# Patient Record
Sex: FEMALE | Race: WHITE | Employment: UNEMPLOYED | RURAL
[De-identification: names, ages, dates, MRNs, and addresses within clinical notes are randomized per-mention and may not be internally consistent; named-entity substitution may affect disease eponyms.]

---

## 2017-09-15 ENCOUNTER — OFFICE VISIT (OUTPATIENT)
Dept: INTERNAL MEDICINE CLINIC | Age: 52
End: 2017-09-15

## 2017-09-15 VITALS
HEART RATE: 93 BPM | HEIGHT: 66 IN | OXYGEN SATURATION: 97 % | WEIGHT: 160 LBS | SYSTOLIC BLOOD PRESSURE: 130 MMHG | TEMPERATURE: 99.4 F | RESPIRATION RATE: 16 BRPM | BODY MASS INDEX: 25.71 KG/M2 | DIASTOLIC BLOOD PRESSURE: 87 MMHG

## 2017-09-15 DIAGNOSIS — E78.2 MIXED HYPERLIPIDEMIA: ICD-10-CM

## 2017-09-15 DIAGNOSIS — E11.9 DIABETES MELLITUS TYPE 2, DIET-CONTROLLED (HCC): ICD-10-CM

## 2017-09-15 DIAGNOSIS — F41.9 ANXIETY: ICD-10-CM

## 2017-09-15 DIAGNOSIS — Z72.0 TOBACCO ABUSE: ICD-10-CM

## 2017-09-15 DIAGNOSIS — J44.9 CHRONIC OBSTRUCTIVE PULMONARY DISEASE, UNSPECIFIED COPD TYPE (HCC): Primary | ICD-10-CM

## 2017-09-15 LAB — HBA1C MFR BLD HPLC: 7.8 %

## 2017-09-15 RX ORDER — ALBUTEROL SULFATE 90 UG/1
1 AEROSOL, METERED RESPIRATORY (INHALATION)
Qty: 1 INHALER | Refills: 5 | Status: SHIPPED | OUTPATIENT
Start: 2017-09-15 | End: 2019-07-01 | Stop reason: SDUPTHER

## 2017-09-15 RX ORDER — AZITHROMYCIN 250 MG/1
250 TABLET, FILM COATED ORAL SEE ADMIN INSTRUCTIONS
Qty: 6 TAB | Refills: 0 | Status: SHIPPED | OUTPATIENT
Start: 2017-09-15 | End: 2017-10-09 | Stop reason: ALTCHOICE

## 2017-09-15 RX ORDER — BUPROPION HYDROCHLORIDE 100 MG/1
100 TABLET ORAL 3 TIMES DAILY
Qty: 90 TAB | Refills: 1 | Status: SHIPPED | OUTPATIENT
Start: 2017-09-15 | End: 2018-03-14 | Stop reason: ALTCHOICE

## 2017-09-15 RX ORDER — METHADONE HYDROCHLORIDE 10 MG/1
40 TABLET ORAL DAILY
COMMUNITY
End: 2019-04-22 | Stop reason: ALTCHOICE

## 2017-09-15 NOTE — PROGRESS NOTES
Chief Complaint   Patient presents with    Wheezing     prod cough yellow/green sputum, SOB, екатерина, wheezing, nose running, stuffy and headaches     I have reviewed the patient's medical history in detail and updated the computerized patient record. Health Maintenance reviewed. 1. Have you been to the ER, urgent care clinic since your last visit? Hospitalized since your last visit?no    2. Have you seen or consulted any other health care providers outside of the [address] [place] [address] since your last visit? Include any pap smears or colon screening.  No    Encouraged pt to discuss pt's wishes with spouse/partner/family and bring them in the next appt to follow thru with the Advanced Directive

## 2017-09-15 NOTE — MR AVS SNAPSHOT
Visit Information Date & Time Provider Department Dept. Phone Encounter #  
 9/15/2017  2:30 PM Afsaneh Taylor MD [facility] [phone] [id number] Follow-up Instructions Return in about 4 weeks (around 10/13/2017) for routine follow up. Upcoming Health Maintenance Date Due Hepatitis C Screening 1965 MICROALBUMIN Q1 3/4/1975 EYE EXAM RETINAL OR DILATED Q1 3/4/1975 Pneumococcal 19-64 Medium Risk (1 of 1 - PPSV23) 3/4/1984 PAP AKA CERVICAL CYTOLOGY 3/4/1986 FOBT Q 1 YEAR AGE 50-75 3/4/2015 BREAST CANCER SCRN MAMMOGRAM 5/6/2016 HEMOGLOBIN A1C Q6M 1/28/2017 LIPID PANEL Q1 7/28/2017 INFLUENZA AGE 9 TO ADULT 8/1/2017 FOOT EXAM Q1 9/15/2018 DTaP/Tdap/Td series (2 - Td) 7/28/2026 Allergies as of 9/15/2017  Review Complete On: 9/15/2017 By: Afsaneh Taylor MD  
  
 Severity Noted Reaction Type Reactions Codeine  06/25/2015    Hives Current Immunizations  Reviewed on 8/30/2014 Name Date Influenza Vaccine Gerline Yairten) 11/4/2014 Influenza Vaccine PF 2/24/2014 Influenza Vaccine Split 11/5/2012 Tdap 7/28/2016 Not reviewed this visit You Were Diagnosed With   
  
 Codes Comments Chronic obstructive pulmonary disease, unspecified COPD type ([facility]    -  Primary ICD-10-CM: J44.9 ICD-9-CM: 441 Diabetes mellitus type 2, diet-controlled ([facility]     ICD-10-CM: E11.9 ICD-9-CM: 250.00 Tobacco abuse     ICD-10-CM: Z72.0 ICD-9-CM: 305.1 Mixed hyperlipidemia     ICD-10-CM: E78.2 ICD-9-CM: 272.2 Anxiety     ICD-10-CM: F41.9 ICD-9-CM: 300.00 Vitals BP Pulse Temp Resp Height(growth percentile) Weight(growth percentile) (!) 159/95 (BP 1 Location: Left arm, BP Patient Position: Sitting) (!) 108 99.4 °F (37.4 °C) (Oral) 16 5' 6\" (1.676 m) 160 lb (72.6 kg) LMP SpO2 BMI OB Status Smoking Status 06/03/2014 97% 25.82 kg/m2 Hysterectomy Current Every Day Smoker Vitals History BMI and BSA Data Body Mass Index Body Surface Area  
 25.82 kg/m 2 1.84 m 2 Preferred Pharmacy Pharmacy Name Phone [facility] [address], [place] [phone] Your Updated Medication List  
  
   
This list is accurate as of: 9/15/17  3:36 PM.  Always use your most recent med list.  
  
  
  
  
 * albuterol 2.5 mg /3 mL (0.083 %) nebulizer solution Commonly known as:  PROVENTIL VENTOLIN  
3 mL by Nebulization route every four (4) hours as needed for Wheezing. * albuterol 90 mcg/actuation inhaler Commonly known as:  PROVENTIL HFA, VENTOLIN HFA, PROAIR HFA Take 1 Puff by inhalation every four (4) hours as needed for Wheezing. azithromycin 250 mg tablet Commonly known as:  Jax Candis Take 1 Tab by mouth See Admin Instructions. Take two tablets today then one tablet daily for next 4 days  
  
 beclomethasone 80 mcg/actuation Aero Commonly known as:  QVAR Take 1 Puff by inhalation two (2) times a day. buPROPion 100 mg tablet Commonly known as:  [facility] Take 1 Tab by mouth three (3) times daily. Start 1 tablet daily, usually in the evening, every few days increase as tolerated, quit smoking. cyclobenzaprine 10 mg tablet Commonly known as:  FLEXERIL Take 1 Tab by mouth three (3) times daily as needed for Muscle Spasm(s) (or headache). FLUoxetine 20 mg/5 mL (4 mg/mL) solution Commonly known as:  PROzac Take 10 mL by mouth two (2) times a day.  
  
 gabapentin 400 mg capsule Commonly known as:  NEURONTIN Take 1 Cap by mouth three (3) times daily. Indications: NEUROPATHIC PAIN  
  
 meloxicam 7.5 mg tablet Commonly known as:  MOBIC Take 1 Tab by mouth daily. Indications: OSTEOARTHRITIS  
  
 methadone 10 mg tablet Commonly known as:  DOLOPHINE Take 40 mg by mouth daily. traZODone 100 mg tablet Commonly known as:  Peyton  Take 1 Tab by mouth nightly. * Notice: This list has 2 medication(s) that are the same as other medications prescribed for you. Read the directions carefully, and ask your doctor or other care provider to review them with you. Prescriptions Printed Refills  
 beclomethasone (QVAR) 80 mcg/actuation aero 5 Sig: Take 1 Puff by inhalation two (2) times a day. Class: Print Route: Inhalation  
 albuterol (PROVENTIL HFA, VENTOLIN HFA, PROAIR HFA) 90 mcg/actuation inhaler 5 Sig: Take 1 Puff by inhalation every four (4) hours as needed for Wheezing. Class: Print Route: Inhalation  
 azithromycin (ZITHROMAX) 250 mg tablet 0 Sig: Take 1 Tab by mouth See Admin Instructions. Take two tablets today then one tablet daily for next 4 days Class: Print Route: Oral  
 buPROPion (WELLBUTRIN) 100 mg tablet 1 Sig: Take 1 Tab by mouth three (3) times daily. Start 1 tablet daily, usually in the evening, every few days increase as tolerated, quit smoking. Class: Print Route: Oral  
  
We Performed the Following AMB POC HEMOGLOBIN A1C [85750 CPT(R)] AMB SUPPLY ORDER [[id number] Custom] Comments: Anxiety tobacco abuse, cogntive behavioral therapy Follow-up Instructions Return in about 4 weeks (around 10/13/2017) for routine follow up. Patient Instructions Deciding About Using Medicines To Quit Smoking What are the medicines you can use? Your doctor may prescribe varenicline (Chantix) or bupropion (Zyban). These medicines can help you cope with cravings for tobacco. They are pills that don't contain nicotine. You also can use nicotine replacement products. These do contain nicotine. There are many types. · Gum and lozenges slowly release nicotine into your mouth. · Patches stick to your skin. They slowly release nicotine into your bloodstream. 
· An inhaler has a connelly that contains nicotine. You breathe in a puff of nicotine vapor through your mouth and throat. · Nasal spray releases a mist that contains nicotine. What are key points about this decision? · Using medicines can double your chances of quitting smoking. They can ease cravings and withdrawal symptoms. · Getting counseling along with using medicine can raise your chances of quitting even more. · If you smoke fewer than 5 cigarettes a day, you may not need medicines to help you quit smoking. · These medicines have less nicotine than cigarettes. And by itself, nicotine is not nearly as harmful as smoking. The tars, carbon monoxide, and other toxic chemicals in tobacco cause the harmful effects. · The side effects of nicotine replacement products depend on the type of product. For example, a patch can make your skin red and itchy. Medicines in pill form can make you sick to your stomach. They can also cause dry mouth and trouble sleeping. For most people, the side effects are not bad enough to make them stop using the products. · FDA warning. The U.S. Food and Drug Administration (FDA) warns that people who are taking bupropion or varenicline and who have any serious or unusual changes in mood or behavior or who feel like hurting themselves or someone else should stop taking the medicine and call a doctor right away. If you already have a mood or behavior problem, be sure to tell your doctor before you decide to use these medicines. Why might you choose to use medicines to quit smoking? · You have tried on your own to stop smoking, but you were not able to stop. · You smoke more than 5 cigarettes a day. · You want to increase your chances of quitting smoking. · You want to reduce your cravings and withdrawal symptoms. · You feel the benefits of medicine outweigh the side effects. Why might you choose not to use medicine? · You want to try quitting on your own by stopping all at once (\"cold turkey\"). · You want to cut back slowly on the number of cigarettes you smoke. · You smoke fewer than 5 cigarettes a day. · You do not like using medicine. · You feel the side effects of medicines outweigh the benefits. · You are worried about the cost of medicines. Your decision Thinking about the facts and your feelings can help you make a decision that is right for you. Be sure you understand the benefits and risks of your options, and think about what else you need to do before you make the decision. Where can you learn more? Go to http://sea-clark.info/. Enter V481 in the search box to learn more about \"Deciding About Using Medicines To Quit Smoking. \" Current as of: March 20, 2017 Content Version: 11.3 © 4935-5074 [employer]. Care instructions adapted under license by [employer] (which disclaims liability or warranty for this information). If you have questions about a medical condition or this instruction, always ask your healthcare professional. [address] any warranty or liability for your use of this information. Introducing [facility] & HEALTH SERVICES! Radha Negro introduces [employer] patient portal. Now you can access parts of your medical record, email your doctor's office, and request medication refills online. 1. In your internet browser, go to [email or web address] 2. Click on the First Time User? Click Here link in the Sign In box. You will see the New Member Sign Up page. 3. Enter your [employer] Access Code exactly as it appears below. You will not need to use this code after youve completed the sign-up process. If you do not sign up before the expiration date, you must request a new code. · [employer] Access Code: [email or web address] Expires: 12/14/2017  2:32 PM 
 
4. Enter the last four digits of your Social Security Number (xxxx) and Date of Birth (mm/dd/yyyy) as indicated and click Submit. You will be taken to the next sign-up page. 5. Create a [employer] ID. This will be your [employer] login ID and cannot be changed, so think of one that is secure and easy to remember. 6. Create a [employer] password. You can change your password at any time. 7. Enter your Password Reset Question and Answer. This can be used at a later time if you forget your password. 8. Enter your e-mail address. You will receive e-mail notification when new information is available in [address] 9. Click Sign Up. You can now view and download portions of your medical record. 10. Click the Download Summary menu link to download a portable copy of your medical information. If you have questions, please visit the Frequently Asked Questions section of the [employer] website. Remember, [employer] is NOT to be used for urgent needs. For medical emergencies, dial 911. Now available from your iPhone and Android! Please provide this summary of care documentation to your next provider. Your primary care clinician is listed as [address] If you have any questions after today's visit, please call [phone].
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
## 2017-09-15 NOTE — PATIENT INSTRUCTIONS
Deciding About Using Medicines To Quit Smoking  What are the medicines you can use? Your doctor may prescribe varenicline (Chantix) or bupropion (Zyban). These medicines can help you cope with cravings for tobacco. They are pills that don't contain nicotine. You also can use nicotine replacement products. These do contain nicotine. There are many types. · Gum and lozenges slowly release nicotine into your mouth. · Patches stick to your skin. They slowly release nicotine into your bloodstream.  · An inhaler has a connelly that contains nicotine. You breathe in a puff of nicotine vapor through your mouth and throat. · Nasal spray releases a mist that contains nicotine. What are key points about this decision? · Using medicines can double your chances of quitting smoking. They can ease cravings and withdrawal symptoms. · Getting counseling along with using medicine can raise your chances of quitting even more. · If you smoke fewer than 5 cigarettes a day, you may not need medicines to help you quit smoking. · These medicines have less nicotine than cigarettes. And by itself, nicotine is not nearly as harmful as smoking. The tars, carbon monoxide, and other toxic chemicals in tobacco cause the harmful effects. · The side effects of nicotine replacement products depend on the type of product. For example, a patch can make your skin red and itchy. Medicines in pill form can make you sick to your stomach. They can also cause dry mouth and trouble sleeping. For most people, the side effects are not bad enough to make them stop using the products. · FDA warning. The U.S. Food and Drug Administration (FDA) warns that people who are taking bupropion or varenicline and who have any serious or unusual changes in mood or behavior or who feel like hurting themselves or someone else should stop taking the medicine and call a doctor right away.  If you already have a mood or behavior problem, be sure to tell your doctor before you decide to use these medicines. Why might you choose to use medicines to quit smoking? · You have tried on your own to stop smoking, but you were not able to stop. · You smoke more than 5 cigarettes a day. · You want to increase your chances of quitting smoking. · You want to reduce your cravings and withdrawal symptoms. · You feel the benefits of medicine outweigh the side effects. Why might you choose not to use medicine? · You want to try quitting on your own by stopping all at once (\"cold turkey\"). · You want to cut back slowly on the number of cigarettes you smoke. · You smoke fewer than 5 cigarettes a day. · You do not like using medicine. · You feel the side effects of medicines outweigh the benefits. · You are worried about the cost of medicines. Your decision  Thinking about the facts and your feelings can help you make a decision that is right for you. Be sure you understand the benefits and risks of your options, and think about what else you need to do before you make the decision. Where can you learn more? Go to http://sea-clark.info/. Enter X069 in the search box to learn more about \"Deciding About Using Medicines To Quit Smoking. \"  Current as of: March 20, 2017  Content Version: 11.3  © 0579-6100 [employer]. Care instructions adapted under license by [employer] (which disclaims liability or warranty for this information). If you have questions about a medical condition or this instruction, always ask your healthcare professional. [place] [address] any warranty or liability for your use of this information.

## 2017-10-09 ENCOUNTER — TELEPHONE (OUTPATIENT)
Dept: INTERNAL MEDICINE CLINIC | Age: 52
End: 2017-10-09

## 2017-10-09 ENCOUNTER — OFFICE VISIT (OUTPATIENT)
Dept: INTERNAL MEDICINE CLINIC | Age: 52
End: 2017-10-09

## 2017-10-09 VITALS
SYSTOLIC BLOOD PRESSURE: 151 MMHG | RESPIRATION RATE: 20 BRPM | WEIGHT: 166 LBS | DIASTOLIC BLOOD PRESSURE: 92 MMHG | TEMPERATURE: 98.4 F | HEART RATE: 105 BPM | OXYGEN SATURATION: 98 % | HEIGHT: 66 IN | BODY MASS INDEX: 26.68 KG/M2

## 2017-10-09 DIAGNOSIS — G89.29 CHRONIC RIGHT-SIDED LOW BACK PAIN WITH SCIATICA, SCIATICA LATERALITY UNSPECIFIED: Primary | ICD-10-CM

## 2017-10-09 DIAGNOSIS — M17.12 ARTHRITIS OF KNEE, LEFT: ICD-10-CM

## 2017-10-09 DIAGNOSIS — M54.40 CHRONIC RIGHT-SIDED LOW BACK PAIN WITH SCIATICA, SCIATICA LATERALITY UNSPECIFIED: Primary | ICD-10-CM

## 2017-10-09 DIAGNOSIS — F11.93 OPIOID WITHDRAWAL (HCC): ICD-10-CM

## 2017-10-09 RX ORDER — CLONIDINE HYDROCHLORIDE 0.1 MG/1
0.1 TABLET ORAL 2 TIMES DAILY
Qty: 60 TAB | Refills: 0 | Status: SHIPPED | OUTPATIENT
Start: 2017-10-09 | End: 2018-03-14 | Stop reason: ALTCHOICE

## 2017-10-09 RX ORDER — HYDROCODONE BITARTRATE AND ACETAMINOPHEN 5; 325 MG/1; MG/1
1 TABLET ORAL
Qty: 28 TAB | Refills: 0 | Status: SHIPPED | OUTPATIENT
Start: 2017-10-09 | End: 2018-03-14 | Stop reason: ALTCHOICE
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
## 2017-10-09 NOTE — MR AVS SNAPSHOT
Visit Information Date & Time Provider Department Dept. Phone Encounter #  
 10/9/2017  1:15 PM Za Rankin MD [facility] [phone] Follow-up Instructions Return in about 6 weeks (around 11/20/2017) for routine follow up. Upcoming Health Maintenance Date Due Hepatitis C Screening 1965 MICROALBUMIN Q1 3/4/1975 EYE EXAM RETINAL OR DILATED Q1 3/4/1975 Pneumococcal 19-64 Medium Risk (1 of 1 - PPSV23) 3/4/1984 PAP AKA CERVICAL CYTOLOGY 3/4/1986 FOBT Q 1 YEAR AGE 50-75 3/4/2015 BREAST CANCER SCRN MAMMOGRAM 5/6/2016 LIPID PANEL Q1 7/28/2017 INFLUENZA AGE 9 TO ADULT 8/1/2017 HEMOGLOBIN A1C Q6M 3/15/2018 FOOT EXAM Q1 9/15/2018 DTaP/Tdap/Td series (2 - Td) 7/28/2026 Allergies as of 10/9/2017  Review Complete On: 10/9/2017 By: Za Rankin MD  
  
 Severity Noted Reaction Type Reactions Codeine  06/25/2015    Hives Current Immunizations  Reviewed on 8/30/2014 Name Date Influenza Vaccine Helen Aida) 11/4/2014 Influenza Vaccine PF 2/24/2014 Influenza Vaccine Split 11/5/2012 Tdap 7/28/2016 Not reviewed this visit You Were Diagnosed With   
  
 Codes Comments Chronic right-sided low back pain with sciatica, sciatica laterality unspecified    -  Primary ICD-10-CM: M54.40, G89.29 ICD-9-CM: 724.2, 724.3, 338.29 Arthritis of knee, left     ICD-10-CM: M17.12 
ICD-9-CM: 716.96 [facility]     ICD-10-CM: F11.23 
ICD-9-CM: 292.0, 304.00 Vitals BP Pulse Temp Resp Height(growth percentile) Weight(growth percentile) (!) 151/92 (BP 1 Location: Right arm, BP Patient Position: At rest) (!) 105 98.4 °F (36.9 °C) (Oral) 20 5' 6\" (1.676 m) 166 lb (75.3 kg) LMP SpO2 BMI OB Status Smoking Status 06/03/2014 98% 26.79 kg/m2 Hysterectomy Current Every Day Smoker BMI and BSA Data Body Mass Index Body Surface Area  
 26.79 kg/m 2 1.87 m 2 Preferred Pharmacy Pharmacy Name Phone [facility] [address], [address] [phone] Your Updated Medication List  
  
   
This list is accurate as of: 10/9/17  1:45 PM.  Always use your most recent med list.  
  
  
  
  
 * albuterol 2.5 mg /3 mL (0.083 %) nebulizer solution Commonly known as:  PROVENTIL VENTOLIN  
3 mL by Nebulization route every four (4) hours as needed for Wheezing. * albuterol 90 mcg/actuation inhaler Commonly known as:  PROVENTIL HFA, VENTOLIN HFA, PROAIR HFA Take 1 Puff by inhalation every four (4) hours as needed for Wheezing. beclomethasone 80 mcg/actuation [employer] Commonly known as:  QVAR Take 1 Puff by inhalation two (2) times a day. buPROPion 100 mg tablet Commonly known as:  [facility] Take 1 Tab by mouth three (3) times daily. Start 1 tablet daily, usually in the evening, every few days increase as tolerated, quit smoking. cloNIDine HCl 0.1 mg tablet Commonly known as:  CATAPRES Take 1 Tab by mouth two (2) times a day. cyclobenzaprine 10 mg tablet Commonly known as:  FLEXERIL Take 1 Tab by mouth three (3) times daily as needed for Muscle Spasm(s) (or headache). FLUoxetine 20 mg/5 mL (4 mg/mL) solution Commonly known as:  PROzac Take 10 mL by mouth two (2) times a day.  
  
 gabapentin 400 mg capsule Commonly known as:  NEURONTIN Take 1 Cap by mouth three (3) times daily. Indications: NEUROPATHIC PAIN  
  
 HYDROcodone-acetaminophen 5-325 mg per tablet Commonly known as:  Lisa Mura Take 1 Tab by mouth every twelve (12) hours as needed for Pain. Max Daily Amount: 2 Tabs. meloxicam 7.5 mg tablet Commonly known as:  MOBIC Take 1 Tab by mouth daily. Indications: OSTEOARTHRITIS  
  
 methadone 10 mg tablet Commonly known as:  DOLOPHINE Take 40 mg by mouth daily. Indications: Rx stolen 10/6/2017  
  
 traZODone 100 mg tablet Commonly known as:  Berniece Staggers Take 1 Tab by mouth nightly. * Notice: This list has 2 medication(s) that are the same as other medications prescribed for you. Read the directions carefully, and ask your doctor or other care provider to review them with you. Prescriptions Printed Refills HYDROcodone-acetaminophen (NORCO) 5-325 mg per tablet 0 Sig: Take 1 Tab by mouth every twelve (12) hours as needed for Pain. Max Daily Amount: 2 Tabs. Class: Print Route: Oral  
  
Prescriptions Sent to Pharmacy Refills  
 cloNIDine HCl (CATAPRES) 0.1 mg tablet 0 Sig: Take 1 Tab by mouth two (2) times a day. Class: Normal  
 Pharmacy: [address] [facility], [address] [facility] #: [phone] Route: Oral  
  
Follow-up Instructions Return in about 6 weeks (around 11/20/2017) for routine follow up. Introducing [facility] SERVICES! Emil Barrett introduces [employer] View App patient portal. Now you can access parts of your medical record, email your doctor's office, and request medication refills online. 1. In your internet browser, go to [email or web address] 2. Click on the First Time User? Click Here link in the Sign In box. You will see the New Member Sign Up page. 3. Enter your Virtual View App Access Code exactly as it appears below. You will not need to use this code after youve completed the sign-up process. If you do not sign up before the expiration date, you must request a new code. · Virtual View App Access Code: [email or web address] Expires: 12/14/2017  2:32 PM 
 
4. Enter the last four digits of your Social Security Number (xxxx) and Date of Birth (mm/dd/yyyy) as indicated and click Submit. You will be taken to the next sign-up page. 5. Create a [employer] ID. This will be your Virtual View App login ID and cannot be changed, so think of one that is secure and easy to remember. 6. Create a [employer] View App password. You can change your password at any time. 7. Enter your Password Reset Question and Answer. This can be used at a later time if you forget your password. 8. Enter your e-mail address. You will receive e-mail notification when new information is available in [address] 9. Click Sign Up. You can now view and download portions of your medical record. 10. Click the Download Summary menu link to download a portable copy of your medical information. If you have questions, please visit the Frequently Asked Questions section of the [employer] website. Remember, [employer] is NOT to be used for urgent needs. For medical emergencies, dial 911. Now available from your iPhone and Android! Please provide this summary of care documentation to your next provider. Your primary care clinician is listed as Zoya Jacobsen. If you have any questions after today's visit, please call [phone].

## 2018-01-15 ENCOUNTER — CLINICAL SUPPORT (OUTPATIENT)
Dept: INTERNAL MEDICINE CLINIC | Age: 53
End: 2018-01-15

## 2018-01-15 VITALS — TEMPERATURE: 98.6 F

## 2018-01-15 DIAGNOSIS — Z23 ENCOUNTER FOR IMMUNIZATION: Primary | ICD-10-CM

## 2018-01-15 NOTE — PROGRESS NOTES
Flu shot given  Roxana Canas LPN  4/69/5192  9:00 PM    Ciro Jeffrey is a 46 y.o. female who presents for routine immunizations - She denies any symptoms , reactions or allergies that would exclude them from being immunized today - Risks and adverse reactions were discussed and the VIS was given to them. All questions were addressed - 0.5 ml Influenza Vaccine given IM to left deltoid per verbal order of Ab Akbar MD  -She was observed for 10 min post injection.  There were no reactions observed - Roxana Canas LPN

## 2018-03-14 ENCOUNTER — OFFICE VISIT (OUTPATIENT)
Dept: INTERNAL MEDICINE CLINIC | Age: 53
End: 2018-03-14

## 2018-03-14 VITALS
TEMPERATURE: 99.3 F | HEART RATE: 97 BPM | DIASTOLIC BLOOD PRESSURE: 72 MMHG | BODY MASS INDEX: 27 KG/M2 | OXYGEN SATURATION: 100 % | WEIGHT: 168 LBS | HEIGHT: 66 IN | RESPIRATION RATE: 24 BRPM | SYSTOLIC BLOOD PRESSURE: 142 MMHG

## 2018-03-14 DIAGNOSIS — Z23 ENCOUNTER FOR IMMUNIZATION: ICD-10-CM

## 2018-03-14 DIAGNOSIS — Z12.31 SCREENING MAMMOGRAM, ENCOUNTER FOR: ICD-10-CM

## 2018-03-14 DIAGNOSIS — N95.9 MENOPAUSAL AND PERIMENOPAUSAL DISORDER: ICD-10-CM

## 2018-03-14 DIAGNOSIS — M54.41 CHRONIC RIGHT-SIDED LOW BACK PAIN WITH RIGHT-SIDED SCIATICA: ICD-10-CM

## 2018-03-14 DIAGNOSIS — Z13.220 SCREENING CHOLESTEROL LEVEL: ICD-10-CM

## 2018-03-14 DIAGNOSIS — G89.29 CHRONIC RIGHT-SIDED LOW BACK PAIN WITH RIGHT-SIDED SCIATICA: ICD-10-CM

## 2018-03-14 DIAGNOSIS — E11.40 TYPE 2 DIABETES MELLITUS WITH DIABETIC NEUROPATHY, WITHOUT LONG-TERM CURRENT USE OF INSULIN (HCC): Primary | ICD-10-CM

## 2018-03-14 RX ORDER — ESTRADIOL 0.5 MG/1
0.5 TABLET ORAL DAILY
Qty: 90 TAB | Refills: 1 | Status: SHIPPED | OUTPATIENT
Start: 2018-03-14 | End: 2019-01-09 | Stop reason: SDUPTHER

## 2018-03-14 RX ORDER — METFORMIN HYDROCHLORIDE 500 MG/1
500 TABLET ORAL
Qty: 90 TAB | Refills: 1 | Status: SHIPPED | OUTPATIENT
Start: 2018-03-14 | End: 2018-03-14 | Stop reason: SDUPTHER

## 2018-03-14 RX ORDER — METFORMIN HYDROCHLORIDE 500 MG/1
500 TABLET ORAL
Qty: 90 TAB | Refills: 1 | Status: SHIPPED | OUTPATIENT
Start: 2018-03-14 | End: 2018-08-24 | Stop reason: SDUPTHER

## 2018-03-14 RX ORDER — ESTRADIOL 0.5 MG/1
0.5 TABLET ORAL DAILY
Qty: 90 TAB | Refills: 1 | Status: SHIPPED | OUTPATIENT
Start: 2018-03-14 | End: 2018-03-14 | Stop reason: SDUPTHER

## 2018-03-14 NOTE — PATIENT INSTRUCTIONS
Hormone Therapy (HT): Care Instructions  Your Care Instructions    Hormone therapy (HT) is medicine to treat symptoms of menopause, such as hot flashes, vaginal dryness, and sleep problems. It replaces the hormones that drop at menopause. Most women get relief from these symptoms within weeks of starting HT. HT contains two female hormones, estrogen and progestin. HT may come in the form of a pill, patch, gel, spray, or vaginal ring. A vaginal cream or a vaginal ring that has a much lower dose of estrogen may be used to relieve vaginal dryness only. HT has some risks. Most doctors recommend that women only take HT for as short a time as possible. This is to reduce the chances of heart disease, breast cancer, blood clots, and stroke that may be connected to HT. Be sure to have regular checkups with your doctor when taking HT. Talk with your doctor about whether HT is right for you. If you decide that the benefits of HT outweigh the risks, ask your doctor to prescribe the lowest effective dose for as short a time as possible. Follow-up care is a key part of your treatment and safety. Be sure to make and go to all appointments, and call your doctor if you are having problems. It's also a good idea to know your test results and keep a list of the medicines you take. Why might you take HT?  · HT reduces symptoms of menopause. These include hot flashes, mood swings, and sleep problems. · The estrogen in HT helps to prevent thinning bones. And it may lower the chance of colon cancer. · HT helps keep the lining of the vagina moist and thick. This can reduce irritation. · HT helps protect against dental problems, such as tooth loss and gum disease. What are the risks of taking HT? · Some women who take HT may have vaginal bleeding, bloating, nausea, sore breasts, mood swings, and headaches. Talk to your doctor about changing the type of HT you take or lowering the dose.  This may help to end these side effects. · Taking HT may slightly increase your risk for heart disease, breast cancer, ovarian cancer, blood clots, and stroke. · You should not take HT if you:  ¨ Could be pregnant. ¨ Have a personal history of breast cancer, endometrial cancer, pulmonary embolism, deep vein thrombosis, heart attack, or stroke. ¨ Have vaginal bleeding from an unknown cause. ¨ Have active liver disease. What can you do to reduce the symptoms of menopause? · Eat healthy foods and get regular exercise. This also will help to maintain strong bones and a healthy heart. · Do not smoke. If you smoke, you can reduce hot flashes and long-term health risks by stopping. If you need help quitting, talk to your doctor about stop-smoking programs and medicines. These can increase your chances of quitting for good. · Practice daily breathing exercises (meditation) to reduce hot flashes and mood swings. · Limit the amount of alcohol you drink. This can reduce symptoms of menopause and long-term health risks. · Keep your home and office cool. · Use a vaginal lubricant, such as Astroglide, Wet Gel Lubricant, or K-Y Jelly. · Do pelvic floor (Kegel) exercises, which tighten and strengthen pelvic muscles. To do Kegel exercises:  ¨ Squeeze the same muscles you would use to stop your urine. Your belly and thighs should not move. ¨ Hold the squeeze for 3 seconds, then relax for 3 seconds. ¨ Start with 3 seconds. Then add 1 second each week until you are able to squeeze for 10 seconds. ¨ Repeat the exercise 10 to 15 times a session. Do three or more sessions a day. Where can you learn more? Go to http://sea-clark.info/. Enter [id number] in the search box to learn more about \"Hormone Therapy (HT): Care Instructions. \"  Current as of: October 13, 2016  Content Version: 11.4  © 9178-4653 [employer], [employer].  Care instructions adapted under license by [employer] (which disclaims liability or warranty for this information). If you have questions about a medical condition or this instruction, always ask your healthcare professional. [address] any warranty or liability for your use of this information. Stopping Smoking: Care Instructions  Your Care Instructions    Cigarette smokers crave the nicotine in cigarettes. Giving it up is much harder than simply changing a habit. Your body has to stop craving the nicotine. It is hard to quit, but you can do it. There are many tools that people use to quit smoking. You may find that combining tools works best for you. There are several steps to quitting. First you get ready to quit. Then you get support to help you. After that, you learn new skills and behaviors to become a nonsmoker. For many people, a necessary step is getting and using medicine. Your doctor will help you set up the plan that best meets your needs. You may want to attend a smoking cessation program to help you quit smoking. When you choose a program, look for one that has proven success. Ask your doctor for ideas. You will greatly increase your chances of success if you take medicine as well as get counseling or join a cessation program.  Some of the changes you feel when you first quit tobacco are uncomfortable. Your body will miss the nicotine at first, and you may feel short-tempered and grumpy. You may have trouble sleeping or concentrating. Medicine can help you deal with these symptoms. You may struggle with changing your smoking habits and rituals. The last step is the tricky one: Be prepared for the smoking urge to continue for a time. This is a lot to deal with, but keep at it. You will feel better. Follow-up care is a key part of your treatment and safety. Be sure to make and go to all appointments, and call your doctor if you are having problems. It's also a good idea to know your test results and keep a list of the medicines you take.   How can you care for yourself at home?  · Ask your family, friends, and coworkers for support. You have a better chance of quitting if you have help and support. · Join a support group, such as Nicotine Anonymous, for people who are trying to quit smoking. · Consider signing up for a smoking cessation program, such as the American Lung Association's Freedom from Smoking program.  · Set a quit date. Pick your date carefully so that it is not right in the middle of a big deadline or stressful time. Once you quit, do not even take a puff. Get rid of all ashtrays and lighters after your last cigarette. Clean your house and your clothes so that they do not smell of smoke. · Learn how to be a nonsmoker. Think about ways you can avoid those things that make you reach for a cigarette. ¨ Avoid situations that put you at greatest risk for smoking. For some people, it is hard to have a drink with friends without smoking. For others, they might skip a coffee break with coworkers who smoke. ¨ Change your daily routine. Take a different route to work or eat a meal in a different place. · Cut down on stress. Calm yourself or release tension by doing an activity you enjoy, such as reading a book, taking a hot bath, or gardening. · Talk to your doctor or pharmacist about nicotine replacement therapy, which replaces the nicotine in your body. You still get nicotine but you do not use tobacco. Nicotine replacement products help you slowly reduce the amount of nicotine you need. These products come in several forms, many of them available over-the-counter:  ¨ Nicotine patches  ¨ Nicotine gum and lozenges  ¨ Nicotine inhaler  · Ask your doctor about bupropion (Wellbutrin) or varenicline (Chantix), which are prescription medicines. They do not contain nicotine. They help you by reducing withdrawal symptoms, such as stress and anxiety. · Some people find hypnosis, acupuncture, and massage helpful for ending the smoking habit.   · Eat a healthy diet and get regular exercise. Having healthy habits will help your body move past its craving for nicotine. · Be prepared to keep trying. Most people are not successful the first few times they try to quit. Do not get mad at yourself if you smoke again. Make a list of things you learned and think about when you want to try again, such as next week, next month, or next year. Where can you learn more? Go to http://sea-clark.info/. Enter N434 in the search box to learn more about \"Stopping Smoking: Care Instructions. \"  Current as of: March 20, 2017  Content Version: 11.4  © 6308-3739 [employer]. Care instructions adapted under license by [employer] (which disclaims liability or warranty for this information). If you have questions about a medical condition or this instruction, always ask your healthcare professional. [place] [address] any warranty or liability for your use of this information. Pneumococcal Conjugate Vaccine (PCV13): What You Need to Know  Why get vaccinated? Vaccination can protect both children and adults from pneumococcal disease. Pneumococcal disease is caused by bacteria that can spread from person to person through close contact. It can cause ear infections, and it can also lead to more serious infections of the:  · Lungs (pneumonia). · Blood (bacteremia). · Covering of the brain and spinal cord (meningitis). Pneumococcal pneumonia is most common among adults. Pneumococcal meningitis can cause deafness and brain damage, and it kills about 1 child in 10 who get it. Anyone can get pneumococcal disease, but children under 3years of age and adults 72 years and older, people with certain medical conditions, and cigarette smokers are at the highest risk.   Before there was a vaccine, the [facility] saw the following in children under 5 each year from pneumococcal disease:  · More than 700 cases of meningitis  · About 13,000 blood infections  · About 5 million ear infections  · About 200 deaths  Since the vaccine became available, severe pneumococcal disease in these children has fallen by 88%. About 18,000 older adults die of pneumococcal disease each year in the [place]. Treatment of pneumococcal infections with penicillin and other drugs is not as effective as it used to be, because some strains of the disease have become resistant to these drugs. This makes prevention of the disease through vaccination even more important. PCV13 vaccine  Pneumococcal conjugate vaccine (called PCV13) protects against 13 types of pneumococcal bacteria. PCV13 is routinely given to children at 2, 4, 6, and 1515 months of age. It is also recommended for children and adults 3to 59years of age with certain health conditions, and for all adults 72years of age and older. Your doctor can give you details. Some people should not get this vaccine  Anyone who has ever had a life-threatening allergic reaction to a dose of this vaccine, to an earlier pneumococcal vaccine called PCV7, or to any vaccine containing diphtheria toxoid (for example, DTaP), should not get PCV13. Anyone with a severe allergy to any component of PCV13 should not get the vaccine. Tell your doctor if the person being vaccinated has any severe allergies. If the person scheduled for vaccination is not feeling well, your healthcare provider might decide to reschedule the shot on another day. Risks of a vaccine reaction  With any medicine, including vaccines, there is a chance of reactions. These are usually mild and go away on their own, but serious reactions are also possible. Problems reported following PCV13 varied by age and dose in the series. The most common problems reported among children were:  · About half became drowsy after the shot, had a temporary loss of appetite, or had redness or tenderness where the shot was given.   · About 1 out of 3 had swelling where the shot was given. · About 1 out of 3 had a mild fever, and about 1 in 20 had a fever over 102.2°F.  · Up to about 8 out of 10 became fussy or irritable. Adults have reported pain, redness, and swelling where the shot was given; also mild fever, fatigue, headache, chills, or muscle pain. Brett Fetch children who get PCV13 along with inactivated flu vaccine at the same time may be at increased risk for seizures caused by fever. Ask your doctor for more information. Problems that could happen after any vaccine:  · People sometimes faint after a medical procedure, including vaccination. Sitting or lying down for about 15 minutes can help prevent fainting and the injuries caused by a fall. Tell your doctor if you feel dizzy or have vision changes or ringing in the ears. · Some older children and adults get severe pain in the shoulder and have difficulty moving the arm where a shot was given. This happens very rarely. · Any medication can cause a severe allergic reaction. Such reactions from a vaccine are very rare, estimated at about 1 in a million doses, and would happen within a few minutes to a few hours after the vaccination. As with any medicine, there is a very small chance of a vaccine causing a serious injury or death. The safety of vaccines is always being monitored. For more information, visit: www.cdc.gov/vaccinesafety. What if there is a serious reaction? What should I look for? · Look for anything that concerns you, such as signs of a severe allergic reaction, very high fever, or unusual behavior. Signs of a severe allergic reaction can include hives, swelling of the face and throat, difficulty breathing, a fast heartbeat, dizziness, and weakness, usually within a few minutes to a few hours after the vaccination. What should I do? · If you think it is a severe allergic reaction or other emergency that can't wait, call 911 or get the person to the nearest hospital. Otherwise, call your doctor.   · Reactions should be reported to the Vaccine Adverse Event Reporting System (VAERS). Your doctor should file this report, or you can do it yourself through the VAERS website at www.vaers. hhs.gov, or by calling 0-927.809.1255. VAERS does not give medical advice. The National Vaccine Injury Compensation Program  The National Vaccine Injury Compensation Program (VICP) is a federal program that was created to compensate people who may have been injured by certain vaccines. Persons who believe they may have been injured by a vaccine can learn about the program and about filing a claim by calling 4-988.166.9626 or visiting the [employer] website at www.[facility].gov/vaccinecompensation. There is a time limit to file a claim for compensation. How can I learn more? · Ask your healthcare provider. He or she can give you the vaccine package insert or suggest other sources of information. · Call your local or state health department. · Contact the Centers for Disease Control and Prevention (CDC):  ¨ Call 4-900.913.6641 (1-800-CDC-INFO) or  ¨ Visit CDC's website at www.cdc.gov/vaccines  Vaccine Information Statement  PCV13 Vaccine  11/5/2015  [address] 502OW-00  Department of Health and Human Services  Centers for Disease Control and Prevention  Many Vaccine Information Statements are available in French and other languages. See www.immunize.org/vis. Muchas hojas de información sobre vacunas están disponibles en español y en otros idiomas. Visite www.immunize.org/vis. Care instructions adapted under license by [employer] (which disclaims liability or warranty for this information). If you have questions about a medical condition or this instruction, always ask your healthcare professional. [place] [address] any warranty or liability for your use of this information. Pneumococcal Conjugate Vaccine (PCV13): What You Need to Know  Why get vaccinated?   Vaccination can protect both children and adults from pneumococcal disease. Pneumococcal disease is caused by bacteria that can spread from person to person through close contact. It can cause ear infections, and it can also lead to more serious infections of the:  · Lungs (pneumonia). · Blood (bacteremia). · Covering of the brain and spinal cord (meningitis). Pneumococcal pneumonia is most common among adults. Pneumococcal meningitis can cause deafness and brain damage, and it kills about 1 child in 10 who get it. Anyone can get pneumococcal disease, but children under 3years of age and adults 72 years and older, people with certain medical conditions, and cigarette smokers are at the highest risk. Before there was a vaccine, the [facility] saw the following in children under 5 each year from pneumococcal disease:  · More than 700 cases of meningitis  · About 13,000 blood infections  · About 5 million ear infections  · About 200 deaths  Since the vaccine became available, severe pneumococcal disease in these children has fallen by 88%. About 18,000 older adults die of pneumococcal disease each year in the [place]. Treatment of pneumococcal infections with penicillin and other drugs is not as effective as it used to be, because some strains of the disease have become resistant to these drugs. This makes prevention of the disease through vaccination even more important. PCV13 vaccine  Pneumococcal conjugate vaccine (called PCV13) protects against 13 types of pneumococcal bacteria. PCV13 is routinely given to children at 2, 4, 6, and 1515 months of age. It is also recommended for children and adults 3to 59years of age with certain health conditions, and for all adults 72years of age and older. Your doctor can give you details.   Some people should not get this vaccine  Anyone who has ever had a life-threatening allergic reaction to a dose of this vaccine, to an earlier pneumococcal vaccine called PCV7, or to any vaccine containing diphtheria toxoid (for example, DTaP), should not get PCV13. Anyone with a severe allergy to any component of PCV13 should not get the vaccine. Tell your doctor if the person being vaccinated has any severe allergies. If the person scheduled for vaccination is not feeling well, your healthcare provider might decide to reschedule the shot on another day. Risks of a vaccine reaction  With any medicine, including vaccines, there is a chance of reactions. These are usually mild and go away on their own, but serious reactions are also possible. Problems reported following PCV13 varied by age and dose in the series. The most common problems reported among children were:  · About half became drowsy after the shot, had a temporary loss of appetite, or had redness or tenderness where the shot was given. · About 1 out of 3 had swelling where the shot was given. · About 1 out of 3 had a mild fever, and about 1 in 20 had a fever over 102.2°F.  · Up to about 8 out of 10 became fussy or irritable. Adults have reported pain, redness, and swelling where the shot was given; also mild fever, fatigue, headache, chills, or muscle pain. Lissa Marus children who get PCV13 along with inactivated flu vaccine at the same time may be at increased risk for seizures caused by fever. Ask your doctor for more information. Problems that could happen after any vaccine:  · People sometimes faint after a medical procedure, including vaccination. Sitting or lying down for about 15 minutes can help prevent fainting and the injuries caused by a fall. Tell your doctor if you feel dizzy or have vision changes or ringing in the ears. · Some older children and adults get severe pain in the shoulder and have difficulty moving the arm where a shot was given. This happens very rarely. · Any medication can cause a severe allergic reaction.  Such reactions from a vaccine are very rare, estimated at about 1 in a million doses, and would happen within a few minutes to a few hours after the vaccination. As with any medicine, there is a very small chance of a vaccine causing a serious injury or death. The safety of vaccines is always being monitored. For more information, visit: www.cdc.gov/vaccinesafety. What if there is a serious reaction? What should I look for? · Look for anything that concerns you, such as signs of a severe allergic reaction, very high fever, or unusual behavior. Signs of a severe allergic reaction can include hives, swelling of the face and throat, difficulty breathing, a fast heartbeat, dizziness, and weakness, usually within a few minutes to a few hours after the vaccination. What should I do? · If you think it is a severe allergic reaction or other emergency that can't wait, call 911 or get the person to the nearest hospital. Otherwise, call your doctor. · Reactions should be reported to the Vaccine Adverse Event Reporting System (VAERS). Your doctor should file this report, or you can do it yourself through the VAERS website at www.vaers. [facility].gov, or by calling 1-575.883.2806. VAERS does not give medical advice. The National Vaccine Injury Compensation Program  The National Vaccine Injury Compensation Program (VICP) is a federal program that was created to compensate people who may have been injured by certain vaccines. Persons who believe they may have been injured by a vaccine can learn about the program and about filing a claim by calling 4-130.119.1733 or visiting the [employer] website at www.[facility].gov/vaccinecompensation. There is a time limit to file a claim for compensation. How can I learn more? · Ask your healthcare provider. He or she can give you the vaccine package insert or suggest other sources of information. · Call your local or state health department.   · Contact the Centers for Disease Control and Prevention (CDC):  ¨ Call 0-333.258.3369 (1-800-CDC-INFO) or  ¨ Visit CDC's website at www.cdc.gov/vaccines  Vaccine Information Statement  PCV13 Vaccine  11/5/2015  [address] 518SS-[id number]  Department of Health and Human Services  Centers for Disease Control and Prevention  Many Vaccine Information Statements are available in [place] citizen and other languages. See www.immunize.org/vis. Muchas hojas de información sobre vacunas están disponibles en español y en otros idiomas. Visite www.immunize.org/vis. Care instructions adapted under license by [employer] (which disclaims liability or warranty for this information). If you have questions about a medical condition or this instruction, always ask your healthcare professional. [place] [address] any warranty or liability for your use of this information.

## 2018-03-14 NOTE — PROGRESS NOTES
Elevated blood sugar readings - levels in the 200s - anxiety  Amrita Aguirre LPN  1/53/5775  3:81 PM
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
## 2018-03-15 ENCOUNTER — CLINICAL SUPPORT (OUTPATIENT)
Dept: INTERNAL MEDICINE CLINIC | Age: 53
End: 2018-03-15

## 2018-03-15 ENCOUNTER — TELEPHONE (OUTPATIENT)
Dept: INTERNAL MEDICINE CLINIC | Age: 53
End: 2018-03-15

## 2018-03-15 VITALS
HEART RATE: 76 BPM | SYSTOLIC BLOOD PRESSURE: 135 MMHG | DIASTOLIC BLOOD PRESSURE: 84 MMHG | RESPIRATION RATE: 20 BRPM | OXYGEN SATURATION: 100 %

## 2018-03-15 DIAGNOSIS — R74.8 ELEVATED LIVER ENZYMES: Primary | ICD-10-CM

## 2018-03-15 DIAGNOSIS — E78.2 MIXED HYPERLIPIDEMIA: ICD-10-CM

## 2018-03-15 DIAGNOSIS — E11.40 TYPE 2 DIABETES MELLITUS WITH DIABETIC NEUROPATHY, WITHOUT LONG-TERM CURRENT USE OF INSULIN (HCC): Primary | ICD-10-CM

## 2018-03-15 LAB
ALBUMIN SERPL-MCNC: 4.5 G/DL (ref 3.5–5.5)
ALBUMIN/GLOB SERPL: 1.5 {RATIO} (ref 1.2–2.2)
ALP SERPL-CCNC: 100 IU/L (ref 39–117)
ALT SERPL-CCNC: 71 IU/L (ref 0–32)
AST SERPL-CCNC: 54 IU/L (ref 0–40)
BILIRUB SERPL-MCNC: 0.2 MG/DL (ref 0–1.2)
BUN SERPL-MCNC: 14 MG/DL (ref 6–24)
BUN/CREAT SERPL: 19 (ref 9–23)
CALCIUM SERPL-MCNC: 9.3 MG/DL (ref 8.7–10.2)
CHLORIDE SERPL-SCNC: 98 MMOL/L (ref 96–106)
CHOLEST SERPL-MCNC: 222 MG/DL (ref 100–199)
CO2 SERPL-SCNC: 25 MMOL/L (ref 18–29)
CREAT SERPL-MCNC: 0.73 MG/DL (ref 0.57–1)
ERYTHROCYTE [DISTWIDTH] IN BLOOD BY AUTOMATED COUNT: 14.4 % (ref 12.3–15.4)
EST. AVERAGE GLUCOSE BLD GHB EST-MCNC: 174 MG/DL
GFR SERPLBLD CREATININE-BSD FMLA CKD-EPI: 109 ML/MIN/1.73
GFR SERPLBLD CREATININE-BSD FMLA CKD-EPI: 94 ML/MIN/1.73
GLOBULIN SER CALC-MCNC: 3.1 G/DL (ref 1.5–4.5)
GLUCOSE SERPL-MCNC: 177 MG/DL (ref 65–99)
HBA1C MFR BLD: 7.7 % (ref 4.8–5.6)
HCT VFR BLD AUTO: 42.4 % (ref 34–46.6)
HDLC SERPL-MCNC: 40 MG/DL
HGB BLD-MCNC: 14.6 G/DL (ref 11.1–15.9)
INTERPRETATION, 910389: NORMAL
LDLC SERPL CALC-MCNC: 110 MG/DL (ref 0–99)
Lab: NORMAL
MCH RBC QN AUTO: 31.8 PG (ref 26.6–33)
MCHC RBC AUTO-ENTMCNC: 34.4 G/DL (ref 31.5–35.7)
MCV RBC AUTO: 92 FL (ref 79–97)
PLATELET # BLD AUTO: 100 X10E3/UL (ref 150–379)
POTASSIUM SERPL-SCNC: 4.4 MMOL/L (ref 3.5–5.2)
PROT SERPL-MCNC: 7.6 G/DL (ref 6–8.5)
RBC # BLD AUTO: 4.59 X10E6/UL (ref 3.77–5.28)
SODIUM SERPL-SCNC: 142 MMOL/L (ref 134–144)
TRIGL SERPL-MCNC: 361 MG/DL (ref 0–149)
VLDLC SERPL CALC-MCNC: 72 MG/DL (ref 5–40)
WBC # BLD AUTO: 8.1 X10E3/UL (ref 3.4–10.8)

## 2018-03-15 NOTE — PROGRESS NOTES
Patient in for labs only - repeat labs per Dr Gary Gonzalez - labs drawn and sent out to [address] [place] [address]  2/32/4812  0:31 PM

## 2018-03-15 NOTE — PROGRESS NOTES
Subjective:     Bobby Javed is a 48 y.o. female seen for follow-up of diabetes. She has had hypoglycemic attacks. .no  Blood sugar control has been running high  She has diabetes. Bobyb Javed has the additional concern of lots of anxiety, would like to get back on anxiety medications although she is on methadone which she takes for back pain. States that she is not addicted, takes the methadone because it helps her pain. Blames it for increased weight. Increased weight in turn makes her back a little worse and makes her diabetes worse. Because of her sugars being elevated she restarted leftover metformin. When she lost weight her diabetes got better and she got off it. Wants to restart. Lately she has been having a lot of hot flashes. These in her anxiety make her miserable. Her mood has been very labile. Has a lot of stressors at home. Not suicidal.      Diabetic Review of Systems: no polyuria or polydipsia, no chest pain, dyspnea or TIA's, no numbness, tingling or pain in extremities. Allergies   Allergen Reactions    Codeine Hives       Diet and Lifestyle: does not rigorously follow a diabetic diet, smoker Half a pack a day. Has Wellbutrin but wants to wait until she is ready to quit to use it.     Patient Active Problem List    Diagnosis Date Noted    Type 2 diabetes mellitus with diabetic neuropathy ([address] 03/14/2018    S/P laparoscopic hysterectomy 06/24/2014    Tobacco use 05/06/2013    Diabetes mellitus type 2, diet-controlled ([address] 02/04/2013    Hyperlipidemia 02/04/2013    Anxiety 02/04/2013     Allergies   Allergen Reactions    Codeine Hives     Past Surgical History:   Procedure Laterality Date    HX APPENDECTOMY      HX CHOLECYSTECTOMY      12/2017    HX GYN      1990s: laparoscopy: dysplasia, 1997: ectopic pregnancy    HX GYN  2015    hysterectomy     HX ORTHOPAEDIC      Left Knee 2007 and removed hardware 2013    HX OTHER SURGICAL      2012: coloonscopy, polyp removal    HX OTHER SURGICAL      endoscopy     Social History   Substance Use Topics    Smoking status: Current Every Day Smoker     Packs/day: 1.00     Years: 30.00    Smokeless tobacco: Current User    Alcohol use No        Lab Results  Component Value Date/Time   WBC 8.1 03/14/2018 04:22 PM   HGB 14.6 03/14/2018 04:22 PM   HCT 42.4 03/14/2018 04:22 PM   PLATELET 820 (LL) 48/20/6192 04:22 PM   MCV 92 03/14/2018 04:22 PM     Lab Results  Component Value Date/Time   Cholesterol, total 222 (H) 03/14/2018 04:22 PM   HDL Cholesterol 40 03/14/2018 04:22 PM   LDL, calculated 110 (H) 03/14/2018 04:22 PM   Triglyceride 361 (H) 03/14/2018 04:22 PM     Lab Results  Component Value Date/Time   ALT (SGPT) 71 (H) 03/14/2018 04:22 PM   AST (SGOT) 54 (H) 03/14/2018 04:22 PM   Alk. phosphatase 100 03/14/2018 04:22 PM   Bilirubin, total 0.2 03/14/2018 04:22 PM   Albumin 4.5 03/14/2018 04:22 PM   Protein, total 7.6 03/14/2018 04:22 PM   PLATELET 562 (LL) 23/00/3934 04:22 PM       Lab Results  Component Value Date/Time   GFR est non-AA 94 03/14/2018 04:22 PM   GFR est  03/14/2018 04:22 PM   Creatinine 0.73 03/14/2018 04:22 PM   BUN 14 03/14/2018 04:22 PM   Sodium 142 03/14/2018 04:22 PM   Potassium 4.4 03/14/2018 04:22 PM   Chloride 98 03/14/2018 04:22 PM   CO2 25 03/14/2018 04:22 PM     Lab Results   Component Value Date/Time    Glucose 177 (H) 03/14/2018 04:22 PM    Glucose (POC) 133 (H) 09/24/2014 04:43 PM         Review of Systems  Pertinent items are noted in HPI. Objective:     Significant for the following:     Visit Vitals    /72 (BP 1 Location: Left arm, BP Patient Position: At rest)    Pulse 97    Temp 99.3 °F (37.4 °C) (Oral)    Resp 24    Ht 5' 6\" (1.676 m)    Wt 168 lb (76.2 kg)    LMP 06/03/2014    SpO2 100%    BMI 27.12 kg/m2     Appearance: alert, well appearing, and in no distress and anxious.   Exam: heart sounds normal rate, regular rhythm, normal S1, S2, no murmurs, rubs, clicks or gallops, chest clear  Foot exam: Deferred    Lab review: orders written for new lab studies as appropriate; see orders. Assessment/Plan:     Follow-up diabetes stable, needs to follow diet more regularly. Diabetic issues reviewed with her: all medications, side effects and compliance discussed carefully, glycohemoglobin and other lab monitoring discussed, long term diabetic complications discussed and patient urged in the strongest terms to quit smoking. Chronic Conditions Addressed Today     1.  Type 2 diabetes mellitus with diabetic neuropathy (HCC) - Primary     Relevant Medications     metFORMIN (GLUCOPHAGE) 500 mg tablet     Other Relevant Orders     CBC W/O DIFF (Completed)     METABOLIC PANEL, COMPREHENSIVE (Completed)     HEMOGLOBIN A1C WITH EAG (Completed)     [place] HANDLG&/OR CONVEY OF SPEC FOR TR OFFICE TO LAB     COLLECTION VENOUS BLOOD,VENIPUNCTURE      Acute Diagnoses Addressed Today     Encounter for immunization            Relevant Orders        PNEUMOCOCCAL CONJ VACCINE 13 VALENT IM (Completed)        [place] HANDLG&/OR CONVEY OF SPEC FOR TR OFFICE TO LAB        COLLECTION VENOUS BLOOD,VENIPUNCTURE    Chronic right-sided low back pain with right-sided sciatica            Relevant Medications        estradiol (ESTRACE) 0.5 mg tablet        Other Relevant Orders        [place] HANDLG&/OR CONVEY OF SPEC FOR TR OFFICE TO LAB        COLLECTION VENOUS BLOOD,VENIPUNCTURE    Screening mammogram, encounter for            Relevant Orders        [facility] MAMMO BI SCREENING INCL CAD        [place] HANDLG&/OR CONVEY OF SPEC FOR TR OFFICE TO LAB        COLLECTION VENOUS BLOOD,VENIPUNCTURE    Screening cholesterol level            Relevant Orders        LIPID PANEL (Completed)        [place] HANDLG&/OR CONVEY OF SPEC FOR TR OFFICE TO LAB        COLLECTION VENOUS BLOOD,VENIPUNCTURE    Menopausal and perimenopausal disorder            Relevant Medications        metFORMIN (GLUCOPHAGE) 500 mg tablet        estradiol (ESTRACE) 0.5 mg tablet        Orders Placed This Encounter    [facility] MAMMO BI SCREENING INCL CAD     Standing Status:   Future     Standing Expiration Date:   4/14/2019     Scheduling Instructions:      [facility]     Order Specific Question:   Reason for Exam     Answer:   screening    PNEUMOCOCCAL CONJ VACCINE 13 VALENT IM    CBC W/O DIFF    METABOLIC PANEL, COMPREHENSIVE    LIPID PANEL    HEMOGLOBIN A1C WITH EAG    CVD REPORT    DIABETES PATIENT EDUCATION    [place] HANDLG&/OR CONVEY OF SPEC FOR TR OFFICE TO LAB    COLLECTION VENOUS BLOOD,VENIPUNCTURE    DISCONTD: metFORMIN (GLUCOPHAGE) 500 mg tablet     Sig: Take 1 Tab by mouth daily (with breakfast). Dispense:  90 Tab     Refill:  1    DISCONTD: estradiol (ESTRACE) 0.5 mg tablet     Sig: Take 1 Tab by mouth daily. Dispense:  90 Tab     Refill:  1    metFORMIN (GLUCOPHAGE) 500 mg tablet     Sig: Take 1 Tab by mouth daily (with breakfast). Dispense:  90 Tab     Refill:  1    estradiol (ESTRACE) 0.5 mg tablet     Sig: Take 1 Tab by mouth daily. Dispense:  90 Tab     Refill:  1     Anxiety and mood lability, especially with her methadone not a candidate for a benzodiazepine. Offered her hormone therapy which has been known to help the situation and reduce her hot flashes. Discussed possible side affects, precautions, and drug interactions and possible benefits of the medication(s). Especially with her smoking. She wants to go ahead and try it. Suggested she discuss with her pain manager how to wean off methadone, may begin on some other type of pain medication. Diabetes agree that she probably should go back on her Metformin. Follow-up Disposition:  Return in about 6 weeks (around 4/25/2018) for routine follow up.

## 2018-03-16 LAB
ALBUMIN SERPL-MCNC: 4.5 G/DL (ref 3.5–5.5)
ALP SERPL-CCNC: 95 IU/L (ref 39–117)
ALT SERPL-CCNC: 68 IU/L (ref 0–32)
AST SERPL-CCNC: 47 IU/L (ref 0–40)
BILIRUB DIRECT SERPL-MCNC: 0.06 MG/DL (ref 0–0.4)
BILIRUB SERPL-MCNC: 0.2 MG/DL (ref 0–1.2)
ERYTHROCYTE [DISTWIDTH] IN BLOOD BY AUTOMATED COUNT: 14.2 % (ref 12.3–15.4)
HBV SURFACE AG SERPL QL IA: NEGATIVE
HCT VFR BLD AUTO: 42.8 % (ref 34–46.6)
HCV AB S/CO SERPL IA: <0.1 S/CO RATIO (ref 0–0.9)
HGB BLD-MCNC: 14.6 G/DL (ref 11.1–15.9)
MCH RBC QN AUTO: 30.8 PG (ref 26.6–33)
MCHC RBC AUTO-ENTMCNC: 34.1 G/DL (ref 31.5–35.7)
MCV RBC AUTO: 90 FL (ref 79–97)
PLATELET # BLD AUTO: 123 X10E3/UL (ref 150–379)
PROT SERPL-MCNC: 7.4 G/DL (ref 6–8.5)
RBC # BLD AUTO: 4.74 X10E6/UL (ref 3.77–5.28)
WBC # BLD AUTO: 8.7 X10E3/UL (ref 3.4–10.8)

## 2018-03-16 NOTE — TELEPHONE ENCOUNTER
Send normal/stable results letter. Your results are normal/stable. If not signed up, consider getting my chart to get your results on-line. We can help you to sign up. MIldly elevated liver enzymes, weight loss frequently helps this. We discussed stratagies to reduce weight. Specifically discussed the Intemmitant Fasting diet, the [address]. Discussed weight loss programs as well as exercise, although emphasized caloric restriction. Consider keeping a food diary and seeing what you eat in a day and looking for where you can cut calories. Try taking a picture of everything you eat for a day. Phone apps can help witrh weight loss. Consider 'Lose It'  You must reduce liquid calories like soda and juices. Weight Watchers or other weight loss programs can be very helpful. There is no \"magic pill', but there some newly FDA approved medications for obesity.

## 2018-04-05 ENCOUNTER — TELEPHONE (OUTPATIENT)
Dept: INTERNAL MEDICINE CLINIC | Age: 53
End: 2018-04-05

## 2018-05-08 ENCOUNTER — OFFICE VISIT (OUTPATIENT)
Dept: INTERNAL MEDICINE CLINIC | Age: 53
End: 2018-05-08

## 2018-05-08 VITALS
BODY MASS INDEX: 27 KG/M2 | HEIGHT: 66 IN | RESPIRATION RATE: 18 BRPM | WEIGHT: 168 LBS | DIASTOLIC BLOOD PRESSURE: 75 MMHG | SYSTOLIC BLOOD PRESSURE: 165 MMHG | HEART RATE: 71 BPM | OXYGEN SATURATION: 98 % | TEMPERATURE: 98.5 F

## 2018-05-08 DIAGNOSIS — R74.8 ELEVATED LIVER ENZYMES: ICD-10-CM

## 2018-05-08 DIAGNOSIS — G89.29 CHRONIC MIDLINE LOW BACK PAIN WITH SCIATICA, SCIATICA LATERALITY UNSPECIFIED: Primary | ICD-10-CM

## 2018-05-08 DIAGNOSIS — E11.65 TYPE 2 DIABETES MELLITUS WITH HYPERGLYCEMIA, WITHOUT LONG-TERM CURRENT USE OF INSULIN (HCC): ICD-10-CM

## 2018-05-08 DIAGNOSIS — F41.9 ANXIETY: ICD-10-CM

## 2018-05-08 DIAGNOSIS — M54.40 CHRONIC MIDLINE LOW BACK PAIN WITH SCIATICA, SCIATICA LATERALITY UNSPECIFIED: Primary | ICD-10-CM

## 2018-05-08 LAB
BILIRUB UR QL STRIP: NORMAL
GLUCOSE UR-MCNC: NEGATIVE MG/DL
KETONES P FAST UR STRIP-MCNC: NORMAL MG/DL
PH UR STRIP: 5 [PH] (ref 4.6–8)
PROT UR QL STRIP: NORMAL
SP GR UR STRIP: 1.03 (ref 1–1.03)
UA UROBILINOGEN AMB POC: NORMAL (ref 0.2–1)
URINALYSIS CLARITY POC: CLEAR
URINALYSIS COLOR POC: NORMAL
URINE BLOOD POC: NORMAL
URINE LEUKOCYTES POC: NEGATIVE
URINE NITRITES POC: NEGATIVE

## 2018-05-08 RX ORDER — DULOXETIN HYDROCHLORIDE 20 MG/1
20 CAPSULE, DELAYED RELEASE ORAL DAILY
Qty: 30 CAP | Refills: 5 | Status: SHIPPED | OUTPATIENT
Start: 2018-05-08 | End: 2019-04-22

## 2018-05-08 RX ORDER — FUROSEMIDE 20 MG/1
TABLET ORAL
Qty: 30 TAB | Refills: 1 | Status: SHIPPED | OUTPATIENT
Start: 2018-05-08 | End: 2019-04-22 | Stop reason: SDUPTHER

## 2018-05-08 NOTE — PROGRESS NOTES
Subjective:     Katarina Lagunas is a 48 y.o. female seen for follow-up of diabetes. She has had hypoglycemic attacks. .no  Blood sugar control has been ok  She has diabetes and hyperlipidemia. Katarina Lagunas has the additional concern of agitated today from her stomach bothering her. Back is also greatly bothering her. Very worried about her kidneys. Not really having urine complaints. Her creatinine levels when I checked have been within normal limits. No specific injury. Has had long-standing back pain since her current pain is different, higher than before and more severe. Lots of anxiety complaints. Feels somewhat swollen and feels would benefit from a water pill. Abdominal pain for several weeks as well. Somewhat upper bilateral pain. Takes her methadone for her pain manager feels it makes her swell she is going to discuss it with him next week. Wants to get off. Doesn't help her back. Past Surgical History:   Procedure Laterality Date    HX APPENDECTOMY      HX CHOLECYSTECTOMY      12/2017    HX GYN      1990s: laparoscopy: dysplasia, 1997: ectopic pregnancy    HX GYN  2015    hysterectomy     HX HYSTERECTOMY      HX ORTHOPAEDIC      Left Knee 2007 and removed hardware 2013    HX OTHER SURGICAL      2012: coloonscopy, polyp removal    HX OTHER SURGICAL      endoscopy         Diabetic Review of Systems: no polyuria or polydipsia, no chest pain, dyspnea or TIA's, has dysesthesias in the feet. Allergies   Allergen Reactions    Codeine Hives       Diet and Lifestyle: smoker 1 pack per day.     Patient Active Problem List    Diagnosis Date Noted    Type 2 diabetes mellitus with hyperglycemia, without long-term current use of insulin ([address] 05/08/2018    Type 2 diabetes mellitus with diabetic neuropathy ([address] 03/14/2018    S/P laparoscopic hysterectomy 06/24/2014    Tobacco use 05/06/2013    Hyperlipidemia 02/04/2013    Anxiety 02/04/2013        Lab Results  Component Value Date/Time   WBC 8.7 03/15/2018 12:00 AM   HGB 14.6 03/15/2018 12:00 AM   HCT 42.8 03/15/2018 12:00 AM   PLATELET 481 (L) 87/86/8831 12:00 AM   MCV 90 03/15/2018 12:00 AM     Lab Results  Component Value Date/Time   Hemoglobin A1c 7.7 (H) 03/14/2018 04:22 PM   Hemoglobin A1c 7.0 (H) 07/28/2016 12:10 PM   Hemoglobin A1c 5.7 (H) 03/16/2015 12:13 PM   Glucose 177 (H) 03/14/2018 04:22 PM   Glucose (POC) 133 (H) 09/24/2014 04:43 PM   Microalb/Creat ratio (ug/mg creat.) 11.7 05/08/2018 03:46 PM   LDL, calculated 110 (H) 03/14/2018 04:22 PM   Creatinine 0.73 03/14/2018 04:22 PM      Lab Results  Component Value Date/Time   Cholesterol, total 222 (H) 03/14/2018 04:22 PM   HDL Cholesterol 40 03/14/2018 04:22 PM   LDL, calculated 110 (H) 03/14/2018 04:22 PM   Triglyceride 361 (H) 03/14/2018 04:22 PM     Lab Results  Component Value Date/Time   ALT (SGPT) 68 (H) 03/15/2018 12:00 AM   AST (SGOT) 47 (H) 03/15/2018 12:00 AM   Alk. phosphatase 95 03/15/2018 12:00 AM   Bilirubin, direct 0.06 03/15/2018 12:00 AM   Bilirubin, total 0.2 03/15/2018 12:00 AM   Albumin 4.5 03/15/2018 12:00 AM   Protein, total 7.4 03/15/2018 12:00 AM   PLATELET 642 (L) 99/44/2537 12:00 AM       Lab Results  Component Value Date/Time   GFR est non-AA 94 03/14/2018 04:22 PM   GFR est  03/14/2018 04:22 PM   Creatinine 0.73 03/14/2018 04:22 PM   BUN 14 03/14/2018 04:22 PM   Sodium 142 03/14/2018 04:22 PM   Potassium 4.4 03/14/2018 04:22 PM   Chloride 98 03/14/2018 04:22 PM   CO2 25 03/14/2018 04:22 PM     Lab Results  Component Value Date/Time   TSH 1.490 03/28/2014 09:37 AM      Lab Results   Component Value Date/Time    Glucose 177 (H) 03/14/2018 04:22 PM    Glucose (POC) 133 (H) 09/24/2014 04:43 PM         Review of Systems  Pertinent items are noted in HPI.     Objective:     Significant for the following:     Visit Vitals    /75 (BP 1 Location: Left arm, BP Patient Position: Sitting)    Pulse 71    Temp 98.5 °F (36.9 °C) (Oral)    Resp 18    Ht 5' 5.5\" (1.664 m)    Wt 168 lb (76.2 kg)    LMP 06/03/2014    SpO2 98%    BMI 27.53 kg/m2     Appearance: anxious. Exam: heart sounds normal rate, regular rhythm, normal S1, S2, no murmurs, rubs, clicks or gallops, chest clear, Abdo soft no guarding or rebound, + BS  Foot exam: def    Lab review: orders written for new lab studies as appropriate; see orders. Assessment/Plan:     Follow-up diabetes stable. Diabetic issues reviewed with her: patient urged in the strongest terms to quit smoking. Orders Placed This Encounter    XR SPINE LUMB 2 OR 3 V     Standing Status:   Future     Standing Expiration Date:   6/7/2019     Scheduling Instructions:      [facility]     Order Specific Question:   Reason for Exam     Answer:   back pain x 1 year     Order Specific Question:   Is Patient Allergic to Contrast Dye? Answer:   No    US ABD LTD     Standing Status:   Future     Standing Expiration Date:   6/8/2019     Scheduling Instructions:      [facility]     Order Specific Question:   Specific Body Part     Answer:   liver gall bladder also please assess kidneys Fh of kidney problems    MICROALBUMIN, UR, RAND W/ MICROALB/CREAT RATIO    AMB POC URINALYSIS DIP STICK AUTO W/O MICRO    DULoxetine (CYMBALTA) 20 mg capsule     Sig: Take 1 Cap by mouth daily. Dispense:  30 Cap     Refill:  5    furosemide (LASIX) 20 mg tablet     Sig: Daily as needed for swelling     Dispense:  30 Tab     Refill:  1     Chronic Conditions Addressed Today     1. Anxiety     Relevant Medications     DULoxetine (CYMBALTA) 20 mg capsule    2.  Type 2 diabetes mellitus with hyperglycemia, without long-term current use of insulin (HCC)     Relevant Orders     MICROALBUMIN, UR, RAND W/ MICROALB/CREAT RATIO (Completed)      Acute Diagnoses Addressed Today     Chronic midline low back pain with sciatica, sciatica laterality unspecified    -  Primary        Relevant Medications        DULoxetine (CYMBALTA) 20 mg capsule Other Relevant Orders        XR SPINE LUMB 2 OR 3 V        AMB POC URINALYSIS DIP STICK AUTO W/O MICRO (Completed)    Elevated liver enzymes            Relevant Orders        US ABD LTD        Discussed possible side affects, precautions, and drug interactions and possible benefits of the medication(s). Abdo precautions given   Follow-up Disposition:  Return in about 6 weeks (around 6/19/2018) for routine follow up.

## 2018-05-08 NOTE — PROGRESS NOTES
Chief Complaint   Patient presents with    Abdominal Pain     diarrhea last three days     I have reviewed the patient's medical history in detail and updated the computerized patient record. Health Maintenance reviewed. 1. Have you been to the ER, urgent care clinic since your last visit? Hospitalized since your last visit?no    2. Have you seen or consulted any other health care providers outside of the [address] [place] [address] since your last visit? Include any pap smears or colon screening. No    Encouraged pt to discuss pt's wishes with spouse/partner/family and bring them in the next appt to follow thru with the Advanced Directive    Fall Risk Assessment, last 12 mths 3/3/2016   Able to walk? Yes   Fall in past 12 months? No       PHQ over the last two weeks 5/8/2018   Little interest or pleasure in doing things Several days   Feeling down, depressed or hopeless Several days   Total Score PHQ 2 2       Abuse Screening Questionnaire 3/3/2016   Do you ever feel afraid of your partner? N   Are you in a relationship with someone who physically or mentally threatens you? N   Is it safe for you to go home?  Y       ADL Assessment 3/3/2016   Feeding yourself No Help Needed   Getting from bed to chair No Help Needed   Getting dressed No Help Needed   Bathing or showering No Help Needed   Walk across the room (includes cane/walker) No Help Needed   Using the telphone No Help Needed   Taking your medications No Help Needed   Preparing meals No Help Needed   Managing money (expenses/bills) No Help Needed   Moderately strenuous housework (laundry) No Help Needed   Shopping for personal items (toiletries/medicines) No Help Needed   Shopping for groceries No Help Needed   Driving No Help Needed   Climbing a flight of stairs No Help Needed   Getting to places beyond walking distances No Help Needed
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
## 2018-05-09 LAB
ALBUMIN/CREAT UR: 11.7 MG/G CREAT (ref 0–30)
CREAT UR-MCNC: 350.8 MG/DL
MICROALBUMIN UR-MCNC: 41 UG/ML

## 2018-06-25 ENCOUNTER — TELEPHONE (OUTPATIENT)
Dept: INTERNAL MEDICINE CLINIC | Age: 53
End: 2018-06-25

## 2018-06-25 NOTE — TELEPHONE ENCOUNTER
----- Message from Lalo Shepherd sent at 6/25/2018 10:41 AM EDT -----  Regarding: /Telephone  Pt is requesting a call back in reference to her lab results from previous visit.  P [phone]
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
## 2018-07-23 RX ORDER — BUTALBITAL, ACETAMINOPHEN AND CAFFEINE 300; 40; 50 MG/1; MG/1; MG/1
1 CAPSULE ORAL
Qty: 14 CAP | Refills: 0 | Status: SHIPPED | OUTPATIENT
Start: 2018-07-23 | End: 2018-12-12 | Stop reason: SDUPTHER

## 2018-07-23 NOTE — TELEPHONE ENCOUNTER
Requested Prescriptions     Pending Prescriptions Disp Refills    butalbital-acetaminophen-caff (FIORICET) -40 mg per capsule 14 Cap 0     Sig: Take 1 Cap by mouth every six (6) hours as needed for Pain.

## 2018-07-23 NOTE — TELEPHONE ENCOUNTER
Last office visit:  5/8/18  Last filled:  Rober George (not by Dr Homa Curry or Zach np) 4/30/18 #14 X 1 refill  No changes  No follow up scheduled

## 2018-07-23 NOTE — TELEPHONE ENCOUNTER
Pt would like a nurse in reference to her fiorecet. She would like to know if it will be filled. If not she is going to ER because her head is really hurting. I informed pt that the message has been and we are waiting for response. Please call her at [phone].

## 2018-08-01 ENCOUNTER — DOCUMENTATION ONLY (OUTPATIENT)
Dept: INTERNAL MEDICINE CLINIC | Age: 53
End: 2018-08-01

## 2018-08-24 RX ORDER — METFORMIN HYDROCHLORIDE 500 MG/1
TABLET ORAL
Qty: 90 TAB | Refills: 1 | Status: SHIPPED | OUTPATIENT
Start: 2018-08-24 | End: 2019-04-22 | Stop reason: SDDI

## 2018-08-24 RX ORDER — METFORMIN HYDROCHLORIDE 500 MG/1
TABLET ORAL
Qty: 90 TAB | Refills: 0 | Status: SHIPPED | OUTPATIENT
Start: 2018-08-24 | End: 2019-01-23 | Stop reason: ALTCHOICE

## 2018-12-12 RX ORDER — BUTALBITAL, ACETAMINOPHEN AND CAFFEINE 300; 40; 50 MG/1; MG/1; MG/1
1 CAPSULE ORAL
Qty: 14 CAP | Refills: 0 | Status: CANCELLED | OUTPATIENT
Start: 2018-12-12

## 2018-12-12 RX ORDER — BUTALBITAL, ACETAMINOPHEN AND CAFFEINE 300; 40; 50 MG/1; MG/1; MG/1
CAPSULE ORAL
Qty: 14 CAP | Refills: 0 | Status: SHIPPED | OUTPATIENT
Start: 2018-12-12 | End: 2019-04-22 | Stop reason: ALTCHOICE

## 2019-01-09 ENCOUNTER — CLINICAL SUPPORT (OUTPATIENT)
Dept: INTERNAL MEDICINE CLINIC | Age: 54
End: 2019-01-09

## 2019-01-09 DIAGNOSIS — Z23 ENCOUNTER FOR IMMUNIZATION: Primary | ICD-10-CM

## 2019-01-09 RX ORDER — ESTRADIOL 0.5 MG/1
0.5 TABLET ORAL DAILY
Qty: 90 TAB | Refills: 1 | Status: SHIPPED | OUTPATIENT
Start: 2019-01-09 | End: 2019-04-22 | Stop reason: SDUPTHER

## 2019-01-09 NOTE — TELEPHONE ENCOUNTER
Requested Prescriptions     Pending Prescriptions Disp Refills    estradiol (ESTRACE) 0.5 mg tablet 90 Tab 1     Sig: Take 1 Tab by mouth daily.      Future Appointments:  No future appointments.    Last Appointment With Me:  05.08.2018   Last Filled:  03.14.2018  Changes Made to Medication on Last Visit:  None

## 2019-01-09 NOTE — PATIENT INSTRUCTIONS
Vaccine Information Statement    Influenza (Flu) Vaccine (Inactivated or Recombinant): What you need to know    Many Vaccine Information Statements are available in Pashto and other languages. See www.immunize.org/vis  Hojas de Información Sobre Vacunas están disponibles en Español y en muchos otros idiomas. Visite www.immunize.org/vis    1. Why get vaccinated? Influenza (flu) is a contagious disease that spreads around the [place] every year, usually between October and May. Flu is caused by influenza viruses, and is spread mainly by coughing, sneezing, and close contact. Anyone can get flu. Flu strikes suddenly and can last several days. Symptoms vary by age, but can include:   fever/chills   sore throat   muscle aches   fatigue   cough   headache    runny or stuffy nose    Flu can also lead to pneumonia and blood infections, and cause diarrhea and seizures in children. If you have a medical condition, such as heart or lung disease, flu can make it worse. Flu is more dangerous for some people. Infants and young children, people 72years of age and older, pregnant women, and people with certain health conditions or a weakened immune system are at greatest risk. Each year thousands of people in the [facility] die from flu, and many more are hospitalized. Flu vaccine can:   keep you from getting flu,   make flu less severe if you do get it, and   keep you from spreading flu to your family and other people. 2. Inactivated and recombinant flu vaccines    A dose of flu vaccine is recommended every flu season. Children 6 months through 6years of age may need two doses during the same flu season. Everyone else needs only one dose each flu season.        Some inactivated flu vaccines contain a very small amount of a mercury-based preservative called thimerosal. Studies have not shown thimerosal in vaccines to be harmful, but flu vaccines that do not contain thimerosal are available. There is no live flu virus in flu shots. They cannot cause the flu. There are many flu viruses, and they are always changing. Each year a new flu vaccine is made to protect against three or four viruses that are likely to cause disease in the upcoming flu season. But even when the vaccine doesnt exactly match these viruses, it may still provide some protection    Flu vaccine cannot prevent:   flu that is caused by a virus not covered by the vaccine, or   illnesses that look like flu but are not. It takes about 2 weeks for protection to develop after vaccination, and protection lasts through the flu season. 3. Some people should not get this vaccine    Tell the person who is giving you the vaccine:     If you have any severe, life-threatening allergies. If you ever had a life-threatening allergic reaction after a dose of flu vaccine, or have a severe allergy to any part of this vaccine, you may be advised not to get vaccinated. Most, but not all, types of flu vaccine contain a small amount of egg protein.  If you ever had Guillain-Barré Syndrome (also called GBS). Some people with a history of GBS should not get this vaccine. This should be discussed with your doctor.  If you are not feeling well. It is usually okay to get flu vaccine when you have a mild illness, but you might be asked to come back when you feel better. 4. Risks of a vaccine reaction    With any medicine, including vaccines, there is a chance of reactions. These are usually mild and go away on their own, but serious reactions are also possible. Most people who get a flu shot do not have any problems with it.      Minor problems following a flu shot include:    soreness, redness, or swelling where the shot was given     hoarseness   sore, red or itchy eyes   cough   fever   aches   headache   itching   fatigue  If these problems occur, they usually begin soon after the shot and last 1 or 2 days. More serious problems following a flu shot can include the following:     There may be a small increased risk of Guillain-Barré Syndrome (GBS) after inactivated flu vaccine. This risk has been estimated at 1 or 2 additional cases per million people vaccinated. This is much lower than the risk of severe complications from flu, which can be prevented by flu vaccine.  Young children who get the flu shot along with pneumococcal vaccine (PCV13) and/or DTaP vaccine at the same time might be slightly more likely to have a seizure caused by fever. Ask your doctor for more information. Tell your doctor if a child who is getting flu vaccine has ever had a seizure. Problems that could happen after any injected vaccine:      People sometimes faint after a medical procedure, including vaccination. Sitting or lying down for about 15 minutes can help prevent fainting, and injuries caused by a fall. Tell your doctor if you feel dizzy, or have vision changes or ringing in the ears.  Some people get severe pain in the shoulder and have difficulty moving the arm where a shot was given. This happens very rarely.  Any medication can cause a severe allergic reaction. Such reactions from a vaccine are very rare, estimated at about 1 in a million doses, and would happen within a few minutes to a few hours after the vaccination. As with any medicine, there is a very remote chance of a vaccine causing a serious injury or death. The safety of vaccines is always being monitored. For more information, visit: www.cdc.gov/vaccinesafety/    5. What if there is a serious reaction? What should I look for?  Look for anything that concerns you, such as signs of a severe allergic reaction, very high fever, or unusual behavior.     Signs of a severe allergic reaction can include hives, swelling of the face and throat, difficulty breathing, a fast heartbeat, dizziness, and weakness - usually within a few minutes to a few hours after the vaccination. What should I do?  If you think it is a severe allergic reaction or other emergency that cant wait, call 9-1-1 and get the person to the nearest hospital. Otherwise, call your doctor.  Reactions should be reported to the Vaccine Adverse Event Reporting System (VAERS). Your doctor should file this report, or you can do it yourself through  the VAERS web site at www.vaers. [facility].gov, or by calling 4-516.132.5784. VAERS does not give medical advice. 6. The National Vaccine Injury Compensation Program    The [facility] Vaccine Injury Compensation Program (VICP) is a federal program that was created to compensate people who may have been injured by certain vaccines. Persons who believe they may have been injured by a vaccine can learn about the program and about filing a claim by calling 3-615.621.4427 or visiting the [employer] website at www.[facility].gov/vaccinecompensation. There is a time limit to file a claim for compensation. 7. How can I learn more?  Ask your healthcare provider. He or she can give you the vaccine package insert or suggest other sources of information.  Call your local or state health department.  Contact the Centers for Disease Control and Prevention (CDC):  - Call 8-563.381.1433 (1-800-CDC-INFO) or  - Visit CDCs website at www.cdc.gov/flu    Vaccine Information Statement   Inactivated Influenza Vaccine   8/7/2015  [address] [id number]    Department of Health and Human Services  Centers for Disease Control and Prevention    Office Use Only

## 2019-01-09 NOTE — PROGRESS NOTES
Chief Complaint   Patient presents with    Immunization/Injection     FLULAVAL     Immunization/s administered 1/9/2019 by David Castorena LPN. Patient tolerated procedure well. Immunization given in left deltoid. No reactions noted.

## 2019-01-09 NOTE — TELEPHONE ENCOUNTER
Requested Prescriptions     Pending Prescriptions Disp Refills    estradiol (ESTRACE) 0.5 mg tablet 90 Tab 1     Sig: Take 1 Tab by mouth daily.

## 2019-01-23 ENCOUNTER — OFFICE VISIT (OUTPATIENT)
Dept: INTERNAL MEDICINE CLINIC | Age: 54
End: 2019-01-23

## 2019-01-23 VITALS
SYSTOLIC BLOOD PRESSURE: 136 MMHG | BODY MASS INDEX: 26.52 KG/M2 | OXYGEN SATURATION: 92 % | RESPIRATION RATE: 16 BRPM | HEIGHT: 66 IN | WEIGHT: 165 LBS | DIASTOLIC BLOOD PRESSURE: 85 MMHG | TEMPERATURE: 98.3 F | HEART RATE: 105 BPM

## 2019-01-23 DIAGNOSIS — G89.4 CHRONIC PAIN SYNDROME: ICD-10-CM

## 2019-01-23 DIAGNOSIS — E78.2 MIXED HYPERLIPIDEMIA: ICD-10-CM

## 2019-01-23 DIAGNOSIS — Z23 ENCOUNTER FOR IMMUNIZATION: Primary | ICD-10-CM

## 2019-01-23 DIAGNOSIS — M54.41 LUMBAGO WITH SCIATICA, RIGHT SIDE: ICD-10-CM

## 2019-01-23 DIAGNOSIS — E11.40 TYPE 2 DIABETES MELLITUS WITH DIABETIC NEUROPATHY, WITHOUT LONG-TERM CURRENT USE OF INSULIN (HCC): ICD-10-CM

## 2019-01-23 RX ORDER — GABAPENTIN 600 MG/1
600 TABLET ORAL 3 TIMES DAILY
Qty: 90 TAB | Refills: 5 | Status: SHIPPED | OUTPATIENT
Start: 2019-01-23 | End: 2019-01-23 | Stop reason: SDUPTHER

## 2019-01-23 RX ORDER — FLUOXETINE 20 MG/1
20 TABLET ORAL DAILY
Qty: 30 TAB | Refills: 5 | Status: SHIPPED | OUTPATIENT
Start: 2019-01-23 | End: 2019-04-22 | Stop reason: SDDI

## 2019-01-23 RX ORDER — GABAPENTIN 600 MG/1
600 TABLET ORAL 3 TIMES DAILY
Qty: 90 TAB | Refills: 5 | Status: SHIPPED | OUTPATIENT
Start: 2019-01-23 | End: 2019-04-22 | Stop reason: SDUPTHER

## 2019-01-23 NOTE — PROGRESS NOTES
Chief Complaint Patient presents with  Diabetes  
  follow up I have reviewed the patient's medical history in detail and updated the computerized patient record. Health Maintenance reviewed. , 
 
1. Have you been to the ER, urgent care clinic since your last visit? Hospitalized since your last visit?no 2. Have you seen or consulted any other health care providers outside of the [address] [place] [address] since your last visit? Include any pap smears or colon screening. No 
 
 
Encouraged pt to discuss pt's wishes with spouse/partner/family and bring them in the next appt to follow thru with the Advanced Directive Fall Risk Assessment, last 12 mths 1/23/2019 Able to walk? Yes Fall in past 12 months? No  
 
 
PHQ over the last two weeks 1/23/2019 Little interest or pleasure in doing things Several days Feeling down, depressed, irritable, or hopeless Several days Total Score PHQ 2 2 Abuse Screening Questionnaire 1/23/2019 Do you ever feel afraid of your partner? Lorie Cesar Are you in a relationship with someone who physically or mentally threatens you? Lorie Guerrero Is it safe for you to go home? Y  
 
 

## 2019-01-23 NOTE — PATIENT INSTRUCTIONS
Learning About Diabetes Food Guidelines Your Care Instructions Meal planning is important to manage diabetes. It helps keep your blood sugar at a target level (which you set with your doctor). You don't have to eat special foods. You can eat what your family eats, including sweets once in a while. But you do have to pay attention to how often you eat and how much you eat of certain foods. You may want to work with a dietitian or a certified diabetes educator (CDE) to help you plan meals and snacks. A dietitian or CDE can also help you lose weight if that is one of your goals. What should you know about eating carbs? Managing the amount of carbohydrate (carbs) you eat is an important part of healthy meals when you have diabetes. Carbohydrate is found in many foods. · Learn which foods have carbs. And learn the amounts of carbs in different foods. ? Bread, cereal, pasta, and rice have about 15 grams of carbs in a serving. A serving is 1 slice of bread (1 ounce), ½ cup of cooked cereal, or 1/3 cup of cooked pasta or rice. ? Fruits have 15 grams of carbs in a serving. A serving is 1 small fresh fruit, such as an apple or orange; ½ of a banana; ½ cup of cooked or canned fruit; ½ cup of fruit juice; 1 cup of melon or raspberries; or 2 tablespoons of dried fruit. ? Milk and no-sugar-added yogurt have 15 grams of carbs in a serving. A serving is 1 cup of milk or 2/3 cup of no-sugar-added yogurt. ? Starchy vegetables have 15 grams of carbs in a serving. A serving is ½ cup of mashed potatoes or sweet potato; 1 cup winter squash; ½ of a small baked potato; ½ cup of cooked beans; or ½ cup cooked corn or green peas. · Learn how much carbs to eat each day and at each meal. A dietitian or CDE can teach you how to keep track of the amount of carbs you eat. This is called carbohydrate counting.  
· If you are not sure how to count carbohydrate grams, use the Plate Method to plan meals. It is a good, quick way to make sure that you have a balanced meal. It also helps you spread carbs throughout the day. ? Divide your plate by types of foods. Put non-starchy vegetables on half the plate, meat or other protein food on one-quarter of the plate, and a grain or starchy vegetable in the final quarter of the plate. To this you can add a small piece of fruit and 1 cup of milk or yogurt, depending on how many carbs you are supposed to eat at a meal. 
· Try to eat about the same amount of carbs at each meal. Do not \"save up\" your daily allowance of carbs to eat at one meal. 
· Proteins have very little or no carbs per serving. Examples of proteins are beef, chicken, turkey, fish, eggs, tofu, cheese, cottage cheese, and peanut butter. A serving size of meat is 3 ounces, which is about the size of a deck of cards. Examples of meat substitute serving sizes (equal to 1 ounce of meat) are 1/4 cup of cottage cheese, 1 egg, 1 tablespoon of peanut butter, and ½ cup of tofu. How can you eat out and still eat healthy? · Learn to estimate the serving sizes of foods that have carbohydrate. If you measure food at home, it will be easier to estimate the amount in a serving of restaurant food. · If the meal you order has too much carbohydrate (such as potatoes, corn, or baked beans), ask to have a low-carbohydrate food instead. Ask for a salad or green vegetables. · If you use insulin, check your blood sugar before and after eating out to help you plan how much to eat in the future. · If you eat more carbohydrate at a meal than you had planned, take a walk or do other exercise. This will help lower your blood sugar. What else should you know? · Limit saturated fat, such as the fat from meat and dairy products. This is a healthy choice because people who have diabetes are at higher risk of heart disease.  So choose lean cuts of meat and nonfat or low-fat dairy products. Use olive or canola oil instead of butter or shortening when cooking. · Don't skip meals. Your blood sugar may drop too low if you skip meals and take insulin or certain medicines for diabetes. · Check with your doctor before you drink alcohol. Alcohol can cause your blood sugar to drop too low. Alcohol can also cause a bad reaction if you take certain diabetes medicines. Follow-up care is a key part of your treatment and safety. Be sure to make and go to all appointments, and call your doctor if you are having problems. It's also a good idea to know your test results and keep a list of the medicines you take. Where can you learn more? Go to http://sea-clark.info/. Enter L396 in the search box to learn more about \"Learning About Diabetes Food Guidelines. \" Current as of: July 25, 2018 Content Version: 11.9 © 2612-0167 [employer]. Care instructions adapted under license by [employer] (which disclaims liability or warranty for this information). If you have questions about a medical condition or this instruction, always ask your healthcare professional. [address] any warranty or liability for your use of this information. Learning About Meal Planning for Diabetes Why plan your meals? Meal planning can be a key part of managing diabetes. Planning meals and snacks with the right balance of carbohydrate, protein, and fat can help you keep your blood sugar at the target level you set with your doctor. You don't have to eat special foods. You can eat what your family eats, including sweets once in a while. But you do have to pay attention to how often you eat and how much you eat of certain foods. You may want to work with a dietitian or a certified diabetes educator. He or she can give you tips and meal ideas and can answer your questions about meal planning.  This health professional can also help you reach a healthy weight if that is one of your goals. What plan is right for you? Your dietitian or diabetes educator may suggest that you start with the plate format or carbohydrate counting. The plate format The plate format is a simple way to help you manage how you eat. You plan meals by learning how much space each food should take on a plate. Using the plate format helps you spread carbohydrate throughout the day. It can make it easier to keep your blood sugar level within your target range. It also helps you see if you're eating healthy portion sizes. To use the plate format, you put non-starchy vegetables on half your plate. Add meat or meat substitutes on one-quarter of the plate. Put a grain or starchy vegetable (such as brown rice or a potato) on the final quarter of the plate. You can add a small piece of fruit and some low-fat or fat-free milk or yogurt, depending on your carbohydrate goal for each meal. 
Here are some tips for using the plate format: · Make sure that you are not using an oversized plate. A 9-inch plate is best. Many restaurants use larger plates. · Get used to using the plate format at home. Then you can use it when you eat out. · Write down your questions about using the plate format. Talk to your doctor, a dietitian, or a diabetes educator about your concerns. Carbohydrate counting With carbohydrate counting, you plan meals based on the amount of carbohydrate in each food. Carbohydrate raises blood sugar higher and more quickly than any other nutrient. It is found in desserts, breads and cereals, and fruit. It's also found in starchy vegetables such as potatoes and corn, grains such as rice and pasta, and milk and yogurt. Spreading carbohydrate throughout the day helps keep your blood sugar levels within your target range.  
Your daily amount depends on several things, including your weight, how active you are, which diabetes medicines you take, and what your goals are for your blood sugar levels. A registered dietitian or diabetes educator can help you plan how much carbohydrate to include in each meal and snack. A guideline for your daily amount of carbohydrate is: · 45 to 60 grams at each meal. That's about the same as 3 to 4 carbohydrate servings. · 15 to 20 grams at each snack. That's about the same as 1 carbohydrate serving. The Nutrition Facts label on packaged foods tells you how much carbohydrate is in a serving of the food. First, look at the serving size on the food label. Is that the amount you eat in a serving? All of the nutrition information on a food label is based on that serving size. So if you eat more or less than that, you'll need to adjust the other numbers. Total carbohydrate is the next thing you need to look for on the label. If you count carbohydrate servings, one serving of carbohydrate is 15 grams. For foods that don't come with labels, such as fresh fruits and vegetables, you'll need a guide that lists carbohydrate in these foods. Ask your doctor, dietitian, or diabetes educator about books or other nutrition guides you can use. If you take insulin, you need to know how many grams of carbohydrate are in a meal. This lets you know how much rapid-acting insulin to take before you eat. If you use an insulin pump, you get a constant rate of insulin during the day. So the pump must be programmed at meals to give you extra insulin to cover the rise in blood sugar after meals. When you know how much carbohydrate you will eat, you can take the right amount of insulin. Or, if you always use the same amount of insulin, you need to make sure that you eat the same amount of carbohydrate at meals. If you need more help to understand carbohydrate counting and food labels, ask your doctor, dietitian, or diabetes educator. How do you get started with meal planning? Here are some tips to get started: · Plan your meals a week at a time. Don't forget to include snacks too. · Use cookbooks or online recipes to plan several main meals. Plan some quick meals for busy nights. You also can double some recipes that freeze well. Then you can save half for other busy nights when you don't have time to cook. · Make sure you have the ingredients you need for your recipes. If you're running low on basic items, put these items on your shopping list too. · List foods that you use to make breakfasts, lunches, and snacks. List plenty of fruits and vegetables. · Post this list on the refrigerator. Add to it as you think of more things you need. · Take the list to the store to do your weekly shopping. Follow-up care is a key part of your treatment and safety. Be sure to make and go to all appointments, and call your doctor if you are having problems. It's also a good idea to know your test results and keep a list of the medicines you take. Where can you learn more? Go to http://sea-clark.info/. Martin Henao in the search box to learn more about \"Learning About Meal Planning for Diabetes. \" Current as of: July 25, 2018 Content Version: 11.9 © 0319-3958 [employer]. Care instructions adapted under license by [employer] (which disclaims liability or warranty for this information). If you have questions about a medical condition or this instruction, always ask your healthcare professional. [place] [address] any warranty or liability for your use of this information.

## 2019-01-23 NOTE — PROGRESS NOTES
Subjective:  
 
Jet Wade is a 48 y.o. female seen for follow-up of diabetes. She has had hypoglycemic attacks. .no Blood sugar control has been ok She has diabetes and hyperlipidemia. Requesting pneumonia vaccine, her friend has a respiratory illness. Jet Wade has the additional concern of relates that her anxiety is pretty bad and she wants to get back on a anxiety medicine like Xanax however she is still on methadone. Going to try and get off that and onto Suboxone. Asks me about that. Takes the narcotic agents for her pain. Gabapentin helps some but the effects wear off earlier than she likes. Diabetic Review of Systems: no polyuria or polydipsia, no chest pain, dyspnea or TIA's, no hypoglycemia, has dysesthesias in the feet. Allergies Allergen Reactions  Codeine Hives Diet and Lifestyle: follows a diabetic diet regularly, smoker A pack a day. Patient Active Problem List  
 Diagnosis Date Noted  Type 2 diabetes mellitus with hyperglycemia, without long-term current use of insulin ([facility] 05/08/2018  Type 2 diabetes mellitus with diabetic neuropathy ([facility] 03/14/2018  S/P laparoscopic hysterectomy 06/24/2014  Tobacco use 05/06/2013  Hyperlipidemia 02/04/2013  Anxiety 02/04/2013 Allergies Allergen Reactions  Codeine Hives Social History Tobacco Use  Smoking status: Current Every Day Smoker Packs/day: 1.00 Years: 30.00 Pack years: 30.00  Smokeless tobacco: Current User Substance Use Topics  Alcohol use: No  
  
 
Lab Results Component Value Date/Time WBC 8.7 03/15/2018 12:00 AM  
 HGB 14.6 03/15/2018 12:00 AM  
 HCT 42.8 03/15/2018 12:00 AM  
 PLATELET 695 (L) 03/36/6055 12:00 AM  
 MCV 90 03/15/2018 12:00 AM  
 
Lab Results Component Value Date/Time  Hemoglobin A1c 7.8 (H) 01/23/2019 03:39 PM  
 Hemoglobin A1c 7.7 (H) 03/14/2018 04:22 PM  
 Hemoglobin A1c 7.0 (H) 07/28/2016 12:10 PM  
 Glucose 133 (H) 01/23/2019 03:39 PM  
 Glucose (POC) 133 (H) 09/24/2014 04:43 PM  
 Microalb/Creat ratio (ug/mg creat.) 11.7 05/08/2018 03:46 PM  
 LDL, calculated 107 (H) 01/23/2019 03:39 PM  
 Creatinine 0.81 01/23/2019 03:39 PM  
  
Lab Results Component Value Date/Time Cholesterol, total 197 01/23/2019 03:39 PM  
 HDL Cholesterol 42 01/23/2019 03:39 PM  
 LDL, calculated 107 (H) 01/23/2019 03:39 PM  
 Triglyceride 238 (H) 01/23/2019 03:39 PM  
 
Lab Results Component Value Date/Time ALT (SGPT) 68 (H) 03/15/2018 12:00 AM  
 AST (SGOT) 47 (H) 03/15/2018 12:00 AM  
 Alk. phosphatase 95 03/15/2018 12:00 AM  
 Bilirubin, direct 0.06 03/15/2018 12:00 AM  
 Bilirubin, total 0.2 03/15/2018 12:00 AM  
 Albumin 4.5 03/15/2018 12:00 AM  
 Protein, total 7.4 03/15/2018 12:00 AM  
 PLATELET 903 (L) 08/45/4540 12:00 AM  
 
Lab Results Component Value Date/Time GFR est non-AA 83 01/23/2019 03:39 PM  
 GFR est AA 96 01/23/2019 03:39 PM  
 Creatinine 0.81 01/23/2019 03:39 PM  
 BUN 13 01/23/2019 03:39 PM  
 Sodium 141 01/23/2019 03:39 PM  
 Potassium 4.0 01/23/2019 03:39 PM  
 Chloride 101 01/23/2019 03:39 PM  
 CO2 24 01/23/2019 03:39 PM  
  
 
Review of Systems Pertinent items are noted in HPI. Objective:  
 
Significant for the following:  
 
Visit Vitals /85 (BP 1 Location: Left arm, BP Patient Position: Sitting) Pulse (!) 105 Temp 98.3 °F (36.8 °C) (Oral) Resp 16 Ht 5' 5.5\" (1.664 m) Wt 165 lb (74.8 kg) LMP 06/03/2014 SpO2 92% BMI 27.04 kg/m² Appearance: alert, well appearing, and in no distress. Exam: heart sounds normal rate, regular rhythm, normal S1, S2, no murmurs, rubs, clicks or gallops, chest clear, feet: warm, good capillary refill Foot exam: Diabetic foot exam was performed. No obvious sores or red lesions. Sensation checked by monofilament exam which was normal.  Dorsalis pedis pulse was present. Lab review: orders written for new lab studies as appropriate; see orders. Assessment/Plan:  
 
Follow-up diabetes stable. Diabetic issues reviewed with her: diabetic diet discussed in detail, written exchange diet given and patient urged in the strongest terms to quit smoking. Chronic Conditions Addressed Today 1. Hyperlipidemia Relevant Orders LIPID PANEL (Completed) IN [facility]&/OR CONVEY OF SPEC FOR TR OFFICE TO LAB  
  COLLECTION VENOUS BLOOD,VENIPUNCTURE  
 2. Type 2 diabetes mellitus with diabetic neuropathy (HCC) Relevant Medications  
  gabapentin (NEURONTIN) 600 mg tablet FLUoxetine (PROZAC) 20 mg tablet Other Relevant Orders METABOLIC PANEL, BASIC (Completed) HEMOGLOBIN A1C WITH EAG (Completed) IN [facility]&/OR CONVEY OF SPEC FOR TR OFFICE TO LAB  
  COLLECTION VENOUS BLOOD,VENIPUNCTURE  
  REFERRAL TO OPTOMETRY Acute Diagnoses Addressed Today Encounter for immunization    -  Primary Relevant Orders PNEUMOCOCCAL POLYSACCHARIDE VACCINE, 23-VALENT, ADULT OR IMMUNOSUPPRESSED PT DOSE, (Completed) IN HAND[facility]&/OR CONVEY OF SPEC FOR TR OFFICE TO LAB  
   
 COLLECTION VENOUS BLOOD,VENIPUNCTURE Chronic pain syndrome Relevant Orders IN [facility]&/OR CONVEY OF SPEC FOR TR OFFICE TO LAB  
   
 COLLECTION VENOUS BLOOD,VENIPUNCTURE Lumbago with sciatica, right side Relevant Medications  
   
 gabapentin (NEURONTIN) 600 mg tablet FLUoxetine (PROZAC) 20 mg tablet Other Relevant Orders IN HAND[facility]&/OR CONVEY OF SPEC FOR TR OFFICE TO LAB  
   
 COLLECTION VENOUS BLOOD,VENIPUNCTURE Benzodiazepine contraindicated with her narcotic agent. Restart Prozac. Back pain okay increase in her gabapentin. Pneumonia vaccine. Diabetes, elevated cholesterol stable, labs to evaluate. Discussed possible side affects, precautions, and drug interactions and possible benefits of the medication(s). Follow-up Disposition: 
Return in about 3 months (around 4/23/2019) for routine follow up.

## 2019-01-24 LAB
BUN SERPL-MCNC: 13 MG/DL (ref 6–24)
BUN/CREAT SERPL: 16 (ref 9–23)
CALCIUM SERPL-MCNC: 9.6 MG/DL (ref 8.7–10.2)
CHLORIDE SERPL-SCNC: 101 MMOL/L (ref 96–106)
CHOLEST SERPL-MCNC: 197 MG/DL (ref 100–199)
CO2 SERPL-SCNC: 24 MMOL/L (ref 20–29)
CREAT SERPL-MCNC: 0.81 MG/DL (ref 0.57–1)
EST. AVERAGE GLUCOSE BLD GHB EST-MCNC: 177 MG/DL
GLUCOSE SERPL-MCNC: 133 MG/DL (ref 65–99)
HBA1C MFR BLD: 7.8 % (ref 4.8–5.6)
HDLC SERPL-MCNC: 42 MG/DL
INTERPRETATION, 910389: NORMAL
LDLC SERPL CALC-MCNC: 107 MG/DL (ref 0–99)
Lab: NORMAL
POTASSIUM SERPL-SCNC: 4 MMOL/L (ref 3.5–5.2)
SODIUM SERPL-SCNC: 141 MMOL/L (ref 134–144)
TRIGL SERPL-MCNC: 238 MG/DL (ref 0–149)
VLDLC SERPL CALC-MCNC: 48 MG/DL (ref 5–40)

## 2019-02-08 ENCOUNTER — DOCUMENTATION ONLY (OUTPATIENT)
Dept: INTERNAL MEDICINE | Age: 54
End: 2019-02-08

## 2019-02-18 ENCOUNTER — DOCUMENTATION ONLY (OUTPATIENT)
Dept: INTERNAL MEDICINE CLINIC | Age: 54
End: 2019-02-18

## 2019-04-22 ENCOUNTER — OFFICE VISIT (OUTPATIENT)
Dept: INTERNAL MEDICINE CLINIC | Age: 54
End: 2019-04-22

## 2019-04-22 VITALS
OXYGEN SATURATION: 98 % | TEMPERATURE: 98.1 F | WEIGHT: 153.4 LBS | HEIGHT: 66 IN | BODY MASS INDEX: 24.65 KG/M2 | DIASTOLIC BLOOD PRESSURE: 84 MMHG | RESPIRATION RATE: 12 BRPM | SYSTOLIC BLOOD PRESSURE: 126 MMHG | HEART RATE: 80 BPM

## 2019-04-22 DIAGNOSIS — G47.00 INSOMNIA, UNSPECIFIED TYPE: ICD-10-CM

## 2019-04-22 DIAGNOSIS — J44.9 CHRONIC OBSTRUCTIVE PULMONARY DISEASE, UNSPECIFIED COPD TYPE (HCC): ICD-10-CM

## 2019-04-22 DIAGNOSIS — F32.A ANXIETY AND DEPRESSION: ICD-10-CM

## 2019-04-22 DIAGNOSIS — M54.41 CHRONIC RIGHT-SIDED LOW BACK PAIN WITH RIGHT-SIDED SCIATICA: ICD-10-CM

## 2019-04-22 DIAGNOSIS — G89.29 CHRONIC RIGHT-SIDED LOW BACK PAIN WITH RIGHT-SIDED SCIATICA: ICD-10-CM

## 2019-04-22 DIAGNOSIS — F41.9 ANXIETY: ICD-10-CM

## 2019-04-22 DIAGNOSIS — E11.40 TYPE 2 DIABETES MELLITUS WITH DIABETIC NEUROPATHY, WITHOUT LONG-TERM CURRENT USE OF INSULIN (HCC): Primary | ICD-10-CM

## 2019-04-22 DIAGNOSIS — G43.901 MIGRAINE WITH STATUS MIGRAINOSUS, NOT INTRACTABLE, UNSPECIFIED MIGRAINE TYPE: ICD-10-CM

## 2019-04-22 DIAGNOSIS — F41.9 ANXIETY AND DEPRESSION: ICD-10-CM

## 2019-04-22 DIAGNOSIS — E11.65 TYPE 2 DIABETES MELLITUS WITH HYPERGLYCEMIA, WITHOUT LONG-TERM CURRENT USE OF INSULIN (HCC): ICD-10-CM

## 2019-04-22 RX ORDER — BUTALBITAL, ACETAMINOPHEN AND CAFFEINE 50; 325; 40 MG/1; MG/1; MG/1
1 TABLET ORAL
Qty: 12 TAB | Refills: 1 | Status: SHIPPED | OUTPATIENT
Start: 2019-04-22 | End: 2019-07-29 | Stop reason: SDUPTHER

## 2019-04-22 RX ORDER — GABAPENTIN 600 MG/1
600 TABLET ORAL 3 TIMES DAILY
Qty: 270 TAB | Refills: 1 | Status: SHIPPED | OUTPATIENT
Start: 2019-04-22 | End: 2019-07-01

## 2019-04-22 RX ORDER — ESTRADIOL 0.5 MG/1
0.5 TABLET ORAL DAILY
Qty: 90 TAB | Refills: 1 | Status: SHIPPED | OUTPATIENT
Start: 2019-04-22 | End: 2019-06-26 | Stop reason: SDUPTHER

## 2019-04-22 RX ORDER — BUPRENORPHINE AND NALOXONE 8; 2 MG/1; MG/1
FILM, SOLUBLE BUCCAL; SUBLINGUAL
COMMUNITY

## 2019-04-22 RX ORDER — TRAZODONE HYDROCHLORIDE 100 MG/1
100 TABLET ORAL
Qty: 90 TAB | Refills: 1 | Status: SHIPPED | OUTPATIENT
Start: 2019-04-22 | End: 2020-05-15 | Stop reason: SDUPTHER

## 2019-04-22 RX ORDER — CYCLOBENZAPRINE HCL 10 MG
10 TABLET ORAL
Qty: 60 TAB | Refills: 2 | Status: SHIPPED | OUTPATIENT
Start: 2019-04-22 | End: 2020-10-01 | Stop reason: SDUPTHER

## 2019-04-22 RX ORDER — METFORMIN HYDROCHLORIDE 500 MG/1
500 TABLET ORAL 2 TIMES DAILY WITH MEALS
Qty: 180 TAB | Refills: 1 | Status: SHIPPED | OUTPATIENT
Start: 2019-04-22 | End: 2020-05-15 | Stop reason: SDUPTHER

## 2019-04-22 RX ORDER — ONDANSETRON 4 MG/1
4 TABLET, ORALLY DISINTEGRATING ORAL
COMMUNITY
Start: 2019-04-15 | End: 2019-04-22 | Stop reason: ALTCHOICE

## 2019-04-22 RX ORDER — CYCLOBENZAPRINE HCL 10 MG
TABLET ORAL
Refills: 0 | COMMUNITY
Start: 2019-03-31 | End: 2019-04-22 | Stop reason: SDUPTHER

## 2019-04-22 RX ORDER — AZITHROMYCIN 500 MG/1
TABLET, FILM COATED ORAL
Refills: 0 | COMMUNITY
Start: 2019-04-16 | End: 2019-04-22 | Stop reason: ALTCHOICE

## 2019-04-22 RX ORDER — FUROSEMIDE 20 MG/1
TABLET ORAL
Qty: 90 TAB | Refills: 1 | Status: SHIPPED | OUTPATIENT
Start: 2019-04-22 | End: 2020-10-05 | Stop reason: SDUPTHER

## 2019-04-22 NOTE — PROGRESS NOTES
Chief Complaint Patient presents with  Diabetes MEDICATION REFILL  Abdominal Pain CASE OF FOOD POISONING 1. Have you been to the ER, urgent care clinic since your last visit? Hospitalized since your last visit? Yes When: 04.15.2019 Where: [facility] ER Reason for visit: Abdominal Pain 2. Have you seen or consulted any other health care providers outside of the [address] [place] [address] since your last visit? Include any pap smears or colon screening. No 
 
Health Maintenance Due Topic Date Due  
 EYE EXAM RETINAL OR DILATED  03/04/1975  COLONOSCOPY  03/04/1983  Shingrix Vaccine Age 50> (1 of 2) 03/04/2015  MICROALBUMIN Q1  05/08/2019

## 2019-04-22 NOTE — PROGRESS NOTES
Subjective:  
 
Winter Howard is a 47 y.o. female seen for follow-up of diabetes. Her blood sugars are up she increased her metformin. She has had hypoglycemic attacks. .no Blood sugar control has been run out of metformin when inc to bid, out  X few weeks states she called us to get refills but never got through. Her last A1c was not too bad at 7.8 tries to follow diet is best that she could. Has managed to quit smoking just vapes now. Her breathing is better. She has diabetes and hypertension. Winter Howard has the additional concern of refills of medicines including her migraine. Headaches times many years, has been on trazodone takes only intermittently. Sees pain manager, Dr. Grey Toro changed her from methadone to Suboxone for her chronic pain, it helps. Diabetic Review of Systems: no hypoglycemia, has noted excessive thirstiness and frequent urination, has dysesthesias in the feet, breathing better, the usual CP comes and goes years. Allergies Allergen Reactions  Codeine Hives Diet and Lifestyle: does not rigorously follow a diabetic diet, nonsmoker, vapes. Patient Active Problem List  
 Diagnosis Date Noted  Pain, chronic 04/22/2019  Type 2 diabetes mellitus with hyperglycemia, without long-term current use of insulin ([facility] 05/08/2018  Type 2 diabetes mellitus with diabetic neuropathy ([facility] 03/14/2018  S/P laparoscopic hysterectomy 06/24/2014  Hyperlipidemia 02/04/2013  Anxiety 02/04/2013 Allergies Allergen Reactions  Codeine Hives Social History Tobacco Use  Smoking status: Former Smoker Packs/day: 1.00 Years: 30.00 Pack years: 30.00  Smokeless tobacco: Current User  Tobacco comment: Vape User Substance Use Topics  Alcohol use: No  
  
 
Lab Results Component Value Date/Time  Hemoglobin A1c 7.8 (H) 01/23/2019 03:39 PM  
 Hemoglobin A1c 7.7 (H) 03/14/2018 04:22 PM  
 Hemoglobin A1c 7.0 (H) 07/28/2016 12:10 PM  
 Glucose 133 (H) 01/23/2019 03:39 PM  
 Glucose (POC) 133 (H) 09/24/2014 04:43 PM  
 Microalb/Creat ratio (ug/mg creat.) 11.7 05/08/2018 03:46 PM  
 LDL, calculated 107 (H) 01/23/2019 03:39 PM  
 Creatinine 0.81 01/23/2019 03:39 PM  
  
Lab Results Component Value Date/Time ALT (SGPT) 68 (H) 03/15/2018 12:00 AM  
 AST (SGOT) 47 (H) 03/15/2018 12:00 AM  
 Alk. phosphatase 95 03/15/2018 12:00 AM  
 Bilirubin, direct 0.06 03/15/2018 12:00 AM  
 Bilirubin, total 0.2 03/15/2018 12:00 AM  
 Albumin 4.5 03/15/2018 12:00 AM  
 Protein, total 7.4 03/15/2018 12:00 AM  
 PLATELET 618 (L) 82/38/9690 12:00 AM  
 
Lab Results Component Value Date/Time GFR est non-AA 83 01/23/2019 03:39 PM  
 GFR est AA 96 01/23/2019 03:39 PM  
 Creatinine 0.81 01/23/2019 03:39 PM  
 BUN 13 01/23/2019 03:39 PM  
 Sodium 141 01/23/2019 03:39 PM  
 Potassium 4.0 01/23/2019 03:39 PM  
 Chloride 101 01/23/2019 03:39 PM  
 CO2 24 01/23/2019 03:39 PM  
 
Lab Results Component Value Date/Time Glucose 133 (H) 01/23/2019 03:39 PM  
 Glucose (POC) 133 (H) 09/24/2014 04:43 PM  
   
 
Review of Systems Pertinent items are noted in HPI. Objective:  
 
Significant for the following: Weight down 12 pounds from January Visit Vitals /84 (BP 1 Location: Left arm, BP Patient Position: Sitting) Pulse 80 Temp 98.1 °F (36.7 °C) (Oral) Resp 12 Ht 5' 5.5\" (1.664 m) Wt 153 lb 6.4 oz (69.6 kg) LMP 06/03/2014 SpO2 98% BMI 25.14 kg/m² Appearance: alert, well appearing, and in no distress. Exam: heart sounds normal rate, regular rhythm, normal S1, S2, no murmurs, rubs, clicks or gallops, chest clear Foot exam: Deferred Lab review: labs reviewed, I note that glycosylated hemoglobin mildly abnormal but acceptable. Assessment/Plan:  
 
Follow-up diabetes borderline controlled.  
Diabetic issues reviewed with her: all medications, side effects and compliance discussed carefully, annual eye examinations at Ophthalmology discussed and glycohemoglobin and other lab monitoring discussed. Chronic Conditions Addressed Today 1. Type 2 diabetes mellitus with hyperglycemia, without long-term current use of insulin ([facility]) Relevant Medications  
  metFORMIN (GLUCOPHAGE) 500 mg tablet  
  gabapentin (NEURONTIN) 600 mg tablet 2. Type 2 diabetes mellitus with diabetic neuropathy (HCC) - Primary Relevant Medications  
  metFORMIN (GLUCOPHAGE) 500 mg tablet  
  butalbital-acetaminophen-caffeine (FIORICET, ESGIC) -40 mg per tablet  
  traZODone (DESYREL) 100 mg tablet  
  cyclobenzaprine (FLEXERIL) 10 mg tablet  
  gabapentin (NEURONTIN) 600 mg tablet Other Relevant Orders REFERRAL TO OPTOMETRY 3. Anxiety Relevant Medications  
  buprenorphine-naloxone (SUBOXONE) 8-2 mg film sublingaul film  
  butalbital-acetaminophen-caffeine (FIORICET, ESGIC) -40 mg per tablet  
  traZODone (DESYREL) 100 mg tablet  
  gabapentin (NEURONTIN) 600 mg tablet Acute Diagnoses Addressed Today Insomnia, unspecified type Relevant Medications  
   
 traZODone (DESYREL) 100 mg tablet Anxiety and depression Relevant Medications  
   
 buprenorphine-naloxone (SUBOXONE) 8-2 mg film sublingaul film  
   
 butalbital-acetaminophen-caffeine (FIORICET, ESGIC) -40 mg per tablet  
   
 traZODone (DESYREL) 100 mg tablet  
   
 gabapentin (NEURONTIN) 600 mg tablet Migraine with status migrainosus, not intractable, unspecified migraine type Relevant Medications  
   
 butalbital-acetaminophen-caffeine (FIORICET, ESGIC) -40 mg per tablet  
   
 traZODone (DESYREL) 100 mg tablet  
   
 cyclobenzaprine (FLEXERIL) 10 mg tablet  
   
 gabapentin (NEURONTIN) 600 mg tablet Chronic obstructive pulmonary disease, unspecified COPD type ([facility] Chronic right-sided low back pain with right-sided sciatica Relevant Medications  
   
 butalbital-acetaminophen-caffeine (FIORICET, ESGIC) -40 mg per tablet  
   
 traZODone (DESYREL) 100 mg tablet  
   
 cyclobenzaprine (FLEXERIL) 10 mg tablet  
   
 gabapentin (NEURONTIN) 600 mg tablet  
   
 estradiol (ESTRACE) 0.5 mg tablet Restart metformin as above Okay Fioricet, recommend take trazodone nightly for migraine prophylaxis. Depression anxiety, wants to stay off antidepressant, stable Congratulated her on quitting smoking and some weight loss. Current Outpatient Medications Medication Sig Dispense Refill  buprenorphine-naloxone (SUBOXONE) 8-2 mg film sublingaul film by SubLINGual route.  metFORMIN (GLUCOPHAGE) 500 mg tablet Take 1 Tab by mouth two (2) times daily (with meals). 180 Tab 1  
 butalbital-acetaminophen-caffeine (FIORICET, ESGIC) -40 mg per tablet Take 1 Tab by mouth every six (6) hours as needed for Pain. 12 Tab 1  
 traZODone (DESYREL) 100 mg tablet Take 1 Tab by mouth nightly. 90 Tab 1  cyclobenzaprine (FLEXERIL) 10 mg tablet Take 1 Tab by mouth three (3) times daily as needed for Muscle Spasm(s). 60 Tab 2  
 gabapentin (NEURONTIN) 600 mg tablet Take 1 Tab by mouth three (3) times daily. 270 Tab 1  
 furosemide (LASIX) 20 mg tablet Daily as needed for swelling 90 Tab 1  
 estradiol (ESTRACE) 0.5 mg tablet Take 1 Tab by mouth daily. 90 Tab 1  
 albuterol (PROVENTIL HFA, VENTOLIN HFA, PROAIR HFA) 90 mcg/actuation inhaler Take 1 Puff by inhalation every four (4) hours as needed for Wheezing. 1 Inhaler 5  
 meloxicam (MOBIC) 7.5 mg tablet Take 1 Tab by mouth daily. Indications: OSTEOARTHRITIS 90 Tab 0  
 albuterol (PROVENTIL VENTOLIN) 2.5 mg /3 mL (0.083 %) nebulizer solution 3 mL by Nebulization route every four (4) hours as needed for Wheezing. 24 Each 0 Follow-up and Dispositions · Return in about 6 weeks (around 6/3/2019) for routine follow up.

## 2019-04-22 NOTE — PATIENT INSTRUCTIONS
Learning About Meal Planning for Diabetes Why plan your meals? Meal planning can be a key part of managing diabetes. Planning meals and snacks with the right balance of carbohydrate, protein, and fat can help you keep your blood sugar at the target level you set with your doctor. You don't have to eat special foods. You can eat what your family eats, including sweets once in a while. But you do have to pay attention to how often you eat and how much you eat of certain foods. You may want to work with a dietitian or a certified diabetes educator. He or she can give you tips and meal ideas and can answer your questions about meal planning. This health professional can also help you reach a healthy weight if that is one of your goals. What plan is right for you? Your dietitian or diabetes educator may suggest that you start with the plate format or carbohydrate counting. The plate format The plate format is a simple way to help you manage how you eat. You plan meals by learning how much space each food should take on a plate. Using the plate format helps you spread carbohydrate throughout the day. It can make it easier to keep your blood sugar level within your target range. It also helps you see if you're eating healthy portion sizes. To use the plate format, you put non-starchy vegetables on half your plate. Add meat or meat substitutes on one-quarter of the plate. Put a grain or starchy vegetable (such as brown rice or a potato) on the final quarter of the plate. You can add a small piece of fruit and some low-fat or fat-free milk or yogurt, depending on your carbohydrate goal for each meal. 
Here are some tips for using the plate format: · Make sure that you are not using an oversized plate. A 9-inch plate is best. Many restaurants use larger plates. · Get used to using the plate format at home. Then you can use it when you eat out. · Write down your questions about using the plate format. Talk to your doctor, a dietitian, or a diabetes educator about your concerns. Carbohydrate counting With carbohydrate counting, you plan meals based on the amount of carbohydrate in each food. Carbohydrate raises blood sugar higher and more quickly than any other nutrient. It is found in desserts, breads and cereals, and fruit. It's also found in starchy vegetables such as potatoes and corn, grains such as rice and pasta, and milk and yogurt. Spreading carbohydrate throughout the day helps keep your blood sugar levels within your target range. Your daily amount depends on several things, including your weight, how active you are, which diabetes medicines you take, and what your goals are for your blood sugar levels. A registered dietitian or diabetes educator can help you plan how much carbohydrate to include in each meal and snack. A guideline for your daily amount of carbohydrate is: · 45 to 60 grams at each meal. That's about the same as 3 to 4 carbohydrate servings. · 15 to 20 grams at each snack. That's about the same as 1 carbohydrate serving. The Nutrition Facts label on packaged foods tells you how much carbohydrate is in a serving of the food. First, look at the serving size on the food label. Is that the amount you eat in a serving? All of the nutrition information on a food label is based on that serving size. So if you eat more or less than that, you'll need to adjust the other numbers. Total carbohydrate is the next thing you need to look for on the label. If you count carbohydrate servings, one serving of carbohydrate is 15 grams. For foods that don't come with labels, such as fresh fruits and vegetables, you'll need a guide that lists carbohydrate in these foods. Ask your doctor, dietitian, or diabetes educator about books or other nutrition guides you can use.  
If you take insulin, you need to know how many grams of carbohydrate are in a meal. This lets you know how much rapid-acting insulin to take before you eat. If you use an insulin pump, you get a constant rate of insulin during the day. So the pump must be programmed at meals to give you extra insulin to cover the rise in blood sugar after meals. When you know how much carbohydrate you will eat, you can take the right amount of insulin. Or, if you always use the same amount of insulin, you need to make sure that you eat the same amount of carbohydrate at meals. If you need more help to understand carbohydrate counting and food labels, ask your doctor, dietitian, or diabetes educator. How do you get started with meal planning? Here are some tips to get started: 
· Plan your meals a week at a time. Don't forget to include snacks too. · Use cookbooks or online recipes to plan several main meals. Plan some quick meals for busy nights. You also can double some recipes that freeze well. Then you can save half for other busy nights when you don't have time to cook. · Make sure you have the ingredients you need for your recipes. If you're running low on basic items, put these items on your shopping list too. · List foods that you use to make breakfasts, lunches, and snacks. List plenty of fruits and vegetables. · Post this list on the refrigerator. Add to it as you think of more things you need. · Take the list to the store to do your weekly shopping. Follow-up care is a key part of your treatment and safety. Be sure to make and go to all appointments, and call your doctor if you are having problems. It's also a good idea to know your test results and keep a list of the medicines you take. Where can you learn more? Go to http://sea-clark.info/. Jacquelyn Lynn in the search box to learn more about \"Learning About Meal Planning for Diabetes. \" Current as of: July 25, 2018 Content Version: 11.9 © 4392-5627 [employer].  Care instructions adapted under license by [facility] [address] (which disclaims liability or warranty for this information). If you have questions about a medical condition or this instruction, always ask your healthcare professional. [address] any warranty or liability for your use of this information. Learning About Diabetes Food Guidelines Your Care Instructions Meal planning is important to manage diabetes. It helps keep your blood sugar at a target level (which you set with your doctor). You don't have to eat special foods. You can eat what your family eats, including sweets once in a while. But you do have to pay attention to how often you eat and how much you eat of certain foods. You may want to work with a dietitian or a certified diabetes educator (CDE) to help you plan meals and snacks. A dietitian or CDE can also help you lose weight if that is one of your goals. What should you know about eating carbs? Managing the amount of carbohydrate (carbs) you eat is an important part of healthy meals when you have diabetes. Carbohydrate is found in many foods. · Learn which foods have carbs. And learn the amounts of carbs in different foods. ? Bread, cereal, pasta, and rice have about 15 grams of carbs in a serving. A serving is 1 slice of bread (1 ounce), ½ cup of cooked cereal, or 1/3 cup of cooked pasta or rice. ? Fruits have 15 grams of carbs in a serving. A serving is 1 small fresh fruit, such as an apple or orange; ½ of a banana; ½ cup of cooked or canned fruit; ½ cup of fruit juice; 1 cup of melon or raspberries; or 2 tablespoons of dried fruit. ? Milk and no-sugar-added yogurt have 15 grams of carbs in a serving. A serving is 1 cup of milk or 2/3 cup of no-sugar-added yogurt. ? Starchy vegetables have 15 grams of carbs in a serving.  A serving is ½ cup of mashed potatoes or sweet potato; 1 cup winter squash; ½ of a small baked potato; ½ cup of cooked beans; or ½ cup cooked corn or green peas. · Learn how much carbs to eat each day and at each meal. A dietitian or CDE can teach you how to keep track of the amount of carbs you eat. This is called carbohydrate counting. · If you are not sure how to count carbohydrate grams, use the Plate Method to plan meals. It is a good, quick way to make sure that you have a balanced meal. It also helps you spread carbs throughout the day. ? Divide your plate by types of foods. Put non-starchy vegetables on half the plate, meat or other protein food on one-quarter of the plate, and a grain or starchy vegetable in the final quarter of the plate. To this you can add a small piece of fruit and 1 cup of milk or yogurt, depending on how many carbs you are supposed to eat at a meal. 
· Try to eat about the same amount of carbs at each meal. Do not \"save up\" your daily allowance of carbs to eat at one meal. 
· Proteins have very little or no carbs per serving. Examples of proteins are beef, chicken, turkey, fish, eggs, tofu, cheese, cottage cheese, and peanut butter. A serving size of meat is 3 ounces, which is about the size of a deck of cards. Examples of meat substitute serving sizes (equal to 1 ounce of meat) are 1/4 cup of cottage cheese, 1 egg, 1 tablespoon of peanut butter, and ½ cup of tofu. How can you eat out and still eat healthy? · Learn to estimate the serving sizes of foods that have carbohydrate. If you measure food at home, it will be easier to estimate the amount in a serving of restaurant food. · If the meal you order has too much carbohydrate (such as potatoes, corn, or baked beans), ask to have a low-carbohydrate food instead. Ask for a salad or green vegetables. · If you use insulin, check your blood sugar before and after eating out to help you plan how much to eat in the future.  
· If you eat more carbohydrate at a meal than you had planned, take a walk or do other exercise. This will help lower your blood sugar. What else should you know? · Limit saturated fat, such as the fat from meat and dairy products. This is a healthy choice because people who have diabetes are at higher risk of heart disease. So choose lean cuts of meat and nonfat or low-fat dairy products. Use olive or canola oil instead of butter or shortening when cooking. · Don't skip meals. Your blood sugar may drop too low if you skip meals and take insulin or certain medicines for diabetes. · Check with your doctor before you drink alcohol. Alcohol can cause your blood sugar to drop too low. Alcohol can also cause a bad reaction if you take certain diabetes medicines. Follow-up care is a key part of your treatment and safety. Be sure to make and go to all appointments, and call your doctor if you are having problems. It's also a good idea to know your test results and keep a list of the medicines you take. Where can you learn more? Go to http://sea-clark.info/. Enter T339 in the search box to learn more about \"Learning About Diabetes Food Guidelines. \" Current as of: July 25, 2018 Content Version: 11.9 © 3741-1852 [employer]. Care instructions adapted under license by [employer] (which disclaims liability or warranty for this information). If you have questions about a medical condition or this instruction, always ask your healthcare professional. [address] any warranty or liability for your use of this information.

## 2019-06-28 NOTE — TELEPHONE ENCOUNTER
Requested Prescriptions     Pending Prescriptions Disp Refills    estradiol (ESTRACE) 0.5 mg tablet 90 Tab 1     Sig: Take 1 Tab by mouth daily.      Future Appointments:  7/1/2019  1:45 PM Ana Abernathy MD   Last Appointment With Me:  4/22/2019

## 2019-06-29 RX ORDER — ESTRADIOL 0.5 MG/1
0.5 TABLET ORAL DAILY
Qty: 90 TAB | Refills: 1 | Status: SHIPPED | OUTPATIENT
Start: 2019-06-29 | End: 2020-02-08

## 2019-07-01 ENCOUNTER — OFFICE VISIT (OUTPATIENT)
Dept: INTERNAL MEDICINE CLINIC | Age: 54
End: 2019-07-01

## 2019-07-01 VITALS
TEMPERATURE: 97.8 F | HEIGHT: 66 IN | BODY MASS INDEX: 23.46 KG/M2 | WEIGHT: 146 LBS | DIASTOLIC BLOOD PRESSURE: 95 MMHG | SYSTOLIC BLOOD PRESSURE: 166 MMHG | OXYGEN SATURATION: 95 % | HEART RATE: 95 BPM | RESPIRATION RATE: 16 BRPM

## 2019-07-01 DIAGNOSIS — E11.40 TYPE 2 DIABETES MELLITUS WITH DIABETIC NEUROPATHY, WITHOUT LONG-TERM CURRENT USE OF INSULIN (HCC): ICD-10-CM

## 2019-07-01 DIAGNOSIS — J18.9 COMMUNITY ACQUIRED PNEUMONIA OF RIGHT LOWER LOBE OF LUNG: ICD-10-CM

## 2019-07-01 DIAGNOSIS — E11.40 NEUROPATHY DUE TO TYPE 2 DIABETES MELLITUS (HCC): Primary | ICD-10-CM

## 2019-07-01 DIAGNOSIS — F41.0 PANIC DISORDER: ICD-10-CM

## 2019-07-01 RX ORDER — ALBUTEROL SULFATE 90 UG/1
1 AEROSOL, METERED RESPIRATORY (INHALATION)
Qty: 1 INHALER | Refills: 5 | Status: SHIPPED | OUTPATIENT
Start: 2019-07-01 | End: 2019-11-04 | Stop reason: SDUPTHER

## 2019-07-01 RX ORDER — GABAPENTIN 800 MG/1
800 TABLET ORAL 3 TIMES DAILY
Qty: 90 TAB | Refills: 5 | Status: SHIPPED | OUTPATIENT
Start: 2019-07-01 | End: 2020-01-02 | Stop reason: SDUPTHER

## 2019-07-01 RX ORDER — AZITHROMYCIN 250 MG/1
250 TABLET, FILM COATED ORAL SEE ADMIN INSTRUCTIONS
Qty: 6 TAB | Refills: 0 | Status: SHIPPED | OUTPATIENT
Start: 2019-07-01 | End: 2019-07-29 | Stop reason: ALTCHOICE

## 2019-07-01 RX ORDER — CITALOPRAM 10 MG/1
10 TABLET ORAL DAILY
Qty: 30 TAB | Refills: 2 | Status: SHIPPED | OUTPATIENT
Start: 2019-07-01 | End: 2019-07-29 | Stop reason: SINTOL

## 2019-07-01 NOTE — PROGRESS NOTES
Chief Complaint   Patient presents with    Cold Symptoms     prod cough thick green sputum, fever, chills, body aches     I have reviewed the patient's medical history in detail and updated the computerized patient record. Health Maintenance reviewed. 1. Have you been to the ER, urgent care clinic since your last visit? Hospitalized since your last visit?no    2. Have you seen or consulted any other health care providers outside of the [address] [place] [address] Matthew since your last visit? Include any pap smears or colon screening. No      Encouraged pt to discuss pt's wishes with spouse/partner/family and bring them in the next appt to follow thru with the Advanced Directive    Fall Risk Assessment, last 12 mths 1/23/2019   Able to walk? Yes   Fall in past 12 months? No       3 most recent PHQ Screens 7/1/2019   Little interest or pleasure in doing things Several days   Feeling down, depressed, irritable, or hopeless Several days   Total Score PHQ 2 2       Abuse Screening Questionnaire 1/23/2019   Do you ever feel afraid of your partner? N   Are you in a relationship with someone who physically or mentally threatens you? N   Is it safe for you to go home?  Y       ADL Assessment 1/23/2019   Feeding yourself No Help Needed   Getting from bed to chair No Help Needed   Getting dressed No Help Needed   Bathing or showering No Help Needed   Walk across the room (includes cane/walker) No Help Needed   Using the telphone No Help Needed   Taking your medications No Help Needed   Preparing meals No Help Needed   Managing money (expenses/bills) No Help Needed   Moderately strenuous housework (laundry) No Help Needed   Shopping for personal items (toiletries/medicines) No Help Needed   Shopping for groceries No Help Needed   Driving No Help Needed   Climbing a flight of stairs No Help Needed   Getting to places beyond walking distances No Help Needed

## 2019-07-01 NOTE — PROGRESS NOTES
Subjective:   Nahed Bettencourt is a 47 y.o. female who complains of sore throat, cough described as productive of creamy sputum, headache, bilateral sinus pain, chills, suspected fevers but not measured at home and wheezing for 7 days, gradually worsening since that time. She denies a history of rash on body, vomiting. Had bad PA the other day with CP and MI thoughts. Evaluation to date: none. Treatment to date: antibiotics -amox finished for tooth. Began taking 2 weeks ago. .  Patient does not (but vapes) smoke cigarettes. Relevant PMH: DM and COPD. No recnt F/u 4 that  Pain in legs and feet worse lately    Allergies   Allergen Reactions    Codeine Hives         Patient Active Problem List    Diagnosis Date Noted    Pain, chronic 04/22/2019    Type 2 diabetes mellitus with hyperglycemia, without long-term current use of insulin ([address] 05/08/2018    Type 2 diabetes mellitus with diabetic neuropathy ([facility] [address] 03/14/2018    S/P laparoscopic hysterectomy 06/24/2014    Hyperlipidemia 02/04/2013    Anxiety 02/04/2013     Allergies   Allergen Reactions    Codeine Hives     Past Medical History:   Diagnosis Date    Back pain     Degenerative disc disease     Diabetes ([address]     ; glucose runs 200-300 at home    GERD (gastroesophageal reflux disease)     none recently, takes nexium prn only    Hypercholesteremia     Hypertension     Menopause     Psychiatric disorder     Anxiety; did not take xanax this am     Social History     Tobacco Use    Smoking status: Current Every Day Smoker     Packs/day: 1.00     Years: 30.00     Pack years: 30.00    Smokeless tobacco: Current User    Tobacco comment: Vape User   Substance Use Topics    Alcohol use: No        Review of Systems  Pertinent items are noted in HPI.     Objective:     Visit Vitals  BP (!) 166/95   Pulse 95   Temp 97.8 °F (36.6 °C) (Oral)   Resp 16   Ht 5' 5.5\" (1.664 m)   Wt 146 lb (66.2 kg)   LMP 06/03/2014   SpO2 95%   BMI 23.93 kg/m² General:  alert, fatigued, cooperative, distracted, no distress   Eyes: negative   Ears: normal TM's and external ear canals AU   Sinuses: Normal paranasal sinuses without tenderness   Mouth:  Lips, mucosa, and tongue normal. Teeth and gums normal   Neck: supple, symmetrical, trachea midline and no adenopathy. Heart: S1 and S2 normal, no murmurs noted. Lungs: rhonchi R base   Abdomen: soft, non-tender. Bowel sounds normal. No masses,  no organomegaly      Assessment/Plan:   pneumonia  Antibiotics per orders. Encounter Diagnoses   Name Primary?  Neuropathy due to type 2 diabetes mellitus ([facility] [address] Yes    Type 2 diabetes mellitus with diabetic neuropathy, without long-term current use of insulin ([facility] [address]     Community acquired pneumonia of right lower lobe of lung ([facility] [address]     Panic disorder      Orders Placed This Encounter    gabapentin (NEURONTIN) 800 mg tablet    azithromycin (ZITHROMAX) 250 mg tablet    albuterol (PROVENTIL HFA, VENTOLIN HFA, PROAIR HFA) 90 mcg/actuation inhaler    citalopram (CELEXA) 10 mg tablet   . Discussed possible side affects, precautions, and drug interactions and possible benefits of the medication(s). pneumnia AB as above. Further w/u of DM when she f/u  [facility] gabapentin   Follow-up and Dispositions    · Return in about 2 weeks (around 7/15/2019) for routine follow up.

## 2019-07-08 ENCOUNTER — TELEPHONE (OUTPATIENT)
Dept: INTERNAL MEDICINE CLINIC | Age: 54
End: 2019-07-08

## 2019-07-08 NOTE — TELEPHONE ENCOUNTER
VORB for Gabapentin 800 mg per Dr. Bekah Pierce with [facility], pharmacist.  Understanding verbalized.  verified. Patient notified of the Rx phoned into the pharmacy per Dr. Bekah Pierce. Patient verbalized understanding.

## 2019-07-29 ENCOUNTER — OFFICE VISIT (OUTPATIENT)
Dept: INTERNAL MEDICINE CLINIC | Age: 54
End: 2019-07-29

## 2019-07-29 VITALS
HEART RATE: 89 BPM | WEIGHT: 146 LBS | SYSTOLIC BLOOD PRESSURE: 115 MMHG | BODY MASS INDEX: 23.46 KG/M2 | TEMPERATURE: 98.3 F | DIASTOLIC BLOOD PRESSURE: 71 MMHG | RESPIRATION RATE: 16 BRPM | HEIGHT: 66 IN | OXYGEN SATURATION: 97 %

## 2019-07-29 DIAGNOSIS — G89.21 CHRONIC PAIN DUE TO TRAUMA: ICD-10-CM

## 2019-07-29 DIAGNOSIS — G43.901 MIGRAINE WITH STATUS MIGRAINOSUS, NOT INTRACTABLE, UNSPECIFIED MIGRAINE TYPE: ICD-10-CM

## 2019-07-29 DIAGNOSIS — R13.19 ESOPHAGEAL DYSPHAGIA: Primary | ICD-10-CM

## 2019-07-29 DIAGNOSIS — K63.5 POLYP OF TRANSVERSE COLON, UNSPECIFIED TYPE: ICD-10-CM

## 2019-07-29 DIAGNOSIS — E11.65 TYPE 2 DIABETES MELLITUS WITH HYPERGLYCEMIA, WITHOUT LONG-TERM CURRENT USE OF INSULIN (HCC): ICD-10-CM

## 2019-07-29 DIAGNOSIS — E11.40 TYPE 2 DIABETES MELLITUS WITH DIABETIC NEUROPATHY, WITHOUT LONG-TERM CURRENT USE OF INSULIN (HCC): ICD-10-CM

## 2019-07-29 PROBLEM — K76.0 FATTY INFILTRATION OF LIVER: Status: ACTIVE | Noted: 2019-07-29

## 2019-07-29 LAB
BILIRUB UR QL STRIP: NORMAL
GLUCOSE UR-MCNC: NEGATIVE MG/DL
KETONES P FAST UR STRIP-MCNC: NEGATIVE MG/DL
PH UR STRIP: 5.5 [PH] (ref 4.6–8)
PROT UR QL STRIP: NEGATIVE
SP GR UR STRIP: 1.03 (ref 1–1.03)
UA UROBILINOGEN AMB POC: NORMAL (ref 0.2–1)
URINALYSIS CLARITY POC: NORMAL
URINALYSIS COLOR POC: NORMAL
URINE BLOOD POC: NEGATIVE
URINE LEUKOCYTES POC: NEGATIVE
URINE NITRITES POC: NEGATIVE

## 2019-07-29 RX ORDER — BUTALBITAL, ACETAMINOPHEN AND CAFFEINE 50; 325; 40 MG/1; MG/1; MG/1
1 TABLET ORAL
Qty: 12 TAB | Refills: 1 | Status: SHIPPED | OUTPATIENT
Start: 2019-07-29 | End: 2021-04-01 | Stop reason: SDUPTHER

## 2019-07-29 NOTE — PROGRESS NOTES
Chief Complaint   Patient presents with    Dysphagia     pt feels like there is a blockage in throat after eating, pain then goes away     I have reviewed the patient's medical history in detail and updated the computerized patient record. Health Maintenance reviewed. 1. Have you been to the ER, urgent care clinic since your last visit? Hospitalized since your last visit?no    2. Have you seen or consulted any other health care providers outside of the [address] [place] [address] [place] since your last visit? Include any pap smears or colon screening. No    Encouraged pt to discuss pt's wishes with spouse/partner/family and bring them in the next appt to follow thru with the Advanced Directive    Fall Risk Assessment, last 12 mths 1/23/2019   Able to walk? Yes   Fall in past 12 months? No       3 most recent PHQ Screens 7/29/2019   Little interest or pleasure in doing things Several days   Feeling down, depressed, irritable, or hopeless Several days   Total Score PHQ 2 2       Abuse Screening Questionnaire 1/23/2019   Do you ever feel afraid of your partner? N   Are you in a relationship with someone who physically or mentally threatens you? N   Is it safe for you to go home?  Y       ADL Assessment 1/23/2019   Feeding yourself No Help Needed   Getting from bed to chair No Help Needed   Getting dressed No Help Needed   Bathing or showering No Help Needed   Walk across the room (includes cane/walker) No Help Needed   Using the telphone No Help Needed   Taking your medications No Help Needed   Preparing meals No Help Needed   Managing money (expenses/bills) No Help Needed   Moderately strenuous housework (laundry) No Help Needed   Shopping for personal items (toiletries/medicines) No Help Needed   Shopping for groceries No Help Needed   Driving No Help Needed   Climbing a flight of stairs No Help Needed   Getting to places beyond walking distances No Help Needed

## 2019-07-29 NOTE — PATIENT INSTRUCTIONS
Counting Carbohydrates: Care Instructions  Your Care Instructions    You don't have to eat special foods when you have diabetes. You just have to be careful to eat healthy foods. Carbohydrates (carbs) raise blood sugar higher and quicker than any other nutrient. Carbs are found in desserts, breads and cereals, and fruit. They're also in starchy vegetables. These include potatoes, corn, and grains such as rice and pasta. Carbs are also in milk and yogurt. The more carbs you eat at one time, the higher your blood sugar will rise. Spreading carbs all through the day helps keep your blood sugar levels within your target range. Counting carbs is one of the best ways to keep your blood sugar under control. If you use insulin, counting carbs helps you match the right amount of insulin to the number of grams of carbs in a meal. Then you can change your diet and insulin dose as needed. Testing your blood sugar several times a day can help you learn how carbs affect your blood sugar. A registered dietitian or certified diabetes educator can help you plan meals and snacks. Follow-up care is a key part of your treatment and safety. Be sure to make and go to all appointments, and call your doctor if you are having problems. It's also a good idea to know your test results and keep a list of the medicines you take. How can you care for yourself at home? Know your daily amount of carbohydrates  Your daily amount depends on several things, such as your weight, how active you are, which diabetes medicines you take, and what your goals are for your blood sugar levels. A registered dietitian or certified diabetes educator can help you plan how many carbs to include in each meal and snack. For most adults, a guideline for the daily amount of carbs is:  · 45 to 60 grams at each meal. That's about the same as 3 to 4 carbohydrate servings. · 15 to 20 grams at each snack. That's about the same as 1 carbohydrate serving.   Count carbs  Counting carbs lets you know how much rapid-acting insulin to take before you eat. If you use an insulin pump, you get a constant rate of insulin during the day. So the pump must be programmed at meals. This gives you extra insulin to cover the rise in blood sugar after meals. If you take insulin:  · Learn your own insulin-to-carb ratio. You and your diabetes health professional will figure out the ratio. You can do this by testing your blood sugar after meals. For example, you may need a certain amount of insulin for every 15 grams of carbs. · Add up the carb grams in a meal. Then you can figure out how many units of insulin to take based on your insulin-to-carb ratio. · Exercise lowers blood sugar. You can use less insulin than you would if you were not doing exercise. Keep in mind that timing matters. If you exercise within 1 hour after a meal, your body may need less insulin for that meal than it would if you exercised 3 hours after the meal. Test your blood sugar to find out how exercise affects your need for insulin. If you do or don't take insulin:  · Look at labels on packaged foods. This can tell you how many carbs are in a serving. You can also use guides from the American Diabetes Association. · Be aware of portions, or serving sizes. If a package has two servings and you eat the whole package, you need to double the number of grams of carbohydrate listed for one serving. · Protein, fat, and fiber do not raise blood sugar as much as carbs do. If you eat a lot of these nutrients in a meal, your blood sugar will rise more slowly than it would otherwise. Eat from all food groups  · Eat at least three meals a day. · Plan meals to include food from all the food groups. The food groups include grains, fruits, dairy, proteins, and vegetables. · Talk to your dietitian or diabetes educator about ways to add limited amounts of sweets into your meal plan. · If you drink alcohol, talk to your doctor. It may not be recommended when you are taking certain diabetes medicines. Where can you learn more? Go to http://sea-clark.info/. Enter U726 in the search box to learn more about \"Counting Carbohydrates: Care Instructions. \"  Current as of: July 25, 2018  Content Version: 12.1  © 5807-3330 [employer]. Care instructions adapted under license by [employer] (which disclaims liability or warranty for this information). If you have questions about a medical condition or this instruction, always ask your healthcare professional. [address] any warranty or liability for your use of this information. Learning About Diabetes Food Guidelines  Your Care Instructions    Meal planning is important to manage diabetes. It helps keep your blood sugar at a target level (which you set with your doctor). You don't have to eat special foods. You can eat what your family eats, including sweets once in a while. But you do have to pay attention to how often you eat and how much you eat of certain foods. You may want to work with a dietitian or a certified diabetes educator (CDE) to help you plan meals and snacks. A dietitian or CDE can also help you lose weight if that is one of your goals. What should you know about eating carbs? Managing the amount of carbohydrate (carbs) you eat is an important part of healthy meals when you have diabetes. Carbohydrate is found in many foods. · Learn which foods have carbs. And learn the amounts of carbs in different foods. ? Bread, cereal, pasta, and rice have about 15 grams of carbs in a serving. A serving is 1 slice of bread (1 ounce), ½ cup of cooked cereal, or 1/3 cup of cooked pasta or rice. ? Fruits have 15 grams of carbs in a serving.  A serving is 1 small fresh fruit, such as an apple or orange; ½ of a banana; ½ cup of cooked or canned fruit; ½ cup of fruit juice; 1 cup of melon or raspberries; or 2 tablespoons of dried fruit. ? Milk and no-sugar-added yogurt have 15 grams of carbs in a serving. A serving is 1 cup of milk or 2/3 cup of no-sugar-added yogurt. ? Starchy vegetables have 15 grams of carbs in a serving. A serving is ½ cup of mashed potatoes or sweet potato; 1 cup winter squash; ½ of a small baked potato; ½ cup of cooked beans; or ½ cup cooked corn or green peas. · Learn how much carbs to eat each day and at each meal. A dietitian or CDE can teach you how to keep track of the amount of carbs you eat. This is called carbohydrate counting. · If you are not sure how to count carbohydrate grams, use the Plate Method to plan meals. It is a good, quick way to make sure that you have a balanced meal. It also helps you spread carbs throughout the day. ? Divide your plate by types of foods. Put non-starchy vegetables on half the plate, meat or other protein food on one-quarter of the plate, and a grain or starchy vegetable in the final quarter of the plate. To this you can add a small piece of fruit and 1 cup of milk or yogurt, depending on how many carbs you are supposed to eat at a meal.  · Try to eat about the same amount of carbs at each meal. Do not \"save up\" your daily allowance of carbs to eat at one meal.  · Proteins have very little or no carbs per serving. Examples of proteins are beef, chicken, turkey, fish, eggs, tofu, cheese, cottage cheese, and peanut butter. A serving size of meat is 3 ounces, which is about the size of a deck of cards. Examples of meat substitute serving sizes (equal to 1 ounce of meat) are 1/4 cup of cottage cheese, 1 egg, 1 tablespoon of peanut butter, and ½ cup of tofu. How can you eat out and still eat healthy? · Learn to estimate the serving sizes of foods that have carbohydrate. If you measure food at home, it will be easier to estimate the amount in a serving of restaurant food.   · If the meal you order has too much carbohydrate (such as potatoes, corn, or baked beans), ask to have a low-carbohydrate food instead. Ask for a salad or green vegetables. · If you use insulin, check your blood sugar before and after eating out to help you plan how much to eat in the future. · If you eat more carbohydrate at a meal than you had planned, take a walk or do other exercise. This will help lower your blood sugar. What else should you know? · Limit saturated fat, such as the fat from meat and dairy products. This is a healthy choice because people who have diabetes are at higher risk of heart disease. So choose lean cuts of meat and nonfat or low-fat dairy products. Use olive or canola oil instead of butter or shortening when cooking. · Don't skip meals. Your blood sugar may drop too low if you skip meals and take insulin or certain medicines for diabetes. · Check with your doctor before you drink alcohol. Alcohol can cause your blood sugar to drop too low. Alcohol can also cause a bad reaction if you take certain diabetes medicines. Follow-up care is a key part of your treatment and safety. Be sure to make and go to all appointments, and call your doctor if you are having problems. It's also a good idea to know your test results and keep a list of the medicines you take. Where can you learn more? Go to http://sea-clark.info/. Enter A935 in the search box to learn more about \"Learning About Diabetes Food Guidelines. \"  Current as of: July 25, 2018  Content Version: 12.1  © 3357-4941 [employer]. Care instructions adapted under license by [employer] (which disclaims liability or warranty for this information). If you have questions about a medical condition or this instruction, always ask your healthcare professional. [place] [address] any warranty or liability for your use of this information.

## 2019-07-29 NOTE — PROGRESS NOTES
Subjective:     Edis Walker is a 47 y.o. female seen for follow-up of diabetes. She has had hypoglycemic attacks. .not lately  Blood sugar control has been ok was 7.8  She has diabetes. Re rf of HA meds    Edis Walker has the additional concern of trouble swallowing for a week. , occa HB, food is blocked even liquids. Wt stable    Diabetic Review of Systems: no polyuria or polydipsia, no hypoglycemia, has dysesthesias in the feet, OCcca CP usu dyspnea, has improved finished z pack. Allergies   Allergen Reactions    Codeine Hives       Diet and Lifestyle: smoker vapes only. Patient Active Problem List    Diagnosis Date Noted    Fatty infiltration of liver 07/29/2019    Pain, chronic 04/22/2019    Type 2 diabetes mellitus with hyperglycemia, without long-term current [facility] 05/08/2018    Type 2 diabetes mellitus with diabetic neuropathy ([facility] 03/14/2018    S/P laparoscopic hysterectomy 06/24/2014    Hyperlipidemia 02/04/2013    Anxiety 02/04/2013     Current Outpatient Medications   Medication Sig Dispense Refill    butalbital-acetaminophen-caffeine (FIORICET, ESGIC) -40 mg per tablet Take 1 Tab by mouth every six (6) hours as needed for Pain. 12 Tab 1    gabapentin (NEURONTIN) 800 mg tablet Take 1 Tab by mouth three (3) times daily. Max Daily Amount: 2,400 mg. 90 Tab 5    albuterol (PROVENTIL HFA, VENTOLIN HFA, PROAIR HFA) 90 mcg/actuation inhaler Take 1 Puff by inhalation every four (4) hours as needed for Wheezing. 1 Inhaler 5    estradiol (ESTRACE) 0.5 mg tablet Take 1 Tab by mouth daily. 90 Tab 1    buprenorphine-naloxone (SUBOXONE) 8-2 mg film sublingaul film by SubLINGual route.  metFORMIN (GLUCOPHAGE) 500 mg tablet Take 1 Tab by mouth two (2) times daily (with meals). 180 Tab 1    traZODone (DESYREL) 100 mg tablet Take 1 Tab by mouth nightly.  90 Tab 1    cyclobenzaprine (FLEXERIL) 10 mg tablet Take 1 Tab by mouth three (3) times daily as needed for Muscle Spasm(s). 60 Tab 2    furosemide (LASIX) 20 mg tablet Daily as needed for swelling 90 Tab 1     Allergies   Allergen Reactions    Codeine Hives     Social History     Tobacco Use    Smoking status: Current Every Day Smoker     Packs/day: 1.00     Years: 30.00     Pack years: 30.00    Smokeless tobacco: Current User    Tobacco comment: Vape User   Substance Use Topics    Alcohol use: No        Lab Results   Component Value Date/Time    WBC 8.7 03/15/2018 12:00 AM    HGB 14.6 03/15/2018 12:00 AM    HCT 42.8 03/15/2018 12:00 AM    PLATELET 731 (L) 64/89/4829 12:00 AM    MCV 90 03/15/2018 12:00 AM     Lab Results   Component Value Date/Time    Cholesterol, total 197 01/23/2019 03:39 PM    HDL Cholesterol 42 01/23/2019 03:39 PM    LDL, calculated 107 (H) 01/23/2019 03:39 PM    Triglyceride 238 (H) 01/23/2019 03:39 PM     Lab Results   Component Value Date/Time    ALT (SGPT) 15 07/29/2019 10:55 AM    AST (SGOT) 16 07/29/2019 10:55 AM    Alk. phosphatase 79 07/29/2019 10:55 AM    Bilirubin, direct 0.06 03/15/2018 12:00 AM    Bilirubin, total <0.2 07/29/2019 10:55 AM    Albumin 4.0 07/29/2019 10:55 AM    Protein, total 6.9 07/29/2019 10:55 AM    PLATELET 042 (L) 54/91/2785 12:00 AM     Lab Results   Component Value Date/Time    GFR est non- 07/29/2019 10:55 AM    GFR est  07/29/2019 10:55 AM    Creatinine 0.66 07/29/2019 10:55 AM    BUN 9 07/29/2019 10:55 AM    Sodium 140 07/29/2019 10:55 AM    Potassium 4.5 07/29/2019 10:55 AM    Chloride 101 07/29/2019 10:55 AM    CO2 24 07/29/2019 10:55 AM     Lab Results   Component Value Date/Time    Glucose 138 (H) 07/29/2019 10:55 AM    Glucose (POC) 133 (H) 09/24/2014 04:43 PM         Review of Systems  Pertinent items are noted in HPI.     Objective:     Significant for the following:     Visit Vitals  /71 (BP 1 Location: Left arm, BP Patient Position: Sitting)   Pulse 89   Temp 98.3 °F (36.8 °C) (Oral)   Resp 16   Ht 5' 5.5\" (1.664 m)   Wt 146 lb (66.2 kg)   LMP 06/03/2014   SpO2 97%   BMI 23.93 kg/m²     Appearance: alert, well appearing, and in no distress. Exam: heart sounds normal rate, regular rhythm, normal S1, S2, no murmurs, rubs, clicks or gallops, chest clear  Foot exam: deferred    Lab review: orders written for new lab studies as appropriate; see orders. Assessment/Plan:     Follow-up diabetes stable. Diabetic issues reviewed with her: all medications, side effects and compliance discussed carefully and glycohemoglobin and other lab monitoring discussed. Chronic Conditions Addressed Today     1. Type 2 diabetes mellitus with diabetic neuropathy (HCC)     Relevant Medications     butalbital-acetaminophen-caffeine (FIORICET, ESGIC) -40 mg per tablet     Other Relevant Orders     ME HANDLG&/OR CONVEY OF SPEC FOR TR OFFICE TO LAB     COLLECTION VENOUS BLOOD,VENIPUNCTURE    2. Type 2 diabetes mellitus with hyperglycemia, without long-term current use of insulin (HCC)     Relevant Orders     MICROALBUMIN, UR, RAND W/ MICROALB/CREAT RATIO (Completed)     METABOLIC PANEL, COMPREHENSIVE (Completed)     HEMOGLOBIN A1C WITH EAG (Completed)     ME HANDLG&/OR CONVEY OF SPEC FOR TR OFFICE TO LAB     COLLECTION VENOUS BLOOD,VENIPUNCTURE    3.  Pain, chronic     Relevant Orders     AMB POC URINALYSIS DIP STICK AUTO W/ MICRO (Completed)     ME HANDLG&/OR CONVEY OF SPEC FOR TR OFFICE TO LAB     COLLECTION VENOUS BLOOD,VENIPUNCTURE      Acute Diagnoses Addressed Today     Esophageal dysphagia    -  Primary        Relevant Orders        REFERRAL TO GASTROENTEROLOGY        ME HANDLG&/OR CONVEY OF SPEC FOR TR OFFICE TO LAB        COLLECTION VENOUS BLOOD,VENIPUNCTURE    Polyp of transverse colon, unspecified type            Relevant Orders        REFERRAL TO GASTROENTEROLOGY        ME HANDLG&/OR CONVEY OF SPEC FOR TR OFFICE TO LAB        COLLECTION VENOUS BLOOD,VENIPUNCTURE    Migraine with status migrainosus, not intractable, unspecified migraine type            Relevant Medications        butalbital-acetaminophen-caffeine (FIORICET, ESGIC) -40 mg per tablet        Other Relevant Orders        MT HANDLG&/OR CONVEY OF SPEC FOR TR OFFICE TO LAB        COLLECTION VENOUS BLOOD,VENIPUNCTURE        Orders Placed This Encounter    MICROALBUMIN, UR, RAND W/ MICROALB/CREAT RATIO    METABOLIC PANEL, COMPREHENSIVE    HEMOGLOBIN A1C WITH EAG    DIABETES PATIENT EDUCATION    REFERRAL TO GASTROENTEROLOGY     Referral Priority:   Routine     Referral Type:   Consultation     Referral Reason:   Specialty Services Required     Referral Location:   [facility]     Referred to Provider:   Thereasa Fothergill, MD     Number of Visits Requested:   1    AMB POC URINALYSIS DIP STICK AUTO W/ MICRO    MT HANDLG&/OR CONVEY OF SPEC FOR TR OFFICE TO LAB    COLLECTION VENOUS BLOOD,VENIPUNCTURE    butalbital-acetaminophen-caffeine (FIORICET, ESGIC) -40 mg per tablet     Sig: Take 1 Tab by mouth every six (6) hours as needed for Pain.      Dispense:  12 Tab     Refill:  1     Go see GI to eval  OK rf  fioricet

## 2019-07-30 LAB
ALBUMIN SERPL-MCNC: 4 G/DL (ref 3.5–5.5)
ALBUMIN/CREAT UR: 1.8 MG/G CREAT (ref 0–30)
ALBUMIN/GLOB SERPL: 1.4 {RATIO} (ref 1.2–2.2)
ALP SERPL-CCNC: 79 IU/L (ref 39–117)
ALT SERPL-CCNC: 15 IU/L (ref 0–32)
AST SERPL-CCNC: 16 IU/L (ref 0–40)
BILIRUB SERPL-MCNC: <0.2 MG/DL (ref 0–1.2)
BUN SERPL-MCNC: 9 MG/DL (ref 6–24)
BUN/CREAT SERPL: 14 (ref 9–23)
CALCIUM SERPL-MCNC: 8.6 MG/DL (ref 8.7–10.2)
CHLORIDE SERPL-SCNC: 101 MMOL/L (ref 96–106)
CO2 SERPL-SCNC: 24 MMOL/L (ref 20–29)
CREAT SERPL-MCNC: 0.66 MG/DL (ref 0.57–1)
CREAT UR-MCNC: 185.4 MG/DL
EST. AVERAGE GLUCOSE BLD GHB EST-MCNC: 160 MG/DL
GLOBULIN SER CALC-MCNC: 2.9 G/DL (ref 1.5–4.5)
GLUCOSE SERPL-MCNC: 138 MG/DL (ref 65–99)
HBA1C MFR BLD: 7.2 % (ref 4.8–5.6)
Lab: NORMAL
MICROALBUMIN UR-MCNC: 3.3 UG/ML
POTASSIUM SERPL-SCNC: 4.5 MMOL/L (ref 3.5–5.2)
PROT SERPL-MCNC: 6.9 G/DL (ref 6–8.5)
SODIUM SERPL-SCNC: 140 MMOL/L (ref 134–144)

## 2019-07-30 NOTE — PROGRESS NOTES
Send normal/stable results letter. Your results are normal/stable. If not signed up, consider getting my chart to get your results on-line. We can help you to sign up.   Diabetes is better

## 2019-09-04 ENCOUNTER — OFFICE VISIT (OUTPATIENT)
Dept: INTERNAL MEDICINE CLINIC | Age: 54
End: 2019-09-04

## 2019-09-04 VITALS
WEIGHT: 149 LBS | DIASTOLIC BLOOD PRESSURE: 79 MMHG | HEART RATE: 100 BPM | SYSTOLIC BLOOD PRESSURE: 133 MMHG | OXYGEN SATURATION: 93 % | RESPIRATION RATE: 20 BRPM | HEIGHT: 66 IN | TEMPERATURE: 100 F | BODY MASS INDEX: 23.95 KG/M2

## 2019-09-04 DIAGNOSIS — E11.40 TYPE 2 DIABETES MELLITUS WITH DIABETIC NEUROPATHY, WITHOUT LONG-TERM CURRENT USE OF INSULIN (HCC): ICD-10-CM

## 2019-09-04 DIAGNOSIS — E78.5 HYPERLIPIDEMIA, UNSPECIFIED HYPERLIPIDEMIA TYPE: ICD-10-CM

## 2019-09-04 DIAGNOSIS — J18.9 COMMUNITY ACQUIRED PNEUMONIA, UNSPECIFIED LATERALITY: ICD-10-CM

## 2019-09-04 DIAGNOSIS — F41.9 ANXIETY AND DEPRESSION: ICD-10-CM

## 2019-09-04 DIAGNOSIS — F32.A ANXIETY AND DEPRESSION: ICD-10-CM

## 2019-09-04 DIAGNOSIS — R07.81 PLEURODYNIA: Primary | ICD-10-CM

## 2019-09-04 DIAGNOSIS — E11.65 TYPE 2 DIABETES MELLITUS WITH HYPERGLYCEMIA, WITHOUT LONG-TERM CURRENT USE OF INSULIN (HCC): ICD-10-CM

## 2019-09-04 LAB
ALBUMIN SERPL-MCNC: 4.4 G/DL (ref 3.5–5.5)
ALBUMIN/GLOB SERPL: 1.5 {RATIO} (ref 1.2–2.2)
ALP SERPL-CCNC: 84 IU/L (ref 39–117)
ALT SERPL-CCNC: 16 IU/L (ref 0–32)
AST SERPL-CCNC: 19 IU/L (ref 0–40)
BASOPHILS # BLD AUTO: 0 X10E3/UL (ref 0–0.2)
BASOPHILS NFR BLD AUTO: 0 %
BILIRUB SERPL-MCNC: 0.6 MG/DL (ref 0–1.2)
BUN SERPL-MCNC: 11 MG/DL (ref 6–24)
BUN/CREAT SERPL: 16 (ref 9–23)
CALCIUM SERPL-MCNC: 9.6 MG/DL (ref 8.7–10.2)
CHLORIDE SERPL-SCNC: 95 MMOL/L (ref 96–106)
CO2 SERPL-SCNC: 21 MMOL/L (ref 20–29)
CREAT SERPL-MCNC: 0.67 MG/DL (ref 0.57–1)
D DIMER PPP FEU-MCNC: 0.53 MG/L FEU (ref 0–0.49)
EOSINOPHIL # BLD AUTO: 0.1 X10E3/UL (ref 0–0.4)
EOSINOPHIL NFR BLD AUTO: 1 %
ERYTHROCYTE [DISTWIDTH] IN BLOOD BY AUTOMATED COUNT: 13.9 % (ref 12.3–15.4)
GLOBULIN SER CALC-MCNC: 3 G/DL (ref 1.5–4.5)
GLUCOSE SERPL-MCNC: 126 MG/DL (ref 65–99)
HCT VFR BLD AUTO: 41.5 % (ref 34–46.6)
HGB BLD-MCNC: 14.6 G/DL (ref 11.1–15.9)
LYMPHOCYTES # BLD AUTO: 2.2 X10E3/UL (ref 0.7–3.1)
LYMPHOCYTES NFR BLD AUTO: 19 %
MCH RBC QN AUTO: 30.7 PG (ref 26.6–33)
MCHC RBC AUTO-ENTMCNC: 35.2 G/DL (ref 31.5–35.7)
MCV RBC AUTO: 87 FL (ref 79–97)
MONOCYTES # BLD AUTO: 0.9 X10E3/UL (ref 0.1–0.9)
MONOCYTES NFR BLD AUTO: 8 %
NEUTROPHILS # BLD AUTO: 8.3 X10E3/UL (ref 1.4–7)
NEUTROPHILS NFR BLD AUTO: 72 %
PLATELET # BLD AUTO: 143 X10E3/UL (ref 150–450)
POTASSIUM SERPL-SCNC: 4.1 MMOL/L (ref 3.5–5.2)
PROT SERPL-MCNC: 7.4 G/DL (ref 6–8.5)
RBC # BLD AUTO: 4.76 X10E6/UL (ref 3.77–5.28)
SODIUM SERPL-SCNC: 135 MMOL/L (ref 134–144)
WBC # BLD AUTO: 11.5 X10E3/UL (ref 3.4–10.8)

## 2019-09-04 RX ORDER — CEFUROXIME AXETIL 500 MG/1
500 TABLET ORAL 2 TIMES DAILY
Qty: 14 TAB | Refills: 0 | Status: SHIPPED | OUTPATIENT
Start: 2019-09-04 | End: 2019-09-11

## 2019-09-04 RX ORDER — FLUOXETINE HYDROCHLORIDE 40 MG/1
40 CAPSULE ORAL DAILY
Qty: 90 CAP | Refills: 1 | Status: SHIPPED | OUTPATIENT
Start: 2019-09-04 | End: 2020-02-08

## 2019-09-04 NOTE — PROGRESS NOTES
Subjective:   Edwin Jose is a 47 y.o. female who complains of congestion, dry cough, headache, fever and lung pain for 3 days, rapidly worsening since that time. She denies a history of rash on body and vomiting. Nauseated and dyspnic    Evaluation to date: none. Treatment to date: OTC products. Patient does smoke cigarettes. Was vaping some. Relevant PMH: DM.  Allergies   Allergen Reactions    Codeine Hives         Patient Active Problem List    Diagnosis Date Noted    Fatty infiltration of liver 07/29/2019    Pain, chronic 04/22/2019    Type 2 diabetes mellitus with hyperglycemia, without long-term current use of insulin ([facility] 05/08/2018    Type 2 diabetes mellitus with diabetic neuropathy ([facility] 03/14/2018    S/P laparoscopic hysterectomy 06/24/2014    Hyperlipidemia 02/04/2013    Anxiety 02/04/2013     Allergies   Allergen Reactions    Codeine Hives     Social History     Tobacco Use    Smoking status: Current Every Day Smoker     Packs/day: 1.00     Years: 30.00     Pack years: 30.00    Smokeless tobacco: Current User    Tobacco comment: Vape User   Substance Use Topics    Alcohol use: No        Review of Systems  Pertinent items are noted in HPI. Objective:     Visit Vitals  /79 (BP 1 Location: Left arm, BP Patient Position: Sitting)   Pulse 100   Temp 100 °F (37.8 °C) (Oral)   Resp 20   Ht 5' 5.5\" (1.664 m)   Wt 149 lb (67.6 kg)   LMP 06/03/2014   SpO2 93%   BMI 24.42 kg/m²     General:  alert, fatigued, cooperative, mild distress   Eyes: negative   Ears: normal TM's and external ear canals AU   Sinuses: Normal paranasal sinuses without tenderness   Mouth:  Lips, mucosa, and tongue normal. Teeth and gums normal   Neck: supple, symmetrical, trachea midline and no adenopathy. Heart: S1 and S2 normal, no murmurs noted. Lungs: clear to auscultation bilaterally, dec breath sounds   Abdomen: soft, non-tender.  Bowel sounds normal. No masses,  no organomegaly Assessment/Plan:   asthma and pneumonia  Antibiotics per orders. RTC in 7 days or PRN. Chronic Conditions Addressed Today     1. Hyperlipidemia    2. Type 2 diabetes mellitus with diabetic neuropathy (HCC)     Relevant Medications     FLUoxetine (PROZAC) 40 mg capsule    3. Type 2 diabetes mellitus with hyperglycemia, without long-term current use of insulin ([facility])      Acute Diagnoses Addressed Today     Pleurodynia    -  Primary        Relevant Orders        XR CHEST PA LAT        D DIMER    Community acquired pneumonia, unspecified laterality            Relevant Orders        METABOLIC PANEL, COMPREHENSIVE        CBC WITH AUTOMATED DIFF    Anxiety and depression            Relevant Medications        FLUoxetine (PROZAC) 40 mg capsule            Orders Placed This Encounter    XR CHEST PA LAT    METABOLIC PANEL, COMPREHENSIVE    CBC WITH AUTOMATED DIFF    D DIMER    CBC WITH AUTOMATED DIFF    METABOLIC PANEL, COMPREHENSIVE    D DIMER    cefUROXime (CEFTIN) 500 mg tablet    FLUoxetine (PROZAC) 40 mg capsule   . ICD-10-CM ICD-9-CM    1. Pleurodynia R07.81 786.52 XR CHEST PA LAT      D DIMER   2. Community acquired pneumonia, unspecified laterality V40.0 161 METABOLIC PANEL, COMPREHENSIVE      CBC WITH AUTOMATED DIFF   3. Type 2 diabetes mellitus with hyperglycemia, without long-term current use of insulin ([facility]) E11.65 250.00      790.29    4. Anxiety and depression F41.9 300.00     F32.9 311    5. Type 2 diabetes mellitus with diabetic neuropathy, without long-term current use of insulin ([facility]) E11.40 250.60      357.2    6. Hyperlipidemia, unspecified hyperlipidemia type E78.5 272.4      CP precautions given  Follow-up and Dispositions    · Return in about 1 week (around 9/11/2019).

## 2019-09-04 NOTE — PROGRESS NOTES
Chief Complaint   Patient presents with    Cold Symptoms     fever, chills, nonprod cough, ribcage and back pain, diarrhea, pt has not eaten in 3 days/nausea, abd pain     I have reviewed the patient's medical history in detail and updated the computerized patient record. Health Maintenance reviewed. 1. Have you been to the ER, urgent care clinic since your last visit? Hospitalized since your last visit?no    2. Have you seen or consulted any other health care providers outside of the [address] [place] [address] Matthew since your last visit? Include any pap smears or colon screening. No      Encouraged pt to discuss pt's wishes with spouse/partner/family and bring them in the next appt to follow thru with the Advanced Directive    Fall Risk Assessment, last 12 mths 1/23/2019   Able to walk? Yes   Fall in past 12 months? No       3 most recent PHQ Screens 9/4/2019   Little interest or pleasure in doing things Several days   Feeling down, depressed, irritable, or hopeless Several days   Total Score PHQ 2 2       Abuse Screening Questionnaire 1/23/2019   Do you ever feel afraid of your partner? N   Are you in a relationship with someone who physically or mentally threatens you? N   Is it safe for you to go home?  Y       ADL Assessment 1/23/2019   Feeding yourself No Help Needed   Getting from bed to chair No Help Needed   Getting dressed No Help Needed   Bathing or showering No Help Needed   Walk across the room (includes cane/walker) No Help Needed   Using the telphone No Help Needed   Taking your medications No Help Needed   Preparing meals No Help Needed   Managing money (expenses/bills) No Help Needed   Moderately strenuous housework (laundry) No Help Needed   Shopping for personal items (toiletries/medicines) No Help Needed   Shopping for groceries No Help Needed   Driving No Help Needed   Climbing a flight of stairs No Help Needed   Getting to places beyond walking distances No Help Needed

## 2019-09-05 ENCOUNTER — TELEPHONE (OUTPATIENT)
Dept: INTERNAL MEDICINE CLINIC | Age: 54
End: 2019-09-05

## 2019-09-05 NOTE — PROGRESS NOTES
Send results letter as below:  Your recent lab showed the following abnomality slightly elevated d dimes and WBC count. We need you to return to re-check the laboratory value again in a few days.

## 2019-09-05 NOTE — TELEPHONE ENCOUNTER
Still c/o pretty significant dyspnea and CP, not keeping down AB, will send to ER for an eval.  Case discussed DrDudley.

## 2019-11-04 ENCOUNTER — OFFICE VISIT (OUTPATIENT)
Dept: INTERNAL MEDICINE CLINIC | Age: 54
End: 2019-11-04

## 2019-11-04 VITALS
DIASTOLIC BLOOD PRESSURE: 85 MMHG | OXYGEN SATURATION: 94 % | BODY MASS INDEX: 23.63 KG/M2 | SYSTOLIC BLOOD PRESSURE: 139 MMHG | TEMPERATURE: 98.4 F | WEIGHT: 147 LBS | HEART RATE: 96 BPM | HEIGHT: 66 IN | RESPIRATION RATE: 18 BRPM

## 2019-11-04 DIAGNOSIS — E11.65 TYPE 2 DIABETES MELLITUS WITH HYPERGLYCEMIA, WITHOUT LONG-TERM CURRENT USE OF INSULIN (HCC): ICD-10-CM

## 2019-11-04 DIAGNOSIS — E11.40 TYPE 2 DIABETES MELLITUS WITH DIABETIC NEUROPATHY, WITHOUT LONG-TERM CURRENT USE OF INSULIN (HCC): ICD-10-CM

## 2019-11-04 DIAGNOSIS — Z72.0 TOBACCO ABUSE: ICD-10-CM

## 2019-11-04 DIAGNOSIS — J44.1 COPD EXACERBATION (HCC): Primary | ICD-10-CM

## 2019-11-04 PROBLEM — J44.9 COPD (CHRONIC OBSTRUCTIVE PULMONARY DISEASE) (HCC): Status: ACTIVE | Noted: 2019-11-04

## 2019-11-04 RX ORDER — PREDNISONE 20 MG/1
40 TABLET ORAL
Qty: 10 TAB | Refills: 0 | Status: SHIPPED | OUTPATIENT
Start: 2019-11-04 | End: 2019-11-09

## 2019-11-04 RX ORDER — AZITHROMYCIN 250 MG/1
250 TABLET, FILM COATED ORAL SEE ADMIN INSTRUCTIONS
Qty: 6 TAB | Refills: 0 | Status: SHIPPED | OUTPATIENT
Start: 2019-11-04 | End: 2020-02-11 | Stop reason: ALTCHOICE

## 2019-11-04 RX ORDER — ALBUTEROL SULFATE 90 UG/1
1 AEROSOL, METERED RESPIRATORY (INHALATION)
Qty: 1 INHALER | Refills: 5 | Status: SHIPPED | OUTPATIENT
Start: 2019-11-04 | End: 2022-05-19 | Stop reason: SDUPTHER

## 2019-11-04 RX ORDER — BUPROPION HYDROCHLORIDE 100 MG/1
TABLET ORAL
COMMUNITY
End: 2020-05-15 | Stop reason: ALTCHOICE

## 2019-11-04 NOTE — PROGRESS NOTES
Subjective:   Stephanie Nelson is a 47 y.o. female who complains of congestion, sneezing, post nasal drip, cough described as productive of green sputum, chills and wheezing for 5 days, gradually worsening since that time. She denies a history of rash on body and vomiting. Evaluation to date: none. Treatment to date: OTC products. Patient does smoke cigarettes. Relevant PMH: Asthma and COPD. Dm last A1C was 7.2    Allergies   Allergen Reactions    Codeine Hives         Patient Active Problem List    Diagnosis Date Noted    COPD (chronic obstructive pulmonary disease) ([facility] 11/04/2019    Fatty infiltration of liver 07/29/2019    Pain, chronic 04/22/2019    Type 2 diabetes mellitus with hyperglycemia, without long-term current use of insulin ([facility] 05/08/2018    Type 2 diabetes mellitus with diabetic neuropathy ([facility] 03/14/2018    S/P laparoscopic hysterectomy 06/24/2014    Hyperlipidemia 02/04/2013    Anxiety 02/04/2013     Allergies   Allergen Reactions    Codeine Hives     Social History     Tobacco Use    Smoking status: Current Every Day Smoker     Packs/day: 1.00     Years: 30.00     Pack years: 30.00    Smokeless tobacco: Current User    Tobacco comment: Vape User   Substance Use Topics    Alcohol use: No        Review of Systems  Pertinent items are noted in HPI. Objective:     Visit Vitals  /85   Pulse 96   Temp 98.4 °F (36.9 °C) (Axillary)   Resp 18   Ht 5' 5.5\" (1.664 m)   Wt 147 lb (66.7 kg)   LMP 06/03/2014   SpO2 94%   BMI 24.09 kg/m²     General:  alert, fatigued, cooperative, no distress, anxious   Eyes: negative   Ears: normal TM's and external ear canals AU   Sinuses: Normal paranasal sinuses without tenderness   Mouth:  Lips, mucosa, and tongue normal. Teeth and gums normal   Neck: supple, symmetrical, trachea midline and no adenopathy. Heart: S1 and S2 normal, no murmurs noted. Lungs: clear to auscultation bilaterally   Abdomen: soft, non-tender.  Bowel sounds normal. No masses,  no organomegaly      Assessment/Plan:   Asthma/copd  Suggested symptomatic OTC remedies. Antibiotics per orders. Encounter Diagnoses   Name Primary?  COPD exacerbation ([facility] Yes    Type 2 diabetes mellitus with hyperglycemia, without long-term current use of insulin ([facility])     Tobacco abuse     Type 2 diabetes mellitus with diabetic neuropathy, without long-term current use of insulin ([facility])      Orders Placed This Encounter    azithromycin (ZITHROMAX) 250 mg tablet    predniSONE (DELTASONE) 20 mg tablet    buPROPion (WELLBUTRIN) 100 mg tablet    albuterol (PROVENTIL HFA, VENTOLIN HFA, PROAIR HFA) 90 mcg/actuation inhaler   . The patient was counseled on the dangers of tobacco use, and was advised to quit. Reviewed strategies to maximize success, including pharmacotherapy (wellbutrin). Discussed possible side affects, precautions, and drug interactions and possible benefits of the medication(s). DM stable  Current Outpatient Medications   Medication Sig Dispense Refill    azithromycin (ZITHROMAX) 250 mg tablet Take 1 Tab by mouth See Admin Instructions. Take two tablets today then one tablet daily for next 4 days 6 Tab 0    predniSONE (DELTASONE) 20 mg tablet Take 40 mg by mouth daily (with breakfast) for 5 days. 10 Tab 0    buPROPion (WELLBUTRIN) 100 mg tablet Take  by mouth.  albuterol (PROVENTIL HFA, VENTOLIN HFA, PROAIR HFA) 90 mcg/actuation inhaler Take 1 Puff by inhalation every four (4) hours as needed for Wheezing. 1 Inhaler 5    FLUoxetine (PROZAC) 40 mg capsule Take 1 Cap by mouth daily. 90 Cap 1    butalbital-acetaminophen-caffeine (FIORICET, ESGIC) -40 mg per tablet Take 1 Tab by mouth every six (6) hours as needed for Pain. 12 Tab 1    gabapentin (NEURONTIN) 800 mg tablet Take 1 Tab by mouth three (3) times daily. Max Daily Amount: 2,400 mg. 90 Tab 5    estradiol (ESTRACE) 0.5 mg tablet Take 1 Tab by mouth daily.  90 Tab 1    buprenorphine-naloxone (SUBOXONE) 8-2 mg film sublingaul film by SubLINGual route.  metFORMIN (GLUCOPHAGE) 500 mg tablet Take 1 Tab by mouth two (2) times daily (with meals). 180 Tab 1    traZODone (DESYREL) 100 mg tablet Take 1 Tab by mouth nightly. 90 Tab 1    cyclobenzaprine (FLEXERIL) 10 mg tablet Take 1 Tab by mouth three (3) times daily as needed for Muscle Spasm(s). 60 Tab 2    furosemide (LASIX) 20 mg tablet Daily as needed for swelling 90 Tab 1       Follow-up and Dispositions    · Return in about 4 weeks (around 12/2/2019) for routine follow up.

## 2019-11-04 NOTE — PATIENT INSTRUCTIONS
Deciding About Using Medicines To Quit Smoking  How can you decide about using medicines to quit smoking? What are the medicines you can use? Your doctor may prescribe varenicline (Chantix) or bupropion (Zyban). These medicines can help you cope with cravings for tobacco. They are pills that don't contain nicotine. You also can use nicotine replacement products. These do contain nicotine. There are many types. · Gum and lozenges slowly release nicotine into your mouth. · Patches stick to your skin. They slowly release nicotine into your bloodstream.  · An inhaler has a connelly that contains nicotine. You breathe in a puff of nicotine vapor through your mouth and throat. · Nasal spray releases a mist that contains nicotine. What are key points about this decision? · Using medicines can double your chances of quitting smoking. They can ease cravings and withdrawal symptoms. · Getting counseling along with using medicine can raise your chances of quitting even more. · If you smoke fewer than 5 cigarettes a day, you may not need medicines to help you quit smoking. · These medicines have less nicotine than cigarettes. And by itself, nicotine is not nearly as harmful as smoking. The tars, carbon monoxide, and other toxic chemicals in tobacco cause the harmful effects. · The side effects of nicotine replacement products depend on the type of product. For example, a patch can make your skin red and itchy. Medicines in pill form can make you sick to your stomach. They can also cause dry mouth and trouble sleeping. For most people, the side effects are not bad enough to make them stop using the products. Why might you choose to use medicines to quit smoking? · You have tried on your own to stop smoking, but you were not able to stop. · You smoke more than 5 cigarettes a day. · You want to increase your chances of quitting smoking. · You want to reduce your cravings and withdrawal symptoms.   · You feel the benefits of medicine outweigh the side effects. Why might you choose not to use medicine? · You want to try quitting on your own by stopping all at once (\"cold turkey\"). · You want to cut back slowly on the number of cigarettes you smoke. · You smoke fewer than 5 cigarettes a day. · You do not like using medicine. · You feel the side effects of medicines outweigh the benefits. · You are worried about the cost of medicines. Your decision  Thinking about the facts and your feelings can help you make a decision that is right for you. Be sure you understand the benefits and risks of your options, and think about what else you need to do before you make the decision. Where can you learn more? Go to http://sea-clark.info/. Enter R884 in the search box to learn more about \"Deciding About Using Medicines To Quit Smoking. \"  Current as of: September 26, 2018  Content Version: 12.2  © 7182-2120 [employer]. Care instructions adapted under license by [employer] (which disclaims liability or warranty for this information). If you have questions about a medical condition or this instruction, always ask your healthcare professional. [place] [address] any warranty or liability for your use of this information.

## 2019-11-04 NOTE — PROGRESS NOTES
Chief Complaint   Patient presents with    Cough     prod cough thick yellow/green sputum     I have reviewed the patient's medical history in detail and updated the computerized patient record. Health Maintenance reviewed. 1. Have you been to the ER, urgent care clinic since your last visit? Hospitalized since your last visit?no    2. Have you seen or consulted any other health care providers outside of the [address] [place] [address] since your last visit? Include any pap smears or colon screening. No      Encouraged pt to discuss pt's wishes with spouse/partner/family and bring them in the next appt to follow thru with the Advanced Directive    Fall Risk Assessment, last 12 mths 1/23/2019   Able to walk? Yes   Fall in past 12 months? No       3 most recent PHQ Screens 11/4/2019   Little interest or pleasure in doing things More than half the days   Feeling down, depressed, irritable, or hopeless More than half the days   Total Score PHQ 2 4       Abuse Screening Questionnaire 1/23/2019   Do you ever feel afraid of your partner? N   Are you in a relationship with someone who physically or mentally threatens you? N   Is it safe for you to go home?  Y       ADL Assessment 1/23/2019   Feeding yourself No Help Needed   Getting from bed to chair No Help Needed   Getting dressed No Help Needed   Bathing or showering No Help Needed   Walk across the room (includes cane/walker) No Help Needed   Using the telphone No Help Needed   Taking your medications No Help Needed   Preparing meals No Help Needed   Managing money (expenses/bills) No Help Needed   Moderately strenuous housework (laundry) No Help Needed   Shopping for personal items (toiletries/medicines) No Help Needed   Shopping for groceries No Help Needed   Driving No Help Needed   Climbing a flight of stairs No Help Needed   Getting to places beyond walking distances No Help Needed

## 2020-01-02 DIAGNOSIS — E11.40 NEUROPATHY DUE TO TYPE 2 DIABETES MELLITUS (HCC): ICD-10-CM

## 2020-01-02 NOTE — TELEPHONE ENCOUNTER
Requested Prescriptions     Pending Prescriptions Disp Refills    gabapentin (NEURONTIN) 800 mg tablet 90 Tab 5     Sig: Take 1 Tab by mouth three (3) times daily. Max Daily Amount: 2,400 mg.

## 2020-01-03 NOTE — TELEPHONE ENCOUNTER
Requested Prescriptions     Pending Prescriptions Disp Refills    gabapentin (NEURONTIN) 800 mg tablet 90 Tab 5     Sig: Take 1 Tab by mouth three (3) times daily. Max Daily Amount: 2,400 mg. Last office visit 11/4/19 future ?   Last filled  7/1/19  Changes made to medication on last visit  None

## 2020-01-04 RX ORDER — GABAPENTIN 800 MG/1
800 TABLET ORAL 3 TIMES DAILY
Qty: 90 TAB | Refills: 0 | Status: SHIPPED | OUTPATIENT
Start: 2020-01-04 | End: 2020-02-11 | Stop reason: SDUPTHER

## 2020-02-06 DIAGNOSIS — E11.40 NEUROPATHY DUE TO TYPE 2 DIABETES MELLITUS (HCC): ICD-10-CM

## 2020-02-06 RX ORDER — ESTRADIOL 0.5 MG/1
0.5 TABLET ORAL DAILY
Qty: 90 TAB | Refills: 1 | Status: CANCELLED | OUTPATIENT
Start: 2020-02-06

## 2020-02-06 RX ORDER — FLUOXETINE HYDROCHLORIDE 40 MG/1
40 CAPSULE ORAL DAILY
Qty: 90 CAP | Refills: 1 | Status: CANCELLED | OUTPATIENT
Start: 2020-02-06

## 2020-02-06 NOTE — TELEPHONE ENCOUNTER
Requested Prescriptions     Pending Prescriptions Disp Refills    gabapentin (NEURONTIN) 800 mg tablet 90 Tab 0     Sig: Take 1 Tab by mouth three (3) times daily. Max Daily Amount: 2,400 mg.    FLUoxetine (PROZAC) 40 mg capsule 90 Cap 1     Sig: Take 1 Cap by mouth daily.  estradioL (ESTRACE) 0.5 mg tablet 90 Tab 1     Sig: Take 1 Tab by mouth daily.

## 2020-02-10 RX ORDER — GABAPENTIN 800 MG/1
800 TABLET ORAL 3 TIMES DAILY
Qty: 90 TAB | Refills: 0 | OUTPATIENT
Start: 2020-02-10

## 2020-02-10 NOTE — TELEPHONE ENCOUNTER
Requested Prescriptions     Pending Prescriptions Disp Refills    gabapentin (NEURONTIN) 800 mg tablet 90 Tab 0     Sig: Take 1 Tab by mouth three (3) times daily. Max Daily Amount: 2,400 mg.      Last office visit:  11/04/2019  Last filled:  1/4/2020 #90 X no refills  No changes  No follow up scheduled

## 2020-02-11 ENCOUNTER — OFFICE VISIT (OUTPATIENT)
Dept: INTERNAL MEDICINE CLINIC | Age: 55
End: 2020-02-11

## 2020-02-11 VITALS
HEART RATE: 76 BPM | TEMPERATURE: 98.1 F | BODY MASS INDEX: 25.4 KG/M2 | WEIGHT: 155 LBS | SYSTOLIC BLOOD PRESSURE: 164 MMHG | OXYGEN SATURATION: 96 % | DIASTOLIC BLOOD PRESSURE: 102 MMHG | RESPIRATION RATE: 16 BRPM

## 2020-02-11 DIAGNOSIS — Z23 ENCOUNTER FOR IMMUNIZATION: Primary | ICD-10-CM

## 2020-02-11 DIAGNOSIS — F41.9 ANXIETY: ICD-10-CM

## 2020-02-11 DIAGNOSIS — E11.65 TYPE 2 DIABETES MELLITUS WITH HYPERGLYCEMIA, WITHOUT LONG-TERM CURRENT USE OF INSULIN (HCC): ICD-10-CM

## 2020-02-11 DIAGNOSIS — E11.40 NEUROPATHY DUE TO TYPE 2 DIABETES MELLITUS (HCC): ICD-10-CM

## 2020-02-11 RX ORDER — GABAPENTIN 800 MG/1
800 TABLET ORAL 3 TIMES DAILY
Qty: 90 TAB | Refills: 2 | Status: SHIPPED | OUTPATIENT
Start: 2020-02-11 | End: 2020-05-15 | Stop reason: SDUPTHER

## 2020-02-11 RX ORDER — ESTRADIOL 1 MG/1
TABLET ORAL
Qty: 30 TAB | Refills: 5 | Status: SHIPPED | OUTPATIENT
Start: 2020-02-11 | End: 2020-12-24 | Stop reason: ALTCHOICE

## 2020-02-11 RX ORDER — CLONIDINE HYDROCHLORIDE 0.1 MG/1
0.2 TABLET ORAL
Qty: 60 TAB | Refills: 5 | Status: SHIPPED | OUTPATIENT
Start: 2020-02-11 | End: 2020-12-24 | Stop reason: SDUPTHER

## 2020-02-11 RX ORDER — FLUOXETINE HYDROCHLORIDE 40 MG/1
40 CAPSULE ORAL 2 TIMES DAILY
Qty: 180 CAP | Refills: 1 | Status: SHIPPED | OUTPATIENT
Start: 2020-02-11 | End: 2020-08-24 | Stop reason: SDUPTHER

## 2020-02-11 NOTE — PROGRESS NOTES
Chief Complaint   Patient presents with    Leg Pain     med refill     I have reviewed the patient's medical history in detail and updated the computerized patient record. Health Maintenance reviewed. 1. Have you been to the ER, urgent care clinic since your last visit? Hospitalized since your last visit? no    2. Have you seen or consulted any other health care providers outside of the [address] [place] [address] since your last visit? Include any pap smears or colon screening. No      Encouraged pt to discuss pt's wishes with spouse/partner/family and bring them in the next appt to follow thru with the Advanced Directive    Fall Risk Assessment, last 12 mths 2/11/2020   Able to walk? Yes   Fall in past 12 months? No       3 most recent PHQ Screens 2/11/2020   Little interest or pleasure in doing things More than half the days   Feeling down, depressed, irritable, or hopeless More than half the days   Total Score PHQ 2 4       Abuse Screening Questionnaire 2/11/2020   Do you ever feel afraid of your partner? N   Are you in a relationship with someone who physically or mentally threatens you? N   Is it safe for you to go home?  Y       ADL Assessment 2/11/2020   Feeding yourself No Help Needed   Getting from bed to chair No Help Needed   Getting dressed No Help Needed   Bathing or showering No Help Needed   Walk across the room (includes cane/walker) No Help Needed   Using the telphone No Help Needed   Taking your medications No Help Needed   Preparing meals No Help Needed   Managing money (expenses/bills) No Help Needed   Moderately strenuous housework (laundry) No Help Needed   Shopping for personal items (toiletries/medicines) No Help Needed   Shopping for groceries No Help Needed   Driving No Help Needed   Climbing a flight of stairs No Help Needed   Getting to places beyond walking distances No Help Needed

## 2020-02-11 NOTE — PROGRESS NOTES
Subjective:     Zulma Escobedo is a 47 y.o. female seen for follow-up of diabetes. She has had hypoglycemic attacks. .no  Blood sugar control has been Stable  Lab Results   Component Value Date/Time    Hemoglobin A1c 7.2 (H) 07/29/2019 10:55 AM    Hemoglobin A1c 7.8 (H) 01/23/2019 03:39 PM    Hemoglobin A1c 7.7 (H) 03/14/2018 04:22 PM    Glucose 126 (H) 09/04/2019 01:15 PM    Glucose (POC) 133 (H) 09/24/2014 04:43 PM    Microalb/Creat ratio (ug/mg creat.) 1.8 07/29/2019 10:31 AM    LDL, calculated 107 (H) 01/23/2019 03:39 PM    Creatinine 0.67 09/04/2019 01:15 PM       She has diabetes, hypertension and Hx of narcotic addiction. Zulma Escobedo has the additional concern of depression worse and terrible hot flashes. Wants to inc prozac, no suicidal ideatoin. SO has dementia my Pt, unDx. She says his memory worse      Diabetic Review of Systems: no polyuria or polydipsia, no chest pain, dyspnea or TIA's, no hypoglycemia, has dysesthesias in the feet. Allergies   Allergen Reactions    Codeine Hives       Diet and Lifestyle: follows a diabetic diet regularly, smoker 1 pack daily. Patient Active Problem List    Diagnosis Date Noted    COPD (chronic obstructive pulmonary disease) ([facility] 11/04/2019    Fatty infiltration of liver 07/29/2019    Pain, chronic 04/22/2019    Type 2 diabetes mellitus with hyperglycemia, without long-term current use of insulin ([facility] 05/08/2018    Type 2 diabetes mellitus with diabetic neuropathy ([facility] 03/14/2018    S/P laparoscopic hysterectomy 06/24/2014    Hyperlipidemia 02/04/2013    Anxiety 02/04/2013     Current Outpatient Medications   Medication Sig Dispense Refill    cloNIDine HCL (CATAPRES) 0.1 mg tablet Take 2 Tabs by mouth nightly. Start 1 at night increase as tolerated 60 Tab 5    estradioL (ESTRACE) 1 mg tablet TAKE 1 TABLET BY MOUTH ONCE DAILY 30 Tab 5    gabapentin (NEURONTIN) 800 mg tablet Take 1 Tab by mouth three (3) times daily.  Max Daily Amount: 2,400 mg. 90 Tab 2    FLUoxetine (PROZAC) 40 mg capsule Take 1 Cap by mouth two (2) times a day. 180 Cap 1    buPROPion (WELLBUTRIN) 100 mg tablet Take  by mouth.  albuterol (PROVENTIL HFA, VENTOLIN HFA, PROAIR HFA) 90 mcg/actuation inhaler Take 1 Puff by inhalation every four (4) hours as needed for Wheezing. 1 Inhaler 5    butalbital-acetaminophen-caffeine (FIORICET, ESGIC) -40 mg per tablet Take 1 Tab by mouth every six (6) hours as needed for Pain. 12 Tab 1    buprenorphine-naloxone (SUBOXONE) 8-2 mg film sublingaul film by SubLINGual route.  metFORMIN (GLUCOPHAGE) 500 mg tablet Take 1 Tab by mouth two (2) times daily (with meals). 180 Tab 1    traZODone (DESYREL) 100 mg tablet Take 1 Tab by mouth nightly. 90 Tab 1    cyclobenzaprine (FLEXERIL) 10 mg tablet Take 1 Tab by mouth three (3) times daily as needed for Muscle Spasm(s). 60 Tab 2    furosemide (LASIX) 20 mg tablet Daily as needed for swelling 90 Tab 1     Allergies   Allergen Reactions    Codeine Hives     Social History     Tobacco Use    Smoking status: Current Every Day Smoker     Packs/day: 1.00     Years: 30.00     Pack years: 30.00    Smokeless tobacco: Current User    Tobacco comment: Vape User   Substance Use Topics    Alcohol use: No             Review of Systems  Pertinent items are noted in HPI. Objective:     Significant for the following:     Visit Vitals  BP (!) 164/102   Pulse 76   Temp 98.1 °F (36.7 °C) (Oral)   Resp 16   Wt 155 lb (70.3 kg)   LMP 06/03/2014   SpO2 96%   BMI 25.40 kg/m²     Appearance: alert, well appearing, and in no distress and anxious. Exam: heart sounds normal rate, regular rhythm, normal S1, S2, no murmurs, rubs, clicks or gallops, chest clear, no hepatosplenomegaly  Foot exam: Diabetic foot exam was performed. No obvious sores or red lesions. Sensation checked by monofilament exam which was normal.  Dorsalis pedis pulse wasred.       Lab review: labs reviewed, I note that glycosylated hemoglobin mildly abnormal but acceptable. Assessment/Plan:     Follow-up diabetes stable. Diabetic issues reviewed with her: foot care discussed and Podiatry visits discussed, glycohemoglobin and other lab monitoring discussed and patient urged in the strongest terms to quit smoking. Chronic Conditions Addressed Today     1. Anxiety     Relevant Medications     gabapentin (NEURONTIN) 800 mg tablet     FLUoxetine (PROZAC) 40 mg capsule    2. Type 2 diabetes mellitus with hyperglycemia, without long-term current use of insulin (HCC)     Relevant Medications     gabapentin (NEURONTIN) 800 mg tablet     Other Relevant Orders     METABOLIC PANEL, BASIC     HEMOGLOBIN A1C WITH EAG      Acute Diagnoses Addressed Today     Encounter for immunization    -  Primary        Relevant Orders        INFLUENZA VIRUS VAC QUAD,SPLIT,PRESV FREE SYRINGE IM        CA IMMUNIZ ADMIN,1 SINGLE/COMB VAC/TOXOID    Neuropathy due to type 2 diabetes mellitus (HCC)            Relevant Medications        gabapentin (NEURONTIN) 800 mg tablet        FLUoxetine (PROZAC) 40 mg capsule        Orders Placed This Encounter    CA IMMUNIZ ADMIN,1 SINGLE/COMB VAC/TOXOID    INFLUENZA VIRUS VACCINE QUADRIVALENT, PRESERVATIVE FREE SYRINGE (73022)    METABOLIC PANEL, BASIC     Standing Status:   Future     Standing Expiration Date:   8/13/2020    HEMOGLOBIN A1C WITH EAG     Standing Status:   Future     Standing Expiration Date:   8/10/2020    cloNIDine HCL (CATAPRES) 0.1 mg tablet     Sig: Take 2 Tabs by mouth nightly. Start 1 at night increase as tolerated     Dispense:  60 Tab     Refill:  5    estradioL (ESTRACE) 1 mg tablet     Sig: TAKE 1 TABLET BY MOUTH ONCE DAILY     Dispense:  30 Tab     Refill:  5    gabapentin (NEURONTIN) 800 mg tablet     Sig: Take 1 Tab by mouth three (3) times daily. Max Daily Amount: 2,400 mg. Dispense:  90 Tab     Refill:  2     Appointment needed to refill this medication again.     FLUoxetine (PROZAC) 40 mg capsule     Sig: Take 1 Cap by mouth two (2) times a day. Dispense:  180 Cap     Refill:  1   On HRT for PMS still smoking inc risk and BP up, instead of inc hormone, add clonidine. Discussed possible side affects, precautions, and drug interactions and possible benefits of the medication(s). Follow-up and Dispositions    · Return in about 3 months (around 5/11/2020) for routine follow up.

## 2020-04-28 ENCOUNTER — TELEPHONE (OUTPATIENT)
Dept: INTERNAL MEDICINE CLINIC | Age: 55
End: 2020-04-28

## 2020-05-13 DIAGNOSIS — E11.40 NEUROPATHY DUE TO TYPE 2 DIABETES MELLITUS (HCC): ICD-10-CM

## 2020-05-13 RX ORDER — GABAPENTIN 800 MG/1
800 TABLET ORAL 3 TIMES DAILY
Qty: 90 TAB | Refills: 2 | OUTPATIENT
Start: 2020-05-13

## 2020-05-13 NOTE — TELEPHONE ENCOUNTER
Requested Prescriptions     Pending Prescriptions Disp Refills    gabapentin (NEURONTIN) 800 mg tablet 90 Tab 2     Sig: Take 1 Tab by mouth three (3) times daily. Max Daily Amount: 2,400 mg.

## 2020-05-14 ENCOUNTER — TELEPHONE (OUTPATIENT)
Dept: INTERNAL MEDICINE CLINIC | Age: 55
End: 2020-05-14

## 2020-05-15 ENCOUNTER — VIRTUAL VISIT (OUTPATIENT)
Dept: INTERNAL MEDICINE CLINIC | Age: 55
End: 2020-05-15

## 2020-05-15 DIAGNOSIS — G43.901 MIGRAINE WITH STATUS MIGRAINOSUS, NOT INTRACTABLE, UNSPECIFIED MIGRAINE TYPE: ICD-10-CM

## 2020-05-15 DIAGNOSIS — Z13.220 SCREENING CHOLESTEROL LEVEL: ICD-10-CM

## 2020-05-15 DIAGNOSIS — E11.40 NEUROPATHY DUE TO TYPE 2 DIABETES MELLITUS (HCC): ICD-10-CM

## 2020-05-15 DIAGNOSIS — G47.00 INSOMNIA, UNSPECIFIED TYPE: ICD-10-CM

## 2020-05-15 DIAGNOSIS — Z72.0 TOBACCO ABUSE: ICD-10-CM

## 2020-05-15 DIAGNOSIS — E11.40 TYPE 2 DIABETES MELLITUS WITH DIABETIC NEUROPATHY, WITHOUT LONG-TERM CURRENT USE OF INSULIN (HCC): Primary | ICD-10-CM

## 2020-05-15 RX ORDER — VARENICLINE TARTRATE 25 MG
KIT ORAL
Qty: 1 DOSE PACK | Refills: 0 | Status: SHIPPED | OUTPATIENT
Start: 2020-05-15 | End: 2020-10-05

## 2020-05-15 RX ORDER — METFORMIN HYDROCHLORIDE 500 MG/1
500 TABLET ORAL 2 TIMES DAILY WITH MEALS
Qty: 180 TAB | Refills: 1 | Status: SHIPPED | OUTPATIENT
Start: 2020-05-15 | End: 2020-06-18

## 2020-05-15 RX ORDER — GABAPENTIN 800 MG/1
800 TABLET ORAL 3 TIMES DAILY
Qty: 90 TAB | Refills: 2 | Status: SHIPPED | OUTPATIENT
Start: 2020-05-15 | End: 2020-08-24 | Stop reason: SDUPTHER

## 2020-05-15 RX ORDER — TRAZODONE HYDROCHLORIDE 100 MG/1
100 TABLET ORAL
Qty: 30 TAB | Refills: 2 | Status: SHIPPED | OUTPATIENT
Start: 2020-05-15 | End: 2021-04-01 | Stop reason: SDUPTHER

## 2020-05-15 NOTE — PROGRESS NOTES
Consent: Sheryle Aures, who was seen by synchronous (real-time) audio-video technology, and/or her healthcare decision maker, is aware that this patient-initiated, Telehealth encounter on 5/15/2020 is a billable service, with coverage as determined by her insurance carrier. She is aware that she may receive a bill and has provided verbal consent to proceed: Yes. PROGRESS NOTE        SUBJECTIVE:  Diagnosis/Chief Complaint: Generalized Body Aches (med refill)  Diabetes reasonably well controlled. No hypoglycemia  -150 in AM  Lab Results   Component Value Date/Time    Hemoglobin A1c 7.2 (H) 07/29/2019 10:55 AM    Hemoglobin A1c 7.8 (H) 01/23/2019 03:39 PM    Hemoglobin A1c 7.7 (H) 03/14/2018 04:22 PM    Glucose 126 (H) 09/04/2019 01:15 PM    Glucose (POC) 133 (H) 09/24/2014 04:43 PM    Microalb/Creat ratio (ug/mg creat.) 1.8 07/29/2019 10:31 AM    LDL, calculated 107 (H) 01/23/2019 03:39 PM    Creatinine 0.67 09/04/2019 01:15 PM       Doing well with pain yes - helpful, gabapentin  Improvement in function: yes - as rx  Pain levels 3or 4/10   Usage of benzodiazapine or other sedatives no  Symptoms N/T in legs and some back pain as well  Activities: Able to do activities of daily living. yes - ok  Side affects: no, despite the high dosage. States taking medications per medicine list.yes - as rx   queried no  Urine drug screen done no  Opiod Rx exceeds 50 MME/dayno  Hx of depression no  Hx of drug addiction: yes - on suboxone states no substance abuse.   Safe storage of the opiods: N/A  Narcotic contract reviewed and discussed: N/A    Patient Active Problem List    Diagnosis Date Noted    COPD (chronic obstructive pulmonary disease) ([facility] 11/04/2019    Fatty infiltration of liver 07/29/2019    Pain, chronic 04/22/2019    Type 2 diabetes mellitus with hyperglycemia, without long-term current use of insulin ([facility] 05/08/2018    Type 2 diabetes mellitus with diabetic neuropathy ([facility] 03/14/2018    S/P laparoscopic hysterectomy 06/24/2014    Hyperlipidemia 02/04/2013    Anxiety 02/04/2013     Current Outpatient Medications   Medication Sig Dispense Refill    varenicline (CHANTIX STARTER GRACE) 0.5 mg (11)- 1 mg (42) DsPk Use as directed 1 Dose Pack 0    gabapentin (NEURONTIN) 800 mg tablet Take 1 Tab by mouth three (3) times daily. Max Daily Amount: 2,400 mg. 90 Tab 2    metFORMIN (GLUCOPHAGE) 500 mg tablet Take 1 Tab by mouth two (2) times daily (with meals). 180 Tab 1    traZODone (DESYREL) 100 mg tablet Take 1 Tab by mouth nightly. As needed 30 Tab 2    cloNIDine HCL (CATAPRES) 0.1 mg tablet Take 2 Tabs by mouth nightly. Start 1 at night increase as tolerated 60 Tab 5    estradioL (ESTRACE) 1 mg tablet TAKE 1 TABLET BY MOUTH ONCE DAILY 30 Tab 5    FLUoxetine (PROZAC) 40 mg capsule Take 1 Cap by mouth two (2) times a day. 180 Cap 1    albuterol (PROVENTIL HFA, VENTOLIN HFA, PROAIR HFA) 90 mcg/actuation inhaler Take 1 Puff by inhalation every four (4) hours as needed for Wheezing. 1 Inhaler 5    butalbital-acetaminophen-caffeine (FIORICET, ESGIC) -40 mg per tablet Take 1 Tab by mouth every six (6) hours as needed for Pain. 12 Tab 1    buprenorphine-naloxone (SUBOXONE) 8-2 mg film sublingaul film by SubLINGual route.  cyclobenzaprine (FLEXERIL) 10 mg tablet Take 1 Tab by mouth three (3) times daily as needed for Muscle Spasm(s).  60 Tab 2    furosemide (LASIX) 20 mg tablet Daily as needed for swelling 90 Tab 1     Allergies   Allergen Reactions    Codeine Hives     Social History     Tobacco Use    Smoking status: Current Every Day Smoker     Packs/day: 1.00     Years: 30.00     Pack years: 30.00    Smokeless tobacco: Current User    Tobacco comment: Vape User   Substance Use Topics    Alcohol use: No        OBJECTIVE:    .  Visit Vitals  [facility] 06/03/2014     WDWN in NAD, mental status normal  Psychiatric: Normal mood, judgement    Reviewed: Medications, allergies, clinical lab test results and imaging results have been reviewed. Any abnormal findings have been addressed. ASSESSMENT:       ICD-10-CM ICD-9-CM    1. Type 2 diabetes mellitus with diabetic neuropathy, without long-term current use of insulin ([facility]) O42.97 255.67 METABOLIC PANEL, BASIC     357.2 HEMOGLOBIN A1C WITH EAG      metFORMIN (GLUCOPHAGE) 500 mg tablet   2. Screening cholesterol level Z13.220 V77.91 LIPID PANEL   3. Tobacco abuse Z72.0 305.1 varenicline (CHANTIX STARTER GRACE) 0.5 mg (11)- 1 mg (42) DsPk   4. Neuropathy due to type 2 diabetes mellitus ([facility]) E11.40 250.60 gabapentin (NEURONTIN) 800 mg tablet     357.2    5. Insomnia, unspecified type G47.00 780.52 traZODone (DESYREL) 100 mg tablet   6. Migraine with status migrainosus, not intractable, unspecified migraine type G43.901 346.92 traZODone (DESYREL) 100 mg tablet         Patient is 54 y.o. with diagnosis of :   Patient Active Problem List   Diagnosis Code    Hyperlipidemia E78.5    Anxiety F41.9    S/P laparoscopic hysterectomy Z90.710    Type 2 diabetes mellitus with diabetic neuropathy ([facility]) E11.40    Type 2 diabetes mellitus with hyperglycemia, without long-term current use of insulin ([facility]) E11.65    Pain, chronic G89.29    Fatty infiltration of liver K76.0    [facility] J44.9       PLAN:     Orders Placed This Encounter    LIPID PANEL     Standing Status:   Future     Standing Expiration Date:   71/26/6753    METABOLIC PANEL, BASIC     Standing Status:   Future     Standing Expiration Date:   11/15/2020    HEMOGLOBIN A1C WITH EAG     Standing Status:   Future     Standing Expiration Date:   11/12/2020    varenicline (CHANTIX STARTER GRACE) 0.5 mg (11)- 1 mg (42) DsPk     Sig: Use as directed     Dispense:  1 Dose Pack     Refill:  0    gabapentin (NEURONTIN) 800 mg tablet     Sig: Take 1 Tab by mouth three (3) times daily. Max Daily Amount: 2,400 mg.      Dispense:  90 Tab     Refill:  2    metFORMIN (GLUCOPHAGE) 500 mg tablet     Sig: Take 1 Tab by mouth two (2) times daily (with meals). Dispense:  180 Tab     Refill:  1    traZODone (DESYREL) 100 mg tablet     Sig: Take 1 Tab by mouth nightly. As needed     Dispense:  30 Tab     Refill:  2       Diabetes stable labs prior to next visit  Okay refill of gabapentin. The patient was counseled on the dangers of tobacco use, and was advised to quit and reluctant to quit. Reviewed strategies to maximize success, including stress management. Follow-up and Dispositions    · Return in about 3 months (around 8/15/2020).

## 2020-06-18 DIAGNOSIS — E11.40 TYPE 2 DIABETES MELLITUS WITH DIABETIC NEUROPATHY, WITHOUT LONG-TERM CURRENT USE OF INSULIN (HCC): ICD-10-CM

## 2020-06-18 RX ORDER — METFORMIN HYDROCHLORIDE 500 MG/1
TABLET ORAL
Qty: 180 TAB | Refills: 1 | Status: SHIPPED | OUTPATIENT
Start: 2020-06-18 | End: 2020-10-05 | Stop reason: SDUPTHER

## 2020-08-19 DIAGNOSIS — E11.40 NEUROPATHY DUE TO TYPE 2 DIABETES MELLITUS (HCC): ICD-10-CM

## 2020-08-19 NOTE — TELEPHONE ENCOUNTER
Requested Prescriptions     Pending Prescriptions Disp Refills    gabapentin (NEURONTIN) 800 mg tablet 90 Tab 2     Sig: Take 1 Tab by mouth three (3) times daily. Max Daily Amount: 2,400 mg.    FLUoxetine (PROzac) 40 mg capsule 180 Cap 1     Sig: Take 1 Cap by mouth two (2) times a day.

## 2020-08-21 NOTE — TELEPHONE ENCOUNTER
Requested Prescriptions     Pending Prescriptions Disp Refills    gabapentin (NEURONTIN) 800 mg tablet 270 Tab 1     Sig: Take 1 Tab by mouth three (3) times daily. Max Daily Amount: 2,400 mg.    FLUoxetine (PROzac) 40 mg capsule 180 Cap 1     Sig: Take 1 Cap by mouth two (2) times a day. Last office visit  5/15/20 future ?   Last filled  5/15/20 and 2/11/20  Changes made to medication on last visit      None

## 2020-08-23 RX ORDER — GABAPENTIN 800 MG/1
800 TABLET ORAL 3 TIMES DAILY
Qty: 270 TAB | Refills: 1 | OUTPATIENT
Start: 2020-08-23

## 2020-08-23 RX ORDER — FLUOXETINE HYDROCHLORIDE 40 MG/1
40 CAPSULE ORAL 2 TIMES DAILY
Qty: 180 CAP | Refills: 1 | OUTPATIENT
Start: 2020-08-23

## 2020-08-24 RX ORDER — GABAPENTIN 800 MG/1
800 TABLET ORAL 3 TIMES DAILY
Qty: 90 TAB | Refills: 0 | Status: SHIPPED | OUTPATIENT
Start: 2020-08-24 | End: 2020-10-05 | Stop reason: SDUPTHER

## 2020-08-24 RX ORDER — FLUOXETINE HYDROCHLORIDE 40 MG/1
40 CAPSULE ORAL 2 TIMES DAILY
Qty: 180 CAP | Refills: 1 | Status: SHIPPED | OUTPATIENT
Start: 2020-08-24 | End: 2020-12-24 | Stop reason: SDUPTHER

## 2020-10-05 ENCOUNTER — VIRTUAL VISIT (OUTPATIENT)
Dept: INTERNAL MEDICINE CLINIC | Age: 55
End: 2020-10-05
Payer: COMMERCIAL

## 2020-10-05 DIAGNOSIS — F41.9 ANXIETY: ICD-10-CM

## 2020-10-05 DIAGNOSIS — E11.40 NEUROPATHY DUE TO TYPE 2 DIABETES MELLITUS (HCC): ICD-10-CM

## 2020-10-05 DIAGNOSIS — Z12.31 ENCOUNTER FOR SCREENING MAMMOGRAM FOR MALIGNANT NEOPLASM OF BREAST: ICD-10-CM

## 2020-10-05 DIAGNOSIS — K21.00 GASTROESOPHAGEAL REFLUX DISEASE WITH ESOPHAGITIS WITHOUT HEMORRHAGE: ICD-10-CM

## 2020-10-05 DIAGNOSIS — E78.00 PURE HYPERCHOLESTEROLEMIA: ICD-10-CM

## 2020-10-05 DIAGNOSIS — J44.9 CHRONIC OBSTRUCTIVE PULMONARY DISEASE, UNSPECIFIED COPD TYPE (HCC): Primary | ICD-10-CM

## 2020-10-05 DIAGNOSIS — Z12.11 SCREENING FOR COLON CANCER: ICD-10-CM

## 2020-10-05 DIAGNOSIS — E11.40 TYPE 2 DIABETES MELLITUS WITH DIABETIC NEUROPATHY, WITHOUT LONG-TERM CURRENT USE OF INSULIN (HCC): ICD-10-CM

## 2020-10-05 PROCEDURE — 99214 OFFICE O/P EST MOD 30 MIN: CPT | Performed by: FAMILY MEDICINE

## 2020-10-05 RX ORDER — FUROSEMIDE 20 MG/1
TABLET ORAL
Qty: 60 TAB | Refills: 1 | Status: SHIPPED | OUTPATIENT
Start: 2020-10-05 | End: 2021-04-01 | Stop reason: SDUPTHER

## 2020-10-05 RX ORDER — FAMOTIDINE 40 MG/1
40 TABLET, FILM COATED ORAL DAILY
Qty: 30 TAB | Refills: 5 | Status: SHIPPED | OUTPATIENT
Start: 2020-10-05 | End: 2021-04-01 | Stop reason: SDUPTHER

## 2020-10-05 RX ORDER — METFORMIN HYDROCHLORIDE 500 MG/1
TABLET ORAL
Qty: 180 TAB | Refills: 1 | Status: SHIPPED | OUTPATIENT
Start: 2020-10-05 | End: 2020-12-24 | Stop reason: SDUPTHER

## 2020-10-05 RX ORDER — IBUPROFEN 200 MG
1 TABLET ORAL EVERY 24 HOURS
Qty: 30 PATCH | Refills: 5 | Status: SHIPPED | OUTPATIENT
Start: 2020-10-05 | End: 2020-11-04

## 2020-10-05 RX ORDER — GABAPENTIN 800 MG/1
800 TABLET ORAL 3 TIMES DAILY
Qty: 90 TAB | Refills: 2 | Status: SHIPPED | OUTPATIENT
Start: 2020-10-05 | End: 2020-12-24 | Stop reason: SDUPTHER

## 2020-10-05 NOTE — PROGRESS NOTES
Subjective:     Yasmine Meeks is a 54 y.o. female seen for follow-up of diabetes. She has had hypoglycemic attacks. .unsure. Blood sugar control has been unknown, usually it is been okay  She has diabetes, hypertension and hyperlipidemia. Lab Results   Component Value Date/Time    Hemoglobin A1c 7.2 (H) 07/29/2019 10:55 AM    Hemoglobin A1c 7.8 (H) 01/23/2019 03:39 PM    Hemoglobin A1c 7.7 (H) 03/14/2018 04:22 PM    Glucose 126 (H) 09/04/2019 01:15 PM    Glucose (POC) 133 (H) 09/24/2014 04:43 PM    Microalb/Creat ratio (ug/mg creat.) 1.8 07/29/2019 10:31 AM    LDL, calculated 107 (H) 01/23/2019 03:39 PM    Creatinine 0.67 09/04/2019 01:15 PM       Yasmine Meeks has the additional concern of abdominal pain continues. Has some heartburn symptoms as well. No bleeding, constipation or weight loss. Continues to smoke cigarettes, it settles her nerves. Diabetic Review of Systems: no polyuria or polydipsia, no chest pain, dyspnea or TIA's, has dysesthesias in the feet. Allergies   Allergen Reactions    Codeine Hives       Diet and Lifestyle: follows a diabetic diet regularly, smoker Pack a day. Patient Active Problem List    Diagnosis Date Noted    COPD (chronic obstructive pulmonary disease) ([facility] 11/04/2019    Fatty infiltration of liver 07/29/2019    Pain, chronic 04/22/2019    Type 2 diabetes mellitus with hyperglycemia, without long-term current use of insulin [facility] 05/08/2018    Type 2 diabetes mellitus with diabetic neuropathy [facility] 03/14/2018    S/P laparoscopic hysterectomy 06/24/2014    Hyperlipidemia 02/04/2013    Anxiety 02/04/2013     Allergies   Allergen Reactions    Codeine Hives     Social History     Tobacco Use    Smoking status: Current Every Day Smoker     Packs/day: 1.00     Years: 30.00     Pack years: 30.00    Smokeless tobacco: Current User    Tobacco comment: Vape User   Substance Use Topics    Alcohol use:  No             Review of Systems  Pertinent items are noted in HPI. Objective:     Significant for the following:     Visit Vitals  LMP 06/03/2014     Appearance: alert, well appearing, and in no distress. Exam: Deferred  Lab review: orders written for new lab studies as appropriate; see orders. Assessment/Plan:     Follow-up diabetes control uncertain, needs further observation, needs to quit smoking. Diabetic issues reviewed with her: all medications, side effects and compliance discussed carefully, long term diabetic complications discussed and patient urged in the strongest terms to quit smoking. Patient was instructed on the limits of making a diagnosis at this visit. Was instructed to call us or go to the emergency room if the symptoms increased or if new symptoms appeared. May be its GERD start Pepcid but needs to see GI for further evaluation. Chronic Conditions Addressed Today     1. Anxiety     Relevant Medications     gabapentin (NEURONTIN) 800 mg tablet    2. COPD (chronic obstructive pulmonary disease) ([facility] - Primary     Relevant Medications     nicotine (NICODERM CQ) 21 mg/24 hr    3. Hyperlipidemia     Relevant Orders     LIPID PANEL    4.  Type 2 diabetes mellitus with diabetic neuropathy (HCC)     Relevant Medications     gabapentin (NEURONTIN) 800 mg tablet     metFORMIN (GLUCOPHAGE) 500 mg tablet     Other Relevant Orders     METABOLIC PANEL, COMPREHENSIVE     HEMOGLOBIN A1C WITH EAG      Acute Diagnoses Addressed Today     Neuropathy due to type 2 diabetes mellitus (HCC)            Relevant Medications        gabapentin (NEURONTIN) 800 mg tablet        metFORMIN (GLUCOPHAGE) 500 mg tablet    Encounter for screening mammogram for malignant neoplasm of breast            Relevant Orders        [facility] MAMMO BI SCREENING INCL CAD    Gastroesophageal reflux disease with esophagitis without hemorrhage            Relevant Medications        famotidine (PEPCID) 40 mg tablet        Other Relevant Orders        REFERRAL TO GASTROENTEROLOGY        CBC W/O DIFF    Screening for colon cancer            Relevant Orders        REFERRAL TO Dmitry Aguiar, who was evaluated through a synchronous (real-time) audio-video encounter, and/or her healthcare decision maker, is aware that it is a billable service, with coverage as determined by her insurance carrier. She provided verbal consent to proceed: Yes, and patient identification was verified. It was conducted pursuant to the emergency declaration under the [address] [place] [address] waiver authority and the [employer] General Act. A caregiver was present when appropriate. Ability to conduct physical exam was limited. I was in the office. The patient was at home.         Follow-up 2 months

## 2020-10-05 NOTE — PROGRESS NOTES
Chief Complaint   Patient presents with    Leg Pain     med refill     Health Maintenance reviewed. I have reviewed the patient's medical history in detail and updated the computerized patient record. Patient has not been out of the country in (6-7 months), NO diarrhea, NO cough, NO chest conjestion, NO temp. Pt has not been around anyone with these symptoms. 1. Have you been to the ER, urgent care clinic since your last visit? Hospitalized since your last visit?no    2. Have you seen or consulted any other health care providers outside of the [address] [place] [address] [place] since your last visit? Include any pap smears or colon screening. No      Encouraged pt to discuss pt's wishes with spouse/partner/family and bring them in the next appt to follow thru with the Advanced Directive    Fall Risk Assessment, last 12 mths 10/5/2020   Able to walk? Yes   Fall in past 12 months? No       3 most recent PHQ Screens 10/5/2020   Little interest or pleasure in doing things Nearly every day   Feeling down, depressed, irritable, or hopeless Nearly every day   Total Score PHQ 2 6       Abuse Screening Questionnaire 10/5/2020   Do you ever feel afraid of your partner? N   Are you in a relationship with someone who physically or mentally threatens you? N   Is it safe for you to go home?  Y       ADL Assessment 10/5/2020   Feeding yourself No Help Needed   Getting from bed to chair No Help Needed   Getting dressed No Help Needed   Bathing or showering No Help Needed   Walk across the room (includes cane/walker) No Help Needed   Using the telphone No Help Needed   Taking your medications No Help Needed   Preparing meals No Help Needed   Managing money (expenses/bills) No Help Needed   Moderately strenuous housework (laundry) No Help Needed   Shopping for personal items (toiletries/medicines) No Help Needed   Shopping for groceries No Help Needed   Driving No Help Needed   Climbing a flight of stairs No Help Needed Getting to places beyond walking distances No Help Needed

## 2020-12-23 LAB
ALBUMIN SERPL-MCNC: 4.3 G/DL (ref 3.8–4.9)
ALBUMIN/GLOB SERPL: 1.7 {RATIO} (ref 1.2–2.2)
ALP SERPL-CCNC: 94 IU/L (ref 39–117)
ALT SERPL-CCNC: 40 IU/L (ref 0–32)
AST SERPL-CCNC: 29 IU/L (ref 0–40)
BILIRUB SERPL-MCNC: 0.2 MG/DL (ref 0–1.2)
BUN SERPL-MCNC: 14 MG/DL (ref 6–24)
BUN/CREAT SERPL: 15 (ref 9–23)
CALCIUM SERPL-MCNC: 9.6 MG/DL (ref 8.7–10.2)
CHLORIDE SERPL-SCNC: 97 MMOL/L (ref 96–106)
CHOLEST SERPL-MCNC: 221 MG/DL (ref 100–199)
CO2 SERPL-SCNC: 28 MMOL/L (ref 20–29)
CREAT SERPL-MCNC: 0.92 MG/DL (ref 0.57–1)
EST. AVERAGE GLUCOSE BLD GHB EST-MCNC: 163 MG/DL
GLOBULIN SER CALC-MCNC: 2.6 G/DL (ref 1.5–4.5)
GLUCOSE SERPL-MCNC: 162 MG/DL (ref 65–99)
HBA1C MFR BLD: 7.3 % (ref 4.8–5.6)
HDLC SERPL-MCNC: 39 MG/DL
INTERPRETATION, 910389: NORMAL
LDLC SERPL CALC-MCNC: 139 MG/DL (ref 0–99)
Lab: NORMAL
POTASSIUM SERPL-SCNC: 4.6 MMOL/L (ref 3.5–5.2)
PROT SERPL-MCNC: 6.9 G/DL (ref 6–8.5)
SODIUM SERPL-SCNC: 139 MMOL/L (ref 134–144)
TRIGL SERPL-MCNC: 239 MG/DL (ref 0–149)
VLDLC SERPL CALC-MCNC: 43 MG/DL (ref 5–40)

## 2020-12-24 ENCOUNTER — VIRTUAL VISIT (OUTPATIENT)
Dept: INTERNAL MEDICINE CLINIC | Age: 55
End: 2020-12-24
Payer: COMMERCIAL

## 2020-12-24 DIAGNOSIS — E11.40 NEUROPATHY DUE TO TYPE 2 DIABETES MELLITUS (HCC): ICD-10-CM

## 2020-12-24 DIAGNOSIS — N95.1 HOT FLASHES DUE TO MENOPAUSE: Primary | ICD-10-CM

## 2020-12-24 DIAGNOSIS — E11.40 TYPE 2 DIABETES MELLITUS WITH DIABETIC NEUROPATHY, WITHOUT LONG-TERM CURRENT USE OF INSULIN (HCC): ICD-10-CM

## 2020-12-24 DIAGNOSIS — F32.1 MODERATE MAJOR DEPRESSION (HCC): ICD-10-CM

## 2020-12-24 PROCEDURE — 99214 OFFICE O/P EST MOD 30 MIN: CPT | Performed by: FAMILY MEDICINE

## 2020-12-24 PROCEDURE — 3051F HG A1C>EQUAL 7.0%<8.0%: CPT | Performed by: FAMILY MEDICINE

## 2020-12-24 RX ORDER — FLUOXETINE HYDROCHLORIDE 40 MG/1
40 CAPSULE ORAL 2 TIMES DAILY
Qty: 180 CAP | Refills: 1 | Status: SHIPPED | OUTPATIENT
Start: 2020-12-24 | End: 2021-04-01 | Stop reason: SDUPTHER

## 2020-12-24 RX ORDER — CLONIDINE HYDROCHLORIDE 0.1 MG/1
0.2 TABLET ORAL
Qty: 60 TAB | Refills: 5 | Status: SHIPPED | OUTPATIENT
Start: 2020-12-24 | End: 2021-04-01 | Stop reason: SDUPTHER

## 2020-12-24 RX ORDER — METFORMIN HYDROCHLORIDE 500 MG/1
TABLET ORAL
Qty: 180 TAB | Refills: 1 | Status: SHIPPED | OUTPATIENT
Start: 2020-12-24 | End: 2021-04-01 | Stop reason: SDUPTHER

## 2020-12-24 RX ORDER — GABAPENTIN 800 MG/1
800 TABLET ORAL 3 TIMES DAILY
Qty: 90 TAB | Refills: 2 | Status: SHIPPED | OUTPATIENT
Start: 2020-12-24 | End: 2021-03-26 | Stop reason: SDUPTHER

## 2020-12-24 NOTE — PROGRESS NOTES
Chief Complaint   Patient presents with    Leg Pain     leg and feet pain     Health Maintenance reviewed. I have reviewed the patient's medical history in detail and updated the computerized patient record. Patient has not been out of the country in (8-9 months), NO diarrhea, NO cough, NO chest conjestion, NO temp. Pt has not been around anyone with these symptoms. 1. Have you been to the ER, urgent care clinic since your last visit? Hospitalized since your last visit?no    2. Have you seen or consulted any other health care providers outside of the [address] [place] [address] since your last visit? Include any pap smears or colon screening. No      Encouraged pt to discuss pt's wishes with spouse/partner/family and bring them in the next appt to follow thru with the Advanced Directive    Fall Risk Assessment, last 12 mths 12/24/2020   Able to walk? Yes   Fall in past 12 months? 0   Do you feel unsteady? 0   Are you worried about falling 0       3 most recent PHQ Screens 12/24/2020   Little interest or pleasure in doing things Nearly every day   Feeling down, depressed, irritable, or hopeless Nearly every day   Total Score PHQ 2 6       Abuse Screening Questionnaire 12/24/2020   Do you ever feel afraid of your partner? N   Are you in a relationship with someone who physically or mentally threatens you? N   Is it safe for you to go home?  Y       ADL Assessment 12/24/2020   Feeding yourself No Help Needed   Getting from bed to chair No Help Needed   Getting dressed No Help Needed   Bathing or showering No Help Needed   Walk across the room (includes cane/walker) No Help Needed   Using the telphone No Help Needed   Taking your medications No Help Needed   Preparing meals No Help Needed   Managing money (expenses/bills) No Help Needed   Moderately strenuous housework (laundry) No Help Needed   Shopping for personal items (toiletries/medicines) No Help Needed   Shopping for groceries No Help Needed   Driving No Help Needed   Climbing a flight of stairs No Help Needed   Getting to places beyond walking distances No Help Needed

## 2020-12-24 NOTE — LETTER
12/24/2020 Ms. Lyn Coelho [address] [place] [address] Dear Billmirelamaira Coelho: 
 
Please find your most recent results below. Resulted Orders METABOLIC PANEL, COMPREHENSIVE (Collected: 12/22/2020  3:10 PM) Result Value Ref Range Glucose 162 (H) 65 - 99 mg/dL BUN 14 6 - 24 mg/dL Creatinine 0.92 0.57 - 1.00 mg/dL GFR est non-AA 70 >59 mL/min/1.73 GFR est AA 81 >59 mL/min/1.73  
 BUN/Creatinine ratio 15 9 - 23 Sodium 139 134 - 144 mmol/L Potassium 4.6 3.5 - 5.2 mmol/L Chloride 97 96 - 106 mmol/L  
 CO2 28 20 - 29 mmol/L Calcium 9.6 8.7 - 10.2 mg/dL Protein, total 6.9 6.0 - 8.5 g/dL Albumin 4.3 3.8 - 4.9 g/dL GLOBULIN, TOTAL 2.6 1.5 - 4.5 g/dL A-G Ratio 1.7 1.2 - 2.2 Bilirubin, total 0.2 0.0 - 1.2 mg/dL Alk. phosphatase 94 39 - 117 IU/L  
 AST (SGOT) 29 0 - 40 IU/L  
 ALT (SGPT) 40 (H) 0 - 32 IU/L Narrative Performed at:  [address]  [id number] : Armando Johnson MD, Phone:  [phone] LIPID PANEL (Collected: 12/22/2020  3:10 PM) Result Value Ref Range Cholesterol, total 221 (H) 100 - 199 mg/dL Triglyceride 239 (H) 0 - 149 mg/dL HDL Cholesterol 39 (L) >39 mg/dL VLDL Cholesterol Dawson 43 (H) 5 - 40 mg/dL LDL Chol Calc (NIH) 139 (H) 0 - 99 mg/dL Narrative Performed at:  [address]  [id number] : Armando Johnson MD, Phone:  [phone] HEMOGLOBIN A1C WITH EAG (Collected: 12/22/2020  3:10 PM) Result Value Ref Range Hemoglobin A1c 7.3 (H) 4.8 - 5.6 % Comment:  
            Prediabetes: 5.7 - 6.4 Diabetes: >6.4 Glycemic control for adults with diabetes: <7.0 Estimated average glucose 163 mg/dL Narrative Performed at:  [address]  [id number] : Armando Johnson MD, Phone:  [phone] CVD REPORT (Collected: 12/22/2020  3:10 PM) Result Value Ref Range INTERPRETATION Note Comment:  
   Supplemental report is available. Narrative Performed at:  [address]  [id number] : José Miguel Nagel MD, Phone:  [phone] DIABETES PATIENT EDUCATION (Collected: 12/22/2020  3:10 PM) Result Value Ref Range PDF Image Not applicable Narrative Performed at:  [address]  [id number] : José Miguel Nagel MD, Phone:  [phone] RECOMMENDATIONS: 
Work on diet and exercise. Recheck this test: chol/LFTs  in  6 months. Slightly elevated liver enzyme and cholesterol. A1C is at a good level. Please call me if you have any questions: [phone] Sincerely, Tamika Garza MD

## 2020-12-24 NOTE — PROGRESS NOTES
Subjective:     Elisa Back is a 54 y.o. female seen for follow-up of diabetes. She has had hypoglycemic attacks. .no  Blood sugar control has been good  She has diabetes, hypertension and hyperlipidemia. Elisa Back has the additional concern of feels well    Lab Results   Component Value Date/Time    Hemoglobin A1c 7.3 (H) 12/22/2020 03:10 PM    Hemoglobin A1c 7.2 (H) 07/29/2019 10:55 AM    Hemoglobin A1c 7.8 (H) 01/23/2019 03:39 PM    Glucose 162 (H) 12/22/2020 03:10 PM    Glucose (POC) 133 (H) 09/24/2014 04:43 PM    Microalb/Creat ratio (ug/mg creat.) 1.8 07/29/2019 10:31 AM    LDL, calculated 139 (H) 12/22/2020 03:10 PM    LDL, calculated 107 (H) 01/23/2019 03:39 PM    Creatinine 0.92 12/22/2020 03:10 PM         Diabetic Review of Systems: no polyuria or polydipsia, no chest pain, dyspnea or TIA's, no hypoglycemia, has dysesthesias in the feet. Allergies   Allergen Reactions    Codeine Hives       Diet and Lifestyle: does not rigorously follow a diabetic diet, smoker 0.5 pack per day. Patient Active Problem List    Diagnosis Date Noted    COPD (chronic obstructive pulmonary disease) ([facility] 11/04/2019    Fatty infiltration of liver 07/29/2019    Pain, chronic 04/22/2019    Type 2 diabetes mellitus with hyperglycemia, without long-term current use of insulin [facility] 05/08/2018    Type 2 diabetes mellitus with diabetic neuropathy ([facility] 03/14/2018    S/P laparoscopic hysterectomy 06/24/2014    Hyperlipidemia 02/04/2013    Anxiety 02/04/2013     Current Outpatient Medications   Medication Sig Dispense Refill    gabapentin (NEURONTIN) 800 mg tablet Take 1 Tab by mouth three (3) times daily. Max Daily Amount: 2,400 mg. Indications: neuropathic pain 90 Tab 2    famotidine (PEPCID) 40 mg tablet Take 1 Tab by mouth daily.  30 Tab 5    metFORMIN (GLUCOPHAGE) 500 mg tablet TAKE 1 TABLET BY MOUTH TWICE A DAY WITH MEALS 180 Tab 1    furosemide (LASIX) 20 mg tablet Daily as needed for swelling 60 Tab 1    cyclobenzaprine (FLEXERIL) 10 mg tablet Take 1 Tab by mouth three (3) times daily as needed for Muscle Spasm(s). 60 Tab 2    FLUoxetine (PROzac) 40 mg capsule Take 1 Cap by mouth two (2) times a day. 180 Cap 1    traZODone (DESYREL) 100 mg tablet Take 1 Tab by mouth nightly. As needed 30 Tab 2    cloNIDine HCL (CATAPRES) 0.1 mg tablet Take 2 Tabs by mouth nightly. Start 1 at night increase as tolerated 60 Tab 5    albuterol (PROVENTIL HFA, VENTOLIN HFA, PROAIR HFA) 90 mcg/actuation inhaler Take 1 Puff by inhalation every four (4) hours as needed for Wheezing. 1 Inhaler 5    butalbital-acetaminophen-caffeine (FIORICET, ESGIC) -40 mg per tablet Take 1 Tab by mouth every six (6) hours as needed for Pain. 12 Tab 1    buprenorphine-naloxone (SUBOXONE) 8-2 mg film sublingaul film by SubLINGual route. Allergies   Allergen Reactions    Codeine Hives     Social History     Tobacco Use    Smoking status: Current Every Day Smoker     Packs/day: 1.00     Years: 30.00     Pack years: 30.00    Smokeless tobacco: Current User    Tobacco comment: Vape User   Substance Use Topics    Alcohol use: No             Review of Systems  Pertinent items are noted in HPI. Objective:     Significant for the following:     Visit Vitals  LMP 06/03/2014     Appearance: alert, well appearing, and in no distress. Exam:   Foot exam: def    Lab review: labs reviewed, I note that glycosylated hemoglobin mildly abnormal but acceptable. Assessment/Plan:     Follow-up diabetes well controlled, stable. Diabetic issues reviewed with her: all medications, side effects and compliance discussed carefully, glycohemoglobin and other lab monitoring discussed and patient urged in the strongest terms to quit smoking. Chronic Conditions Addressed Today     1.  Type 2 diabetes mellitus with diabetic neuropathy (HCC)     Relevant Medications     FLUoxetine (PROzac) 40 mg capsule     gabapentin (NEURONTIN) 800 mg tablet     metFORMIN (GLUCOPHAGE) 500 mg tablet      Acute Diagnoses Addressed Today     Hot flashes due to menopause    -  Primary        Relevant Medications        cloNIDine HCL (CATAPRES) 0.1 mg tablet        metFORMIN (GLUCOPHAGE) 500 mg tablet    Neuropathy due to type 2 diabetes mellitus (HCC)            Relevant Medications        FLUoxetine (PROzac) 40 mg capsule        gabapentin (NEURONTIN) 800 mg tablet        metFORMIN (GLUCOPHAGE) 500 mg tablet    Moderate major depression (HCC)            Relevant Medications        FLUoxetine (PROzac) 40 mg capsule        gabapentin (NEURONTIN) 800 mg tablet        Depression stable continue Prozac  Hot flashes doing well with clonidine off hormones.       3-month follow-up

## 2021-02-19 ENCOUNTER — IMMUNIZATION (OUTPATIENT)
Dept: PEDIATRICS CLINIC | Age: 56
End: 2021-02-19
Payer: COMMERCIAL

## 2021-02-19 DIAGNOSIS — Z23 ENCOUNTER FOR IMMUNIZATION: Primary | ICD-10-CM

## 2021-02-19 PROCEDURE — 0011A COVID-19, MRNA, LNP-S, PF, 100MCG/0.5ML DOSE(MODERNA): CPT | Performed by: FAMILY MEDICINE

## 2021-02-19 PROCEDURE — 91301 COVID-19, MRNA, LNP-S, PF, 100MCG/0.5ML DOSE(MODERNA): CPT | Performed by: FAMILY MEDICINE

## 2021-03-19 ENCOUNTER — IMMUNIZATION (OUTPATIENT)
Dept: PEDIATRICS CLINIC | Age: 56
End: 2021-03-19
Payer: COMMERCIAL

## 2021-03-19 DIAGNOSIS — Z23 ENCOUNTER FOR IMMUNIZATION: Primary | ICD-10-CM

## 2021-03-19 PROCEDURE — 0012A COVID-19, MRNA, LNP-S, PF, 100MCG/0.5ML DOSE(MODERNA): CPT | Performed by: FAMILY MEDICINE

## 2021-03-19 PROCEDURE — 91301 COVID-19, MRNA, LNP-S, PF, 100MCG/0.5ML DOSE(MODERNA): CPT | Performed by: FAMILY MEDICINE

## 2021-03-26 DIAGNOSIS — E11.40 NEUROPATHY DUE TO TYPE 2 DIABETES MELLITUS (HCC): ICD-10-CM

## 2021-03-26 RX ORDER — GABAPENTIN 800 MG/1
800 TABLET ORAL 3 TIMES DAILY
Qty: 21 TAB | Refills: 0 | Status: SHIPPED | OUTPATIENT
Start: 2021-03-26 | End: 2021-04-01 | Stop reason: SDUPTHER

## 2021-03-26 NOTE — TELEPHONE ENCOUNTER
Patient med refill request      Requested Prescriptions     Pending Prescriptions Disp Refills    gabapentin (NEURONTIN) 800 mg tablet 90 Tab 2     Sig: Take 1 Tab by mouth three (3) times daily. Max Daily Amount: 2,400 mg.  Indications: neuropathic pain

## 2021-04-01 ENCOUNTER — VIRTUAL VISIT (OUTPATIENT)
Dept: INTERNAL MEDICINE CLINIC | Age: 56
End: 2021-04-01
Payer: COMMERCIAL

## 2021-04-01 DIAGNOSIS — E11.65 TYPE 2 DIABETES MELLITUS WITH HYPERGLYCEMIA, WITHOUT LONG-TERM CURRENT USE OF INSULIN (HCC): ICD-10-CM

## 2021-04-01 DIAGNOSIS — G43.901 MIGRAINE WITH STATUS MIGRAINOSUS, NOT INTRACTABLE, UNSPECIFIED MIGRAINE TYPE: ICD-10-CM

## 2021-04-01 DIAGNOSIS — E11.40 TYPE 2 DIABETES MELLITUS WITH DIABETIC NEUROPATHY, WITHOUT LONG-TERM CURRENT USE OF INSULIN (HCC): Primary | ICD-10-CM

## 2021-04-01 DIAGNOSIS — E78.5 HYPERLIPIDEMIA, UNSPECIFIED HYPERLIPIDEMIA TYPE: ICD-10-CM

## 2021-04-01 DIAGNOSIS — E11.40 NEUROPATHY DUE TO TYPE 2 DIABETES MELLITUS (HCC): ICD-10-CM

## 2021-04-01 DIAGNOSIS — N95.1 HOT FLASHES DUE TO MENOPAUSE: ICD-10-CM

## 2021-04-01 DIAGNOSIS — Z12.11 SCREENING FOR COLON CANCER: ICD-10-CM

## 2021-04-01 DIAGNOSIS — J44.1 COPD EXACERBATION (HCC): ICD-10-CM

## 2021-04-01 DIAGNOSIS — F32.1 MODERATE MAJOR DEPRESSION (HCC): ICD-10-CM

## 2021-04-01 DIAGNOSIS — Z23 ENCOUNTER FOR IMMUNIZATION: ICD-10-CM

## 2021-04-01 DIAGNOSIS — G47.00 INSOMNIA, UNSPECIFIED TYPE: ICD-10-CM

## 2021-04-01 PROCEDURE — 99214 OFFICE O/P EST MOD 30 MIN: CPT | Performed by: FAMILY MEDICINE

## 2021-04-01 RX ORDER — GABAPENTIN 800 MG/1
800 TABLET ORAL 3 TIMES DAILY
Qty: 90 TAB | Refills: 2 | Status: SHIPPED | OUTPATIENT
Start: 2021-04-01 | End: 2021-07-06 | Stop reason: SDUPTHER

## 2021-04-01 RX ORDER — CLONIDINE HYDROCHLORIDE 0.1 MG/1
0.2 TABLET ORAL
Qty: 180 TAB | Refills: 1 | Status: SHIPPED | OUTPATIENT
Start: 2021-04-01 | End: 2021-07-14 | Stop reason: SDUPTHER

## 2021-04-01 RX ORDER — METFORMIN HYDROCHLORIDE 500 MG/1
TABLET ORAL
Qty: 180 TAB | Refills: 3 | Status: SHIPPED | OUTPATIENT
Start: 2021-04-01 | End: 2022-02-07 | Stop reason: SDUPTHER

## 2021-04-01 RX ORDER — TRAZODONE HYDROCHLORIDE 100 MG/1
100 TABLET ORAL
Qty: 90 TAB | Refills: 1 | Status: SHIPPED | OUTPATIENT
Start: 2021-04-01 | End: 2021-07-14 | Stop reason: SDUPTHER

## 2021-04-01 RX ORDER — FUROSEMIDE 20 MG/1
TABLET ORAL
Qty: 90 TAB | Refills: 1 | Status: SHIPPED | OUTPATIENT
Start: 2021-04-01 | End: 2021-07-14 | Stop reason: SDUPTHER

## 2021-04-01 RX ORDER — BUTALBITAL, ACETAMINOPHEN AND CAFFEINE 50; 325; 40 MG/1; MG/1; MG/1
1 TABLET ORAL
Qty: 12 TAB | Refills: 1 | Status: SHIPPED | OUTPATIENT
Start: 2021-04-01 | End: 2022-05-19 | Stop reason: ALTCHOICE

## 2021-04-01 RX ORDER — FAMOTIDINE 40 MG/1
40 TABLET, FILM COATED ORAL DAILY
Qty: 90 TAB | Refills: 1 | Status: SHIPPED | OUTPATIENT
Start: 2021-04-01 | End: 2021-07-14 | Stop reason: SDUPTHER

## 2021-04-01 RX ORDER — FLUOXETINE HYDROCHLORIDE 40 MG/1
40 CAPSULE ORAL 2 TIMES DAILY
Qty: 180 CAP | Refills: 1 | Status: SHIPPED | OUTPATIENT
Start: 2021-04-01 | End: 2021-07-14 | Stop reason: SDUPTHER

## 2021-04-01 NOTE — PROGRESS NOTES
Chief Complaint   Patient presents with    Medication Refill     Patient is aware that this is a Virtual Visit or Phone Call Only doctor's visit. Patient has not been out of the country in (14 months), NO diarrhea, NO cough, NO chest conjestion, NO temp. Pt has not been around anyone with these symptoms. Health Maintenance reviewed. I have reviewed the patient's medical history in detail and updated the computerized patient record. 1. Have you been to the ER, urgent care clinic since your last visit? Hospitalized since your last visit?no    2. Have you seen or consulted any other health care providers outside of the [address] [place] [address] since your last visit? Include any pap smears or colon screening. no    Encouraged pt to discuss pt's wishes with spouse/partner/family and bring them in the next appt to follow thru with the Advanced Directive      Fall Risk Assessment, last 12 mths 12/24/2020   Able to walk? Yes   Fall in past 12 months? 0   Do you feel unsteady? 0   Are you worried about falling 0       3 most recent PHQ Screens 4/1/2021   Little interest or pleasure in doing things More than half the days   Feeling down, depressed, irritable, or hopeless More than half the days   Total Score PHQ 2 4       Abuse Screening Questionnaire 4/1/2021   Do you ever feel afraid of your partner? N   Are you in a relationship with someone who physically or mentally threatens you? N   Is it safe for you to go home?  Y       ADL Assessment 4/1/2021   Feeding yourself No Help Needed   Getting from bed to chair No Help Needed   Getting dressed No Help Needed   Bathing or showering No Help Needed   Walk across the room (includes cane/walker) No Help Needed   Using the telphone No Help Needed   Taking your medications No Help Needed   Preparing meals No Help Needed   Managing money (expenses/bills) No Help Needed   Moderately strenuous housework (laundry) No Help Needed   Shopping for personal items (toiletries/medicines) No Help Needed   Shopping for groceries No Help Needed   Driving No Help Needed   Climbing a flight of stairs No Help Needed   Getting to places beyond walking distances No Help Needed

## 2021-04-01 NOTE — PROGRESS NOTES
Subjective:     Tonio Wilkins is a 64 y.o. female seen for follow-up of diabetes. She has had hypoglycemic attacks. .no  Blood sugar control has been good    Lab Results   Component Value Date/Time    Hemoglobin A1c 7.3 (H) 12/22/2020 03:10 PM    Hemoglobin A1c 7.2 (H) 07/29/2019 10:55 AM    Hemoglobin A1c 7.8 (H) 01/23/2019 03:39 PM    Glucose 162 (H) 12/22/2020 03:10 PM    Glucose (POC) 133 (H) 09/24/2014 04:43 PM    Microalb/Creat ratio (ug/mg creat.) 1.8 07/29/2019 10:31 AM    LDL, calculated 139 (H) 12/22/2020 03:10 PM    LDL, calculated 107 (H) 01/23/2019 03:39 PM    Creatinine 0.92 12/22/2020 03:10 PM     Needs gabapentin   She has diabetes, hypertension and hyperlipidemia. Tonio Wilkins has the additional concern of gabapentin RF    Reports taking blood pressure medications without side affects. No complaints of exertional chest pain, excessive shortness of breath or focal weakness. Minimal swelling in lower legs or dizziness with standing. Diet and Lifestyle: generally follows a low fat low cholesterol diet, smoker 0.5 pack daily. Patient Active Problem List    Diagnosis Date Noted    COPD (chronic obstructive pulmonary disease) ([facility] 11/04/2019    Fatty infiltration of liver 07/29/2019    Pain, chronic 04/22/2019    Type 2 diabetes mellitus with hyperglycemia, without long-term current use of insulin ([facility] 05/08/2018    Type 2 diabetes mellitus with diabetic neuropathy ([facility] 03/14/2018    S/P laparoscopic hysterectomy 06/24/2014    Hyperlipidemia 02/04/2013    Anxiety 02/04/2013     Current Outpatient Medications   Medication Sig Dispense Refill    varicella-zoster recombinant, PF, (SHINGRIX) 50 mcg/0.5 mL susr injection 0.5 mL by IntraMUSCular route once for 1 dose. 0.5 mL 0    gabapentin (NEURONTIN) 800 mg tablet Take 1 Tab by mouth three (3) times daily for 7 days. Max Daily Amount: 2,400 mg.  Indications: neuropathic pain 21 Tab 0    FLUoxetine (PROzac) 40 mg capsule Take 1 Cap by mouth two (2) times a day. 180 Cap 1    cloNIDine HCL (CATAPRES) 0.1 mg tablet Take 2 Tabs by mouth nightly. Start 1 at night increase as tolerated 60 Tab 5    metFORMIN (GLUCOPHAGE) 500 mg tablet TAKE 1 TABLET BY MOUTH TWICE A DAY WITH MEALS 180 Tab 1    famotidine (PEPCID) 40 mg tablet Take 1 Tab by mouth daily. 30 Tab 5    furosemide (LASIX) 20 mg tablet Daily as needed for swelling 60 Tab 1    cyclobenzaprine (FLEXERIL) 10 mg tablet Take 1 Tab by mouth three (3) times daily as needed for Muscle Spasm(s). 60 Tab 2    traZODone (DESYREL) 100 mg tablet Take 1 Tab by mouth nightly. As needed 30 Tab 2    albuterol (PROVENTIL HFA, VENTOLIN HFA, PROAIR HFA) 90 mcg/actuation inhaler Take 1 Puff by inhalation every four (4) hours as needed for Wheezing. 1 Inhaler 5    butalbital-acetaminophen-caffeine (FIORICET, ESGIC) -40 mg per tablet Take 1 Tab by mouth every six (6) hours as needed for Pain. 12 Tab 1    buprenorphine-naloxone (SUBOXONE) 8-2 mg film sublingaul film by SubLINGual route. Allergies   Allergen Reactions    Codeine Hives     Past Medical History:   Diagnosis Date    Back pain     Degenerative disc disease     Diabetes ([address]     ; glucose runs 200-300 at home    GERD (gastroesophageal reflux disease)     none recently, takes nexium prn only    Hypercholesteremia     Hypertension     Menopause     Psychiatric disorder     Anxiety; did not take xanax this am     Social History     Tobacco Use    Smoking status: Current Every Day Smoker     Packs/day: 1.00     Years: 30.00     Pack years: 30.00    Smokeless tobacco: Current User    Tobacco comment: Vape User   Substance Use Topics    Alcohol use: No             Review of Systems  Pertinent items are noted in HPI. Objective:     Significant for the following:  WNL  Visit Vitals  LMP 06/03/2014     WD WN female NAD  Sounds NAD looks tired    Lab review: labs reviewed, I note that glycosylated hemoglobin mildly abnormal but acceptable. Assessment/Plan:     Follow-up diabetes well controlled, stable. Diabetic issues reviewed with her: all medications, side effects and compliance discussed carefully, glycohemoglobin and other lab monitoring discussed, patient urged in the strongest terms to quit smoking and labs immediately prior to next visit. ICD-10-CM ICD-9-CM    1. Type 2 diabetes mellitus with diabetic neuropathy, without long-term current use of insulin ([facility])  E11.40 250.60      357.2    2. Screening for colon cancer  Z12.11 V76.51 REFERRAL TO GASTROENTEROLOGY   3. Encounter for immunization  Z23 V03.89 varicella-zoster recombinant, PF, (SHINGRIX) 50 mcg/0.5 mL susr injection   4. Hyperlipidemia, unspecified hyperlipidemia type  E78.5 272.4    5. Type 2 diabetes mellitus with hyperglycemia, without long-term current use of insulin ([facility])  E18.42 028.81 METABOLIC PANEL, BASIC     790.29 HEMOGLOBIN A1C WITH EAG      MICROALBUMIN, UR, RAND W/ MICROALB/CREAT RATIO             Chronic Conditions Addressed Today     1. Hyperlipidemia    2. Type 2 diabetes mellitus with diabetic neuropathy ([facility]) - Primary     Relevant Medications     FLUoxetine (PROzac) 40 mg capsule     metFORMIN (GLUCOPHAGE) 500 mg tablet     traZODone (DESYREL) 100 mg tablet     butalbital-acetaminophen-caffeine (FIORICET, ESGIC) -40 mg per tablet     gabapentin (NEURONTIN) 800 mg tablet    3.  Type 2 diabetes mellitus with hyperglycemia, without long-term current use of insulin ([facility])     Relevant Medications     metFORMIN (GLUCOPHAGE) 500 mg tablet     gabapentin (NEURONTIN) 800 mg tablet     Other Relevant Orders     METABOLIC PANEL, BASIC     HEMOGLOBIN A1C WITH EAG     MICROALBUMIN, UR, RAND W/ MICROALB/CREAT RATIO      Acute Diagnoses Addressed Today     Screening for colon cancer            Relevant Orders        REFERRAL TO GASTROENTEROLOGY    Encounter for immunization            Relevant Medications        varicella-zoster recombinant, PF, (SHINGRIX) 50 mcg/0.5 mL susr injection    Moderate major depression (HCC)            Relevant Medications        FLUoxetine (PROzac) 40 mg capsule        traZODone (DESYREL) 100 mg tablet        butalbital-acetaminophen-caffeine (FIORICET, ESGIC) -40 mg per tablet        gabapentin (NEURONTIN) 800 mg tablet    Hot flashes due to menopause            Relevant Medications        cloNIDine HCL (CATAPRES) 0.1 mg tablet        metFORMIN (GLUCOPHAGE) 500 mg tablet    Insomnia, unspecified type            Relevant Medications        traZODone (DESYREL) 100 mg tablet    Migraine with status migrainosus, not intractable, unspecified migraine type            Relevant Medications        FLUoxetine (PROzac) 40 mg capsule        traZODone (DESYREL) 100 mg tablet        butalbital-acetaminophen-caffeine (FIORICET, ESGIC) -40 mg per tablet        gabapentin (NEURONTIN) 800 mg tablet    COPD exacerbation (HCC)        Neuropathy due to type 2 diabetes mellitus (HCC)            Relevant Medications        FLUoxetine (PROzac) 40 mg capsule        metFORMIN (GLUCOPHAGE) 500 mg tablet        traZODone (DESYREL) 100 mg tablet        butalbital-acetaminophen-caffeine (FIORICET, ESGIC) -40 mg per tablet        gabapentin (NEURONTIN) 800 mg tablet          Orders Placed This Encounter    METABOLIC PANEL, BASIC     Standing Status:   Future     Standing Expiration Date:   10/2/2021    HEMOGLOBIN A1C WITH EAG     Standing Status:   Future     Standing Expiration Date:   4/1/2022    MICROALBUMIN, UR, RAND W/ MICROALB/CREAT RATIO     Standing Status:   Future     Standing Expiration Date:   4/1/2022    REFERRAL TO GASTROENTEROLOGY     Referral Priority:   Routine     Referral Type:   Consultation     Referral Reason:   Specialty Services Required     Referral Location:   [facility]     Referred to Provider:   Donaldo Chavez MD    varicella-zoster recombinant, PF, [facility]) 50 mcg/0.5 mL susr injection     Si.5 mL by IntraMUSCular route once for 1 dose. Dispense:  0.5 mL     Refill:  0    FLUoxetine (PROzac) 40 mg capsule     Sig: Take 1 Cap by mouth two (2) times a day. Dispense:  180 Cap     Refill:  1    cloNIDine HCL (CATAPRES) 0.1 mg tablet     Sig: Take 2 Tabs by mouth nightly. Start 1 at night increase as tolerated     Dispense:  180 Tab     Refill:  1    metFORMIN (GLUCOPHAGE) 500 mg tablet     Sig: TAKE 1 TABLET BY MOUTH TWICE A DAY WITH MEALS     Dispense:  180 Tab     Refill:  3    famotidine (PEPCID) 40 mg tablet     Sig: Take 1 Tab by mouth daily. Dispense:  90 Tab     Refill:  1    furosemide (LASIX) 20 mg tablet     Sig: Daily as needed for swelling     Dispense:  90 Tab     Refill:  1    traZODone (DESYREL) 100 mg tablet     Sig: Take 1 Tab by mouth nightly. As needed     Dispense:  90 Tab     Refill:  1    butalbital-acetaminophen-caffeine (FIORICET, ESGIC) -40 mg per tablet     Sig: Take 1 Tab by mouth every six (6) hours as needed for Headache. Dispense:  12 Tab     Refill:  1    gabapentin (NEURONTIN) 800 mg tablet     Sig: Take 1 Tab by mouth three (3) times daily. Max Daily Amount: 2,400 mg. Indications: neuropathic pain     Dispense:  90 Tab     Refill:  2       Current Outpatient Medications   Medication Sig Dispense Refill    varicella-zoster recombinant, PF, (SHINGRIX) 50 mcg/0.5 mL susr injection 0.5 mL by IntraMUSCular route once for 1 dose. 0.5 mL 0    FLUoxetine (PROzac) 40 mg capsule Take 1 Cap by mouth two (2) times a day. 180 Cap 1    cloNIDine HCL (CATAPRES) 0.1 mg tablet Take 2 Tabs by mouth nightly. Start 1 at night increase as tolerated 180 Tab 1    metFORMIN (GLUCOPHAGE) 500 mg tablet TAKE 1 TABLET BY MOUTH TWICE A DAY WITH MEALS 180 Tab 3    famotidine (PEPCID) 40 mg tablet Take 1 Tab by mouth daily.  90 Tab 1    furosemide (LASIX) 20 mg tablet Daily as needed for swelling 90 Tab 1    traZODone (DESYREL) 100 mg tablet Take 1 Tab by mouth nightly. As needed 90 Tab 1    butalbital-acetaminophen-caffeine (FIORICET, ESGIC) -40 mg per tablet Take 1 Tab by mouth every six (6) hours as needed for Headache. 12 Tab 1    gabapentin (NEURONTIN) 800 mg tablet Take 1 Tab by mouth three (3) times daily. Max Daily Amount: 2,400 mg. Indications: neuropathic pain 90 Tab 2    cyclobenzaprine (FLEXERIL) 10 mg tablet Take 1 Tab by mouth three (3) times daily as needed for Muscle Spasm(s). 60 Tab 2    albuterol (PROVENTIL HFA, VENTOLIN HFA, PROAIR HFA) 90 mcg/actuation inhaler Take 1 Puff by inhalation every four (4) hours as needed for Wheezing. 1 Inhaler 5    buprenorphine-naloxone (SUBOXONE) 8-2 mg film sublingaul film by SubLINGual route. F/up 4mo    Fabule, who was evaluated through a synchronous (real-time) audio-video encounter, and/or her healthcare decision maker, is aware that it is a billable service, with coverage as determined by her insurance carrier. She provided verbal consent to proceed: Yes, and patient identification was verified. This visit was conducted pursuant to the emergency declaration under the [facility], [address] [place] [address] authority and the [employer] Act. A caregiver was present when appropriate. Ability to conduct physical exam was limited. The patient was located in a state where the provider was credentialed to provide care.      --Pasquale Scherer MD on 4/1/2021 at 9:19 AM

## 2021-04-01 NOTE — LETTER
4/1/2021 9:23 AM 
 
Ms. Nicholson [address] [address] Dear Ms. Brayden: Enclosed you will find your next Virtual Visit appointment for August 5, 2021 @9:15AM, lab orders, RX for mTraks and referral.  Please do not hesitate to contact this office if you have any questions. Sincerely, Betty Johnson MD

## 2021-07-14 ENCOUNTER — OFFICE VISIT (OUTPATIENT)
Dept: INTERNAL MEDICINE CLINIC | Age: 56
End: 2021-07-14

## 2021-07-14 VITALS
DIASTOLIC BLOOD PRESSURE: 85 MMHG | BODY MASS INDEX: 25.07 KG/M2 | HEART RATE: 83 BPM | HEIGHT: 66 IN | RESPIRATION RATE: 16 BRPM | WEIGHT: 156 LBS | TEMPERATURE: 98 F | OXYGEN SATURATION: 98 % | SYSTOLIC BLOOD PRESSURE: 131 MMHG

## 2021-07-14 DIAGNOSIS — G43.901 MIGRAINE WITH STATUS MIGRAINOSUS, NOT INTRACTABLE, UNSPECIFIED MIGRAINE TYPE: ICD-10-CM

## 2021-07-14 DIAGNOSIS — J44.9 CHRONIC OBSTRUCTIVE PULMONARY DISEASE, UNSPECIFIED COPD TYPE (HCC): ICD-10-CM

## 2021-07-14 DIAGNOSIS — E11.40 NEUROPATHY DUE TO TYPE 2 DIABETES MELLITUS (HCC): ICD-10-CM

## 2021-07-14 DIAGNOSIS — E11.40 TYPE 2 DIABETES MELLITUS WITH DIABETIC NEUROPATHY, WITHOUT LONG-TERM CURRENT USE OF INSULIN (HCC): ICD-10-CM

## 2021-07-14 DIAGNOSIS — Z12.11 SCREENING FOR COLON CANCER: ICD-10-CM

## 2021-07-14 DIAGNOSIS — E78.2 MIXED HYPERLIPIDEMIA: ICD-10-CM

## 2021-07-14 DIAGNOSIS — F32.1 MODERATE MAJOR DEPRESSION (HCC): ICD-10-CM

## 2021-07-14 DIAGNOSIS — N95.1 HOT FLASHES DUE TO MENOPAUSE: ICD-10-CM

## 2021-07-14 DIAGNOSIS — G47.00 INSOMNIA, UNSPECIFIED TYPE: ICD-10-CM

## 2021-07-14 DIAGNOSIS — E11.65 TYPE 2 DIABETES MELLITUS WITH HYPERGLYCEMIA, WITHOUT LONG-TERM CURRENT USE OF INSULIN (HCC): ICD-10-CM

## 2021-07-14 DIAGNOSIS — M17.10 ARTHRITIS OF KNEE: Primary | ICD-10-CM

## 2021-07-14 PROCEDURE — 99214 OFFICE O/P EST MOD 30 MIN: CPT | Performed by: FAMILY MEDICINE

## 2021-07-14 RX ORDER — CLONIDINE HYDROCHLORIDE 0.1 MG/1
0.2 TABLET ORAL
Qty: 180 TABLET | Refills: 1 | Status: SHIPPED | OUTPATIENT
Start: 2021-07-14 | End: 2022-05-19 | Stop reason: SDUPTHER

## 2021-07-14 RX ORDER — FLUOXETINE HYDROCHLORIDE 40 MG/1
40 CAPSULE ORAL 2 TIMES DAILY
Qty: 180 CAPSULE | Refills: 1 | Status: SHIPPED | OUTPATIENT
Start: 2021-07-14 | End: 2022-05-19 | Stop reason: ALTCHOICE

## 2021-07-14 RX ORDER — TRAZODONE HYDROCHLORIDE 100 MG/1
100 TABLET ORAL
Qty: 90 TABLET | Refills: 1 | Status: SHIPPED | OUTPATIENT
Start: 2021-07-14 | End: 2022-02-07 | Stop reason: SDUPTHER

## 2021-07-14 RX ORDER — GABAPENTIN 800 MG/1
800 TABLET ORAL 3 TIMES DAILY
Qty: 90 TABLET | Refills: 2 | Status: SHIPPED | OUTPATIENT
Start: 2021-07-14 | End: 2022-02-07 | Stop reason: SDUPTHER

## 2021-07-14 RX ORDER — FUROSEMIDE 20 MG/1
TABLET ORAL
Qty: 90 TABLET | Refills: 1 | Status: SHIPPED | OUTPATIENT
Start: 2021-07-14

## 2021-07-14 RX ORDER — DICLOFENAC SODIUM 10 MG/G
GEL TOPICAL 4 TIMES DAILY
Qty: 1 EACH | Refills: 5 | Status: SHIPPED | OUTPATIENT
Start: 2021-07-14 | End: 2022-02-07 | Stop reason: SDUPTHER

## 2021-07-14 RX ORDER — FAMOTIDINE 40 MG/1
40 TABLET, FILM COATED ORAL DAILY
Qty: 90 TABLET | Refills: 1 | Status: SHIPPED | OUTPATIENT
Start: 2021-07-14 | End: 2022-02-07 | Stop reason: SDUPTHER

## 2021-07-14 NOTE — PROGRESS NOTES
Subjective:     Maria Guadalupe Botello is a 64 y.o. female seen for follow-up of diabetes. She has had hypoglycemic attacks. .no  Blood sugar control has been okay  Both knee hurt x years, no specific injury recently  Takes her naltrexone does not help that pain    Lab Results   Component Value Date/Time    Hemoglobin A1c 7.3 (H) 12/22/2020 03:10 PM    Hemoglobin A1c 7.2 (H) 07/29/2019 10:55 AM    Hemoglobin A1c 7.8 (H) 01/23/2019 03:39 PM    Glucose 162 (H) 12/22/2020 03:10 PM    Glucose (POC) 133 (H) 09/24/2014 04:43 PM    Microalb/Creat ratio (ug/mg creat.) 1.8 07/29/2019 10:31 AM    LDL, calculated 139 (H) 12/22/2020 03:10 PM    LDL, calculated 107 (H) 01/23/2019 03:39 PM    Creatinine 0.92 12/22/2020 03:10 PM       She has diabetes. Maria Guadalupe Botello has the additional concern of as above    Reports taking blood pressure medications without side affects. No complaints of exertional chest pain, excessive shortness of breath or focal weakness. Minimal swelling in lower legs or dizziness with standing. Diet and Lifestyle: smoker Few daily.     Patient Active Problem List    Diagnosis Date Noted    COPD (chronic obstructive pulmonary disease) ([address] 11/04/2019    Fatty infiltration of liver 07/29/2019    Pain, chronic 04/22/2019    Type 2 diabetes mellitus with hyperglycemia, without long-term current use of insulin ([address] 05/08/2018    Type 2 diabetes mellitus with diabetic neuropathy ([address] 03/14/2018    S/P laparoscopic hysterectomy 06/24/2014    Hyperlipidemia 02/04/2013    Anxiety 02/04/2013     Allergies   Allergen Reactions    Codeine Hives     Past Medical History:   Diagnosis Date    Back pain     Degenerative disc disease     Diabetes ([address]     ; glucose runs 200-300 at home    GERD (gastroesophageal reflux disease)     none recently, takes nexium prn only    Hypercholesteremia     Hypertension     Menopause     Psychiatric disorder     Anxiety; did not take xanax this am Social History     Tobacco Use    Smoking status: Current Every Day Smoker     Packs/day: 1.00     Years: 30.00     Pack years: 30.00    Smokeless tobacco: Current User    Tobacco comment: Vape User   Substance Use Topics    Alcohol use: No             Review of Systems  Pertinent items are noted in HPI. Objective:     Significant for the following: Within normal limits  Visit Vitals  /85 (BP 1 Location: Right arm, BP Patient Position: Sitting, BP Cuff Size: Adult)   Pulse 83   Temp 98 °F (36.7 °C) (Oral)   Resp 16   Ht 5' 5.5\" (1.664 m)   Wt 156 lb (70.8 kg)   LMP 06/03/2014   SpO2 98%   BMI 25.56 kg/m²     WD WN female NAD  Heart RRR without murmers clicks or rubs  Lungs CTA  Abdo soft nontender  Ext no edema      Lab review: labs reviewed, I note that glycosylated hemoglobin mildly abnormal but acceptable. Assessment/Plan:     Follow-up diabetes stable. Diabetic issues reviewed with her: all medications, side effects and compliance discussed carefully, glycohemoglobin and other lab monitoring discussed, patient urged in the strongest terms to quit smoking and labs immediately prior to next visit. Chronic Conditions Addressed Today     1. Type 2 diabetes mellitus with hyperglycemia, without long-term current use of insulin (HCC)     Relevant Medications     gabapentin (NEURONTIN) 800 mg tablet     Other Relevant Orders     METABOLIC PANEL, COMPREHENSIVE     HEMOGLOBIN A1C WITH EAG    2. Type 2 diabetes mellitus with diabetic neuropathy (HCC)     Relevant Medications     gabapentin (NEURONTIN) 800 mg tablet     FLUoxetine (PROzac) 40 mg capsule     traZODone (DESYREL) 100 mg tablet    3. Hyperlipidemia    4.  COPD (chronic obstructive pulmonary disease) (HCC)      Acute Diagnoses Addressed Today     Arthritis of knee    -  Primary        Relevant Medications        diclofenac (VOLTAREN) 1 % gel    Screening for colon cancer            Relevant Orders        REFERRAL TO GASTROENTEROLOGY    Neuropathy due to type 2 diabetes mellitus (HCC)            Relevant Medications        gabapentin (NEURONTIN) 800 mg tablet        FLUoxetine (PROzac) 40 mg capsule        traZODone (DESYREL) 100 mg tablet    Moderate major depression (HCC)            Relevant Medications        gabapentin (NEURONTIN) 800 mg tablet        FLUoxetine (PROzac) 40 mg capsule        traZODone (DESYREL) 100 mg tablet    Hot flashes due to menopause            Relevant Medications        cloNIDine HCL (CATAPRES) 0.1 mg tablet    Insomnia, unspecified type            Relevant Medications        traZODone (DESYREL) 100 mg tablet    Migraine with status migrainosus, not intractable, unspecified migraine type            Relevant Medications        gabapentin (NEURONTIN) 800 mg tablet        FLUoxetine (PROzac) 40 mg capsule        traZODone (DESYREL) 100 mg tablet          Orders Placed This Encounter    METABOLIC PANEL, COMPREHENSIVE     Standing Status:   Future     Standing Expiration Date:   1/14/2022    HEMOGLOBIN A1C WITH EAG     Standing Status:   Future     Standing Expiration Date:   7/14/2022    REFERRAL TO GASTROENTEROLOGY     Referral Priority:   Routine     Referral Type:   Consultation     Referral Reason:   Specialty Services Required     Referred to Provider:   Marvel Walker MD     Number of Visits Requested:   1    diclofenac (VOLTAREN) 1 % gel     Sig: Apply  to affected area four (4) times daily. Dispense:  1 Each     Refill:  5    gabapentin (NEURONTIN) 800 mg tablet     Sig: Take 1 Tablet by mouth three (3) times daily. Max Daily Amount: 2,400 mg. Indications: neuropathic pain     Dispense:  90 Tablet     Refill:  2    FLUoxetine (PROzac) 40 mg capsule     Sig: Take 1 Capsule by mouth two (2) times a day. Dispense:  180 Capsule     Refill:  1    cloNIDine HCL (CATAPRES) 0.1 mg tablet     Sig: Take 2 Tablets by mouth nightly.  Start 1 at night increase as tolerated     Dispense:  180 Tablet     Refill:  1    famotidine (PEPCID) 40 mg tablet     Sig: Take 1 Tablet by mouth daily. Dispense:  90 Tablet     Refill:  1    furosemide (LASIX) 20 mg tablet     Sig: Daily as needed for swelling     Dispense:  90 Tablet     Refill:  1    traZODone (DESYREL) 100 mg tablet     Sig: Take 1 Tablet by mouth nightly. As needed     Dispense:  90 Tablet     Refill:  1     Current Outpatient Medications   Medication Sig Dispense Refill    diclofenac (VOLTAREN) 1 % gel Apply  to affected area four (4) times daily. 1 Each 5    gabapentin (NEURONTIN) 800 mg tablet Take 1 Tablet by mouth three (3) times daily. Max Daily Amount: 2,400 mg. Indications: neuropathic pain 90 Tablet 2    FLUoxetine (PROzac) 40 mg capsule Take 1 Capsule by mouth two (2) times a day. 180 Capsule 1    cloNIDine HCL (CATAPRES) 0.1 mg tablet Take 2 Tablets by mouth nightly. Start 1 at night increase as tolerated 180 Tablet 1    famotidine (PEPCID) 40 mg tablet Take 1 Tablet by mouth daily. 90 Tablet 1    furosemide (LASIX) 20 mg tablet Daily as needed for swelling 90 Tablet 1    traZODone (DESYREL) 100 mg tablet Take 1 Tablet by mouth nightly. As needed 90 Tablet 1    metFORMIN (GLUCOPHAGE) 500 mg tablet TAKE 1 TABLET BY MOUTH TWICE A DAY WITH MEALS 180 Tab 3    butalbital-acetaminophen-caffeine (FIORICET, ESGIC) -40 mg per tablet Take 1 Tab by mouth every six (6) hours as needed for Headache. 12 Tab 1    cyclobenzaprine (FLEXERIL) 10 mg tablet Take 1 Tab by mouth three (3) times daily as needed for Muscle Spasm(s). 60 Tab 2    albuterol (PROVENTIL HFA, VENTOLIN HFA, PROAIR HFA) 90 mcg/actuation inhaler Take 1 Puff by inhalation every four (4) hours as needed for Wheezing. 1 Inhaler 5    buprenorphine-naloxone (SUBOXONE) 8-2 mg film sublingaul film by SubLINGual route.        Follow-up 4 months

## 2021-07-14 NOTE — PROGRESS NOTES
Chief Complaint   Patient presents with    Knee Pain     Patient has not been out of the country in (14 months), NO diarrhea, NO cough, NO chest conjestion, NO temp. Pt has not been around anyone with these symptoms. Health Maintenance reviewed. I have reviewed the patient's medical history in detail and updated the computerized patient record. 1. Have you been to the ER, urgent care clinic since your last visit? no   Hospitalized since your last visit?  no    2. Have you seen or consulted any other health care providers outside of the [address] [place] [address] since your last visit? no Include any pap smears or colon screening. Encouraged pt to discuss pt's wishes with spouse/partner/family and bring them in the next appt to follow thru with the Advanced Directive    @  [address], last 12 mths 12/24/2020   Able to walk? Yes   Fall in past 12 months? 0   Do you feel unsteady? 0   Are you worried about falling 0       3 most recent PHQ Screens 7/14/2021   Little interest or pleasure in doing things Several days   Feeling down, depressed, irritable, or hopeless Several days   Total Score PHQ 2 2       Abuse Screening Questionnaire 7/14/2021   Do you ever feel afraid of your partner? N   Are you in a relationship with someone who physically or mentally threatens you? N   Is it safe for you to go home?  Y       ADL Assessment 7/14/2021   Feeding yourself No Help Needed   Getting from bed to chair No Help Needed   Getting dressed No Help Needed   Bathing or showering No Help Needed   Walk across the room (includes cane/walker) No Help Needed   Using the telphone No Help Needed   Taking your medications No Help Needed   Preparing meals No Help Needed   Managing money (expenses/bills) No Help Needed   Moderately strenuous housework (laundry) No Help Needed   Shopping for personal items (toiletries/medicines) No Help Needed   Shopping for groceries No Help Needed   Driving No Help Needed   Climbing a flight of stairs No Help Needed   Getting to places beyond walking distances No Help Needed

## 2022-02-07 ENCOUNTER — OFFICE VISIT (OUTPATIENT)
Dept: INTERNAL MEDICINE CLINIC | Age: 57
End: 2022-02-07
Payer: COMMERCIAL

## 2022-02-07 VITALS
TEMPERATURE: 98.6 F | SYSTOLIC BLOOD PRESSURE: 113 MMHG | HEART RATE: 82 BPM | DIASTOLIC BLOOD PRESSURE: 63 MMHG | HEIGHT: 66 IN | BODY MASS INDEX: 25.23 KG/M2 | RESPIRATION RATE: 16 BRPM | WEIGHT: 157 LBS | OXYGEN SATURATION: 96 %

## 2022-02-07 DIAGNOSIS — E11.40 TYPE 2 DIABETES MELLITUS WITH DIABETIC NEUROPATHY, WITHOUT LONG-TERM CURRENT USE OF INSULIN (HCC): Primary | ICD-10-CM

## 2022-02-07 DIAGNOSIS — G89.29 CHRONIC RIGHT SHOULDER PAIN: ICD-10-CM

## 2022-02-07 DIAGNOSIS — Z63.6 CAREGIVER BURDEN: ICD-10-CM

## 2022-02-07 DIAGNOSIS — G47.00 INSOMNIA, UNSPECIFIED TYPE: ICD-10-CM

## 2022-02-07 DIAGNOSIS — M79.645 CHRONIC PAIN OF LEFT THUMB: ICD-10-CM

## 2022-02-07 DIAGNOSIS — G89.29 CHRONIC PAIN OF LEFT THUMB: ICD-10-CM

## 2022-02-07 DIAGNOSIS — G43.901 MIGRAINE WITH STATUS MIGRAINOSUS, NOT INTRACTABLE, UNSPECIFIED MIGRAINE TYPE: ICD-10-CM

## 2022-02-07 DIAGNOSIS — J44.9 CHRONIC OBSTRUCTIVE PULMONARY DISEASE, UNSPECIFIED COPD TYPE (HCC): ICD-10-CM

## 2022-02-07 DIAGNOSIS — F41.9 ANXIETY: ICD-10-CM

## 2022-02-07 DIAGNOSIS — K21.9 GASTROESOPHAGEAL REFLUX DISEASE, UNSPECIFIED WHETHER ESOPHAGITIS PRESENT: ICD-10-CM

## 2022-02-07 DIAGNOSIS — E78.2 HYPERLIPEMIA, MIXED: ICD-10-CM

## 2022-02-07 DIAGNOSIS — Z12.11 SCREEN FOR COLON CANCER: ICD-10-CM

## 2022-02-07 DIAGNOSIS — E11.40 NEUROPATHY DUE TO TYPE 2 DIABETES MELLITUS (HCC): ICD-10-CM

## 2022-02-07 DIAGNOSIS — M25.511 CHRONIC RIGHT SHOULDER PAIN: ICD-10-CM

## 2022-02-07 DIAGNOSIS — F32.1 MODERATE MAJOR DEPRESSION (HCC): ICD-10-CM

## 2022-02-07 PROCEDURE — 99214 OFFICE O/P EST MOD 30 MIN: CPT | Performed by: FAMILY MEDICINE

## 2022-02-07 RX ORDER — DICLOFENAC SODIUM 10 MG/G
GEL TOPICAL 4 TIMES DAILY
Qty: 1 EACH | Refills: 5 | Status: SHIPPED | OUTPATIENT
Start: 2022-02-07

## 2022-02-07 RX ORDER — CYCLOBENZAPRINE HCL 10 MG
10 TABLET ORAL
Qty: 90 TABLET | Refills: 5 | Status: SHIPPED | OUTPATIENT
Start: 2022-02-07 | End: 2022-05-19 | Stop reason: SDUPTHER

## 2022-02-07 RX ORDER — TRAZODONE HYDROCHLORIDE 100 MG/1
100 TABLET ORAL
Qty: 90 TABLET | Refills: 1 | Status: SHIPPED | OUTPATIENT
Start: 2022-02-07 | End: 2022-05-19 | Stop reason: SDUPTHER

## 2022-02-07 RX ORDER — FLUOXETINE 20 MG/1
40 TABLET ORAL DAILY
Qty: 60 TABLET | Refills: 5 | Status: SHIPPED | OUTPATIENT
Start: 2022-02-07 | End: 2022-05-19 | Stop reason: ALTCHOICE

## 2022-02-07 RX ORDER — LIDOCAINE 50 MG/G
1 PATCH TOPICAL EVERY 12 HOURS
Qty: 1 EACH | Refills: 5 | Status: SHIPPED | OUTPATIENT
Start: 2022-02-07 | End: 2022-09-12 | Stop reason: SDUPTHER

## 2022-02-07 RX ORDER — FAMOTIDINE 40 MG/1
40 TABLET, FILM COATED ORAL DAILY
Qty: 90 TABLET | Refills: 1 | Status: SHIPPED | OUTPATIENT
Start: 2022-02-07 | End: 2022-05-19 | Stop reason: SDUPTHER

## 2022-02-07 RX ORDER — METFORMIN HYDROCHLORIDE 500 MG/1
TABLET ORAL
Qty: 180 TABLET | Refills: 3 | Status: SHIPPED | OUTPATIENT
Start: 2022-02-07 | End: 2022-09-12

## 2022-02-07 RX ORDER — GABAPENTIN 800 MG/1
800 TABLET ORAL 3 TIMES DAILY
Qty: 90 TABLET | Refills: 2 | Status: SHIPPED | OUTPATIENT
Start: 2022-02-07 | End: 2022-05-19 | Stop reason: SDUPTHER

## 2022-02-07 SDOH — SOCIAL STABILITY - SOCIAL INSECURITY: DEPENDENT RELATIVE NEEDING CARE AT HOME: Z63.6

## 2022-02-07 NOTE — PROGRESS NOTES
Subjective:     Jama Cole is a 64 y.o. female seen for follow-up of diabetes. She has had hypoglycemic attacks. .yes  Blood sugar control has been dec metformin to daily    Lab Results   Component Value Date/Time    Hemoglobin A1c 7.3 (H) 12/22/2020 03:10 PM    Hemoglobin A1c 7.2 (H) 07/29/2019 10:55 AM    Hemoglobin A1c 7.8 (H) 01/23/2019 03:39 PM    Glucose 162 (H) 12/22/2020 03:10 PM    Glucose (POC) 133 (H) 09/24/2014 04:43 PM    Microalb/Creat ratio (ug/mg creat.) 1.8 07/29/2019 10:31 AM    LDL, calculated 139 (H) 12/22/2020 03:10 PM    LDL, calculated 107 (H) 01/23/2019 03:39 PM    Creatinine 0.92 12/22/2020 03:10 PM       She has diabetes, hypertension and hyperlipidemia. Jama Cole has the additional concern of pain left thumb cyst drained previous, seen specialist dec surgery, right shoulder pain since fall in Oct 2021. Reports not taking blood pressure medications, prozac with side affects. No complaints of exertional chest pain, excessive shortness of breath or focal weakness. Minimal swelling in lower legs or dizziness with standing. Diet and Lifestyle: follows a diabetic diet regularly.     Patient Active Problem List    Diagnosis Date Noted    Caregiver burden 02/07/2022    COPD (chronic obstructive pulmonary disease) ([facility] [address] 11/04/2019    Fatty infiltration of liver 07/29/2019    Pain, chronic 04/22/2019    Type 2 diabetes mellitus with hyperglycemia, without long-term current use of insulin ([address] 05/08/2018    Type 2 diabetes mellitus with diabetic neuropathy ([address] 03/14/2018    S/P laparoscopic hysterectomy 06/24/2014    Hyperlipidemia 02/04/2013    Anxiety 02/04/2013     Allergies   Allergen Reactions    Codeine Hives     Past Medical History:   Diagnosis Date    Back pain     Degenerative disc disease     Diabetes ([address]     ; glucose runs 200-300 at home    GERD (gastroesophageal reflux disease)     none recently, takes nexium prn only    Hypercholesteremia     Hypertension     Menopause     Psychiatric disorder     Anxiety; did not take xanax this am     Social History     Tobacco Use    Smoking status: Current Every Day Smoker     Packs/day: 1.00     Years: 30.00     Pack years: 30.00    Smokeless tobacco: Current User    Tobacco comment: Vape User   Substance Use Topics    Alcohol use: No             Review of Systems  Pertinent items are noted in HPI. Out of some meds x 1-2 weeks    Objective:     Significant for the following: WNL  Visit Vitals  /63 (BP 1 Location: Left arm, BP Patient Position: Sitting, BP Cuff Size: Adult)   Pulse 82   Temp 98.6 °F (37 °C) (Temporal)   Resp 16   Ht 5' 5.5\" (1.664 m)   Wt 157 lb (71.2 kg)   LMP 06/03/2014   SpO2 96%   BMI 25.73 kg/m²     WD WN female NAD  Heart RRR without murmers clicks or rubs  Lungs CTA  Abdo soft nontender  Ext no edema  Diabetic foot exam was performed. No obvious sores or red lesions. Sensation checked by monofilament exam which was normal.  Dorsalis pedis pulse was nl. Lab review: labs reviewed, I note that glycosylated hemoglobin mildly abnormal but acceptable. Assessment/Plan:     Follow-up diabetes well controlled, stable. Diabetic issues reviewed with her: all medications, side effects and compliance discussed carefully, annual eye examinations at Ophthalmology discussed, glycohemoglobin and other lab monitoring discussed, patient urged in the strongest terms to quit smoking and labs immediately prior to next visit. Chronic Conditions Addressed Today     1.  Type 2 diabetes mellitus with diabetic neuropathy (HCC) - Primary     Relevant Medications     traZODone (DESYREL) 100 mg tablet     metFORMIN (GLUCOPHAGE) 500 mg tablet     gabapentin (NEURONTIN) 800 mg tablet     FLUoxetine (PROzac) 20 mg tablet     cyclobenzaprine (FLEXERIL) 10 mg tablet     Other Relevant Orders     METABOLIC PANEL, COMPREHENSIVE     CBC W/O DIFF     HEMOGLOBIN A1C WITH EAG REFERRAL TO OPTOMETRY    2. COPD (chronic obstructive pulmonary disease) (HCC)    3. Caregiver burden    4.  Anxiety     Relevant Medications     traZODone (DESYREL) 100 mg tablet     gabapentin (NEURONTIN) 800 mg tablet     FLUoxetine (PROzac) 20 mg tablet      Acute Diagnoses Addressed Today     Chronic pain of left thumb            Relevant Medications        lidocaine (LIDODERM) 5 %        gabapentin (NEURONTIN) 800 mg tablet        Other Relevant Orders        REFERRAL TO ORTHOPEDIC SURGERY    Chronic right shoulder pain            Relevant Medications        diclofenac (VOLTAREN) 1 % gel        Other Relevant Orders        REFERRAL TO ORTHOPEDIC SURGERY        RA + CCP ABS    Hyperlipemia, mixed            Relevant Orders        LIPID PANEL    Insomnia, unspecified type            Relevant Medications        traZODone (DESYREL) 100 mg tablet    Migraine with status migrainosus, not intractable, unspecified migraine type            Relevant Medications        traZODone (DESYREL) 100 mg tablet        gabapentin (NEURONTIN) 800 mg tablet        FLUoxetine (PROzac) 20 mg tablet        cyclobenzaprine (FLEXERIL) 10 mg tablet    Neuropathy due to type 2 diabetes mellitus (HCC)            Relevant Medications        traZODone (DESYREL) 100 mg tablet        metFORMIN (GLUCOPHAGE) 500 mg tablet        gabapentin (NEURONTIN) 800 mg tablet        FLUoxetine (PROzac) 20 mg tablet        cyclobenzaprine (FLEXERIL) 10 mg tablet    Moderate major depression (HCC)            Relevant Medications        traZODone (DESYREL) 100 mg tablet        gabapentin (NEURONTIN) 800 mg tablet        FLUoxetine (PROzac) 20 mg tablet    Screen for colon cancer            Relevant Orders        OCCULT BLOOD IMMUNOASSAY,DIAGNOSTIC    Gastroesophageal reflux disease, unspecified whether esophagitis present            Relevant Medications        famotidine (PEPCID) 40 mg tablet        cyclobenzaprine (FLEXERIL) 10 mg tablet        Orders Placed This Encounter    LIPID PANEL     Standing Status:   Future     Standing Expiration Date:       METABOLIC PANEL, COMPREHENSIVE     Standing Status:   Future     Standing Expiration Date:   8/10/2022    CBC W/O DIFF     Standing Status:   Future     Standing Expiration Date:   8/10/2022    RA + CCP ABS     Standing Status:   Future     Standing Expiration Date:   2023    HEMOGLOBIN A1C WITH EAG     Standing Status:   Future     Standing Expiration Date:   2023    OCCULT BLOOD IMMUNOASSAY,DIAGNOSTIC     Standing Status:   Future     Number of Occurrences:   1     Standing Expiration Date:   2023     Order Specific Question:   QUEST SOURCE     Answer:   Stool [1161]    REFERRAL TO ORTHOPEDIC SURGERY     Referral Priority:   Routine     Referral Type:   Consultation     Referral Reason:   Specialty Services Required     Referred to Provider:   Nestor Padilla MD     Number of Visits Requested:   1    REFERRAL TO ORTHOPEDIC SURGERY     Referral Priority:   Routine     Referral Type:   Consultation     Referral Reason:   Specialty Services Required     Referred to Provider:   Verna Kirkpatrick DO     Number of Visits Requested:   1    REFERRAL TO OPTOMETRY     Referral Priority:   Routine     Referral Type:   Consultation     Referral Reason:   Specialty Services Required     Referred to Provider:   Jase Block OD    lidocaine (LIDODERM) 5 %     Si Patch by TransDERmal route every twelve (12) hours. Apply patch to the affected area for 12 hours a day and remove for 12 hours a day. Dispense:  1 Each     Refill:  5    diclofenac (VOLTAREN) 1 % gel     Sig: Apply  to affected area four (4) times daily. Dispense:  1 Each     Refill:  5    famotidine (PEPCID) 40 mg tablet     Sig: Take 1 Tablet by mouth daily. Dispense:  90 Tablet     Refill:  1    traZODone (DESYREL) 100 mg tablet     Sig: Take 1 Tablet by mouth nightly.  As needed     Dispense:  90 Tablet     Refill: 1    metFORMIN (GLUCOPHAGE) 500 mg tablet     Sig: TAKE 1 TABLET BY MOUTH TWICE A DAY WITH MEALS     Dispense:  180 Tablet     Refill:  3    gabapentin (NEURONTIN) 800 mg tablet     Sig: Take 1 Tablet by mouth three (3) times daily. Max Daily Amount: 2,400 mg. Indications: neuropathic pain     Dispense:  90 Tablet     Refill:  2    FLUoxetine (PROzac) 20 mg tablet     Sig: Take 2 Tablets by mouth daily. Dispense:  60 Tablet     Refill:  5    cyclobenzaprine (FLEXERIL) 10 mg tablet     Sig: Take 1 Tablet by mouth three (3) times daily as needed for Muscle Spasm(s). Dispense:  90 Tablet     Refill:  5     Current Outpatient Medications   Medication Sig Dispense Refill    lidocaine (LIDODERM) 5 % 1 Patch by TransDERmal route every twelve (12) hours. Apply patch to the affected area for 12 hours a day and remove for 12 hours a day. 1 Each 5    diclofenac (VOLTAREN) 1 % gel Apply  to affected area four (4) times daily. 1 Each 5    famotidine (PEPCID) 40 mg tablet Take 1 Tablet by mouth daily. 90 Tablet 1    traZODone (DESYREL) 100 mg tablet Take 1 Tablet by mouth nightly. As needed 90 Tablet 1    metFORMIN (GLUCOPHAGE) 500 mg tablet TAKE 1 TABLET BY MOUTH TWICE A DAY WITH MEALS (Patient taking differently: 500 mg daily (with breakfast). TAKE 1 TABLET BY MOUTH TWICE A DAY WITH MEALS) 180 Tablet 3    gabapentin (NEURONTIN) 800 mg tablet Take 1 Tablet by mouth three (3) times daily. Max Daily Amount: 2,400 mg. Indications: neuropathic pain 90 Tablet 2    FLUoxetine (PROzac) 20 mg tablet Take 2 Tablets by mouth daily. 60 Tablet 5    cyclobenzaprine (FLEXERIL) 10 mg tablet Take 1 Tablet by mouth three (3) times daily as needed for Muscle Spasm(s). 90 Tablet 5    FLUoxetine (PROzac) 40 mg capsule Take 1 Capsule by mouth two (2) times a day. 180 Capsule 1    cloNIDine HCL (CATAPRES) 0.1 mg tablet Take 2 Tablets by mouth nightly.  Start 1 at night increase as tolerated 180 Tablet 1    furosemide (LASIX) 20 mg tablet Daily as needed for swelling 90 Tablet 1    butalbital-acetaminophen-caffeine (FIORICET, ESGIC) -40 mg per tablet Take 1 Tab by mouth every six (6) hours as needed for Headache. 12 Tab 1    albuterol (PROVENTIL HFA, VENTOLIN HFA, PROAIR HFA) 90 mcg/actuation inhaler Take 1 Puff by inhalation every four (4) hours as needed for Wheezing. 1 Inhaler 5    buprenorphine-naloxone (SUBOXONE) 8-2 mg film sublingaul film by SubLINGual route. Follow-up and Dispositions    · Return in about 3 months (around 5/7/2022) for routine follow up.

## 2022-02-07 NOTE — PROGRESS NOTES
Chief Complaint   Patient presents with    Diabetes     Patient has not been out of the country in (14 months), NO diarrhea, NO cough, NO chest conjestion, NO temp. Pt has not been around anyone with these symptoms. Health Maintenance reviewed. I have reviewed the patient's medical history in detail and updated the computerized patient record. 1. Have you been to the ER, urgent care clinic since your last visit? No  Hospitalized since your last visit? No     2. Have you seen or consulted any other health care providers outside of the [address] [place] [address] since your last visit? no  Include any pap smears or colon screening. Encouraged pt to discuss pt's wishes with spouse/partner/family and bring them in the next appt to follow thru with the Advanced Directive    @  [address] [facility] [address], last 12 mths 12/24/2020   Able to walk? Yes   Fall in past 12 months? 0   Do you feel unsteady? 0   Are you worried about falling 0       3 most recent PHQ Screens 2/7/2022   Little interest or pleasure in doing things Several days   Feeling down, depressed, irritable, or hopeless Several days   Total Score PHQ 2 2       Abuse Screening Questionnaire 2/7/2022   Do you ever feel afraid of your partner? N   Are you in a relationship with someone who physically or mentally threatens you? N   Is it safe for you to go home?  Y       ADL Assessment 2/7/2022   Feeding yourself No Help Needed   Getting from bed to chair No Help Needed   Getting dressed No Help Needed   Bathing or showering No Help Needed   Walk across the room (includes cane/walker) No Help Needed   Using the telphone No Help Needed   Taking your medications No Help Needed   Preparing meals No Help Needed   Managing money (expenses/bills) No Help Needed   Moderately strenuous housework (laundry) No Help Needed   Shopping for personal items (toiletries/medicines) No Help Needed   Shopping for groceries No Help Needed   Driving No Help Needed   Climbing a flight of stairs No Help Needed   Getting to places beyond walking distances No Help Needed

## 2022-03-18 PROBLEM — K76.0 FATTY INFILTRATION OF LIVER: Status: ACTIVE | Noted: 2019-07-29

## 2022-03-19 PROBLEM — Z63.6 CAREGIVER BURDEN: Status: ACTIVE | Noted: 2022-02-07

## 2022-03-19 PROBLEM — E11.40 TYPE 2 DIABETES MELLITUS WITH DIABETIC NEUROPATHY (HCC): Status: ACTIVE | Noted: 2018-03-14

## 2022-03-19 PROBLEM — J44.9 COPD (CHRONIC OBSTRUCTIVE PULMONARY DISEASE) (HCC): Status: ACTIVE | Noted: 2019-11-04

## 2022-03-20 PROBLEM — G89.29 PAIN, CHRONIC: Status: ACTIVE | Noted: 2019-04-22

## 2022-03-20 PROBLEM — E11.65 TYPE 2 DIABETES MELLITUS WITH HYPERGLYCEMIA, WITHOUT LONG-TERM CURRENT USE OF INSULIN (HCC): Status: ACTIVE | Noted: 2018-05-08

## 2022-05-19 ENCOUNTER — VIRTUAL VISIT (OUTPATIENT)
Dept: INTERNAL MEDICINE CLINIC | Age: 57
End: 2022-05-19
Payer: COMMERCIAL

## 2022-05-19 DIAGNOSIS — E11.65 TYPE 2 DIABETES MELLITUS WITH HYPERGLYCEMIA, WITHOUT LONG-TERM CURRENT USE OF INSULIN (HCC): Primary | ICD-10-CM

## 2022-05-19 DIAGNOSIS — G89.29 CHRONIC PAIN OF LEFT THUMB: ICD-10-CM

## 2022-05-19 DIAGNOSIS — F32.1 MODERATE MAJOR DEPRESSION (HCC): ICD-10-CM

## 2022-05-19 DIAGNOSIS — M79.645 CHRONIC PAIN OF LEFT THUMB: ICD-10-CM

## 2022-05-19 DIAGNOSIS — G47.00 INSOMNIA, UNSPECIFIED TYPE: ICD-10-CM

## 2022-05-19 DIAGNOSIS — K21.9 GASTROESOPHAGEAL REFLUX DISEASE, UNSPECIFIED WHETHER ESOPHAGITIS PRESENT: ICD-10-CM

## 2022-05-19 DIAGNOSIS — N95.1 HOT FLASHES DUE TO MENOPAUSE: ICD-10-CM

## 2022-05-19 DIAGNOSIS — J44.1 COPD EXACERBATION (HCC): ICD-10-CM

## 2022-05-19 PROCEDURE — 99214 OFFICE O/P EST MOD 30 MIN: CPT | Performed by: FAMILY MEDICINE

## 2022-05-19 RX ORDER — FAMOTIDINE 40 MG/1
40 TABLET, FILM COATED ORAL DAILY
Qty: 30 TABLET | Refills: 5 | Status: SHIPPED | OUTPATIENT
Start: 2022-05-19 | End: 2022-09-12 | Stop reason: SDUPTHER

## 2022-05-19 RX ORDER — TRAZODONE HYDROCHLORIDE 100 MG/1
100 TABLET ORAL
Qty: 30 TABLET | Refills: 5 | Status: SHIPPED | OUTPATIENT
Start: 2022-05-19 | End: 2022-09-12 | Stop reason: SDUPTHER

## 2022-05-19 RX ORDER — CYCLOBENZAPRINE HCL 10 MG
10 TABLET ORAL
Qty: 90 TABLET | Refills: 5 | Status: SHIPPED | OUTPATIENT
Start: 2022-05-19 | End: 2022-09-12 | Stop reason: SDUPTHER

## 2022-05-19 RX ORDER — CLONIDINE HYDROCHLORIDE 0.1 MG/1
0.2 TABLET ORAL
Qty: 60 TABLET | Refills: 5 | Status: SHIPPED | OUTPATIENT
Start: 2022-05-19 | End: 2022-09-12 | Stop reason: SDUPTHER

## 2022-05-19 RX ORDER — FLUOXETINE 20 MG/1
20 TABLET ORAL 3 TIMES DAILY
Qty: 90 TABLET | Refills: 5 | Status: SHIPPED | OUTPATIENT
Start: 2022-05-19 | End: 2022-09-12 | Stop reason: SDUPTHER

## 2022-05-19 RX ORDER — ALBUTEROL SULFATE 90 UG/1
1 AEROSOL, METERED RESPIRATORY (INHALATION)
Qty: 1 EACH | Refills: 5 | Status: SHIPPED | OUTPATIENT
Start: 2022-05-19

## 2022-05-19 RX ORDER — GABAPENTIN 800 MG/1
800 TABLET ORAL 3 TIMES DAILY
Qty: 90 TABLET | Refills: 2 | Status: SHIPPED | OUTPATIENT
Start: 2022-05-19 | End: 2022-09-12 | Stop reason: SDUPTHER

## 2022-05-19 NOTE — PROGRESS NOTES
Subjective:     Riley Fink is a 62 y.o. female seen for follow-up of diabetes. She has had hypoglycemic attacks. .no  Blood sugar control has been unknown    Lab Results   Component Value Date/Time    Hemoglobin A1c 7.3 (H) 12/22/2020 03:10 PM    Hemoglobin A1c 7.2 (H) 07/29/2019 10:55 AM    Hemoglobin A1c 7.8 (H) 01/23/2019 03:39 PM    Glucose 162 (H) 12/22/2020 03:10 PM    Glucose (POC) 133 (H) 09/24/2014 04:43 PM    Microalb/Creat ratio (ug/mg creat.) 1.8 07/29/2019 10:31 AM    LDL, calculated 139 (H) 12/22/2020 03:10 PM    LDL, calculated 107 (H) 01/23/2019 03:39 PM    Creatinine 0.92 12/22/2020 03:10 PM       She has diabetes and hypertension. Riley Fink has the additional concern of Dm check up wants clonidine RF and prozac tabs    Reports taking blood pressure medications without side affects. No complaints of exertional chest pain, excessive shortness of breath or focal weakness. Minimal swelling in lower legs or dizziness with standing. Diet and Lifestyle: nonsmoker, vapes. Patient Active Problem List    Diagnosis Date Noted    Caregiver burden 02/07/2022    COPD (chronic obstructive pulmonary disease) ([facility] 11/04/2019    Fatty infiltration of liver 07/29/2019    Pain, chronic 04/22/2019    Type 2 diabetes mellitus with hyperglycemia, without long-term current use of insulin ([facility] 05/08/2018    Type 2 diabetes mellitus with diabetic neuropathy ([facility] 03/14/2018    S/P laparoscopic hysterectomy 06/24/2014    Hyperlipidemia 02/04/2013    Anxiety 02/04/2013     Current Outpatient Medications   Medication Sig Dispense Refill    cloNIDine HCL (CATAPRES) 0.1 mg tablet Take 2 Tablets by mouth nightly. 60 Tablet 5    famotidine (PEPCID) 40 mg tablet Take 1 Tablet by mouth daily. 30 Tablet 5    traZODone (DESYREL) 100 mg tablet Take 1 Tablet by mouth nightly. As needed 30 Tablet 5    gabapentin (NEURONTIN) 800 mg tablet Take 1 Tablet by mouth three (3) times daily.  Max Daily Amount: 2,400 mg. Indications: neuropathic pain 90 Tablet 2    FLUoxetine (PROzac) 20 mg tablet Take 1 Tablet by mouth three (3) times daily. 90 Tablet 5    cyclobenzaprine (FLEXERIL) 10 mg tablet Take 1 Tablet by mouth three (3) times daily as needed for Muscle Spasm(s). 90 Tablet 5    albuterol (PROVENTIL HFA, VENTOLIN HFA, PROAIR HFA) 90 mcg/actuation inhaler Take 1 Puff by inhalation every four (4) hours as needed for Wheezing. 1 Each 5    lidocaine (LIDODERM) 5 % 1 Patch by TransDERmal route every twelve (12) hours. Apply patch to the affected area for 12 hours a day and remove for 12 hours a day. 1 Each 5    diclofenac (VOLTAREN) 1 % gel Apply  to affected area four (4) times daily. 1 Each 5    metFORMIN (GLUCOPHAGE) 500 mg tablet TAKE 1 TABLET BY MOUTH TWICE A DAY WITH MEALS (Patient taking differently: 500 mg daily (with breakfast). TAKE 1 TABLET BY MOUTH TWICE A DAY WITH MEALS) 180 Tablet 3    furosemide (LASIX) 20 mg tablet Daily as needed for swelling 90 Tablet 1    buprenorphine-naloxone (SUBOXONE) 8-2 mg film sublingaul film by SubLINGual route.        Allergies   Allergen Reactions    Codeine Hives     Past Medical History:   Diagnosis Date    Back pain     Degenerative disc disease     Diabetes ([address]     ; glucose runs 200-300 at home    GERD (gastroesophageal reflux disease)     none recently, takes nexium prn only    Hypercholesteremia     Hypertension     Menopause     Psychiatric disorder     Anxiety; did not take xanax this am     Social History     Tobacco Use    Smoking status: Former Smoker     Packs/day: 1.00     Years: 30.00     Pack years: 30.00    Smokeless tobacco: Former User     Quit date: 3/1/2022    Tobacco comment: Vape User   Substance Use Topics    Alcohol use: No        Lab Results   Component Value Date/Time    Hemoglobin A1c 7.3 (H) 12/22/2020 03:10 PM    Hemoglobin A1c 7.2 (H) 07/29/2019 10:55 AM    Hemoglobin A1c 7.8 (H) 01/23/2019 03:39 PM    Glucose 162 (H) 12/22/2020 03:10 PM    Glucose (POC) 133 (H) 09/24/2014 04:43 PM    Microalb/Creat ratio (ug/mg creat.) 1.8 07/29/2019 10:31 AM    LDL, calculated 139 (H) 12/22/2020 03:10 PM    LDL, calculated 107 (H) 01/23/2019 03:39 PM    Creatinine 0.92 12/22/2020 03:10 PM      Lab Results   Component Value Date/Time    Cholesterol, total 221 (H) 12/22/2020 03:10 PM    HDL Cholesterol 39 (L) 12/22/2020 03:10 PM    LDL, calculated 139 (H) 12/22/2020 03:10 PM    LDL, calculated 107 (H) 01/23/2019 03:39 PM    Triglyceride 239 (H) 12/22/2020 03:10 PM     Lab Results   Component Value Date/Time    ALT (SGPT) 40 (H) 12/22/2020 03:10 PM    Alk. phosphatase 94 12/22/2020 03:10 PM    Bilirubin, direct 0.06 03/15/2018 12:00 AM    Bilirubin, total 0.2 12/22/2020 03:10 PM    Albumin 4.3 12/22/2020 03:10 PM    Protein, total 6.9 12/22/2020 03:10 PM    PLATELET 307 (L) 83/77/5066 01:15 PM     Lab Results   Component Value Date/Time    GFR est non-AA 70 12/22/2020 03:10 PM    GFR est AA 81 12/22/2020 03:10 PM    Creatinine 0.92 12/22/2020 03:10 PM    BUN 14 12/22/2020 03:10 PM    Sodium 139 12/22/2020 03:10 PM    Potassium 4.6 12/22/2020 03:10 PM    Chloride 97 12/22/2020 03:10 PM    CO2 28 12/22/2020 03:10 PM     Lab Results   Component Value Date/Time    Glucose 162 (H) 12/22/2020 03:10 PM    Glucose (POC) 133 (H) 09/24/2014 04:43 PM         Review of Systems  Pertinent items are noted in HPI. Objective:     Significant for the following: WNL  Visit Vitals  LMP 06/03/2014     WD WN female NAD      Lab review: labs reviewed, I note that glycosylated hemoglobin mildly abnormal but acceptable. Assessment/Plan:     Follow-up diabetes well controlled, stable. Diabetic issues reviewed with her: all medications, side effects and compliance discussed carefully, glycohemoglobin and other lab monitoring discussed, patient urged in the strongest terms to quit smoking and labs immediately prior to next visit.        ICD-10-CM ICD-9-CM 1. Type 2 diabetes mellitus with hyperglycemia, without long-term current use of insulin (formerly [facility])  W85.66 149.31 METABOLIC PANEL, BASIC     790.29 HEMOGLOBIN A1C WITH EAG      REFERRAL TO OPTOMETRY      MICROALBUMIN, UR, RAND W/ MICROALB/CREAT RATIO      URINALYSIS W/ REFLEX CULTURE   2. Hot flashes due to menopause  N95.1 627.2 cloNIDine HCL (CATAPRES) 0.1 mg tablet   3. Gastroesophageal reflux disease, unspecified whether esophagitis present  K21.9 530.81 famotidine (PEPCID) 40 mg tablet      cyclobenzaprine (FLEXERIL) 10 mg tablet   4. Insomnia, unspecified type  G47.00 780.52 traZODone (DESYREL) 100 mg tablet   5. Chronic pain of left thumb  M79.645 729.5 gabapentin (NEURONTIN) 800 mg tablet    G89.29 338.29    6. Moderate major depression ([facility])  F32.1 296.22 FLUoxetine (PROzac) 20 mg tablet   7. COPD exacerbation (formerly [facility])  J44.1 491.21 albuterol (PROVENTIL HFA, VENTOLIN HFA, PROAIR HFA) 90 mcg/actuation inhaler       Orders Placed This Encounter    METABOLIC PANEL, BASIC     Standing Status:   Future     Standing Expiration Date:   11/19/2022    HEMOGLOBIN A1C WITH EAG     Standing Status:   Future     Standing Expiration Date:   5/19/2023    MICROALBUMIN, UR, RAND W/ MICROALB/CREAT RATIO     Standing Status:   Future     Standing Expiration Date:   5/19/2023    URINALYSIS W/ REFLEX CULTURE     Standing Status:   Future     Standing Expiration Date:   5/19/2023    REFERRAL TO OPTOMETRY     Referral Priority:   Routine     Referral Type:   Consultation     Referral Reason:   Specialty Services Required     Referred to Provider:   Ernestina Golden OD    cloNIDine HCL (CATAPRES) 0.1 mg tablet     Sig: Take 2 Tablets by mouth nightly. Dispense:  60 Tablet     Refill:  5    famotidine (PEPCID) 40 mg tablet     Sig: Take 1 Tablet by mouth daily. Dispense:  30 Tablet     Refill:  5    traZODone (DESYREL) 100 mg tablet     Sig: Take 1 Tablet by mouth nightly.  As needed     Dispense:  30 Tablet     Refill: 5    gabapentin (NEURONTIN) 800 mg tablet     Sig: Take 1 Tablet by mouth three (3) times daily. Max Daily Amount: 2,400 mg. Indications: neuropathic pain     Dispense:  90 Tablet     Refill:  2    FLUoxetine (PROzac) 20 mg tablet     Sig: Take 1 Tablet by mouth three (3) times daily. Dispense:  90 Tablet     Refill:  5    cyclobenzaprine (FLEXERIL) 10 mg tablet     Sig: Take 1 Tablet by mouth three (3) times daily as needed for Muscle Spasm(s). Dispense:  90 Tablet     Refill:  5    albuterol (PROVENTIL HFA, VENTOLIN HFA, PROAIR HFA) 90 mcg/actuation inhaler     Sig: Take 1 Puff by inhalation every four (4) hours as needed for Wheezing. Dispense:  1 Each     Refill:  5             Chronic Conditions Addressed Today     1.  Type 2 diabetes mellitus with hyperglycemia, without long-term current use of insulin ([facility]) - Primary     Relevant Medications     gabapentin (NEURONTIN) 800 mg tablet     Other Relevant Orders     METABOLIC PANEL, BASIC     HEMOGLOBIN A1C WITH EAG     REFERRAL TO OPTOMETRY     MICROALBUMIN, UR, RAND W/ MICROALB/CREAT RATIO     URINALYSIS W/ REFLEX CULTURE      Acute Diagnoses Addressed Today     Hot flashes due to menopause            Relevant Medications        cloNIDine HCL (CATAPRES) 0.1 mg tablet    Gastroesophageal reflux disease, unspecified whether esophagitis present            Relevant Medications        famotidine (PEPCID) 40 mg tablet        cyclobenzaprine (FLEXERIL) 10 mg tablet    Insomnia, unspecified type            Relevant Medications        traZODone (DESYREL) 100 mg tablet    Chronic pain of left thumb            Relevant Medications        gabapentin (NEURONTIN) 800 mg tablet    Moderate major depression ([facility])            Relevant Medications        traZODone (DESYREL) 100 mg tablet        gabapentin (NEURONTIN) 800 mg tablet        FLUoxetine (PROzac) 20 mg tablet    COPD exacerbation ([facility])            Relevant Medications        albuterol (PROVENTIL HFA, VENTOLIN HFA, PROAIR HFA) 90 mcg/actuation inhaler          Current Outpatient Medications   Medication Sig Dispense Refill    cloNIDine HCL (CATAPRES) 0.1 mg tablet Take 2 Tablets by mouth nightly. 60 Tablet 5    famotidine (PEPCID) 40 mg tablet Take 1 Tablet by mouth daily. 30 Tablet 5    traZODone (DESYREL) 100 mg tablet Take 1 Tablet by mouth nightly. As needed 30 Tablet 5    gabapentin (NEURONTIN) 800 mg tablet Take 1 Tablet by mouth three (3) times daily. Max Daily Amount: 2,400 mg. Indications: neuropathic pain 90 Tablet 2    FLUoxetine (PROzac) 20 mg tablet Take 1 Tablet by mouth three (3) times daily. 90 Tablet 5    cyclobenzaprine (FLEXERIL) 10 mg tablet Take 1 Tablet by mouth three (3) times daily as needed for Muscle Spasm(s). 90 Tablet 5    albuterol (PROVENTIL HFA, VENTOLIN HFA, PROAIR HFA) 90 mcg/actuation inhaler Take 1 Puff by inhalation every four (4) hours as needed for Wheezing. 1 Each 5    lidocaine (LIDODERM) 5 % 1 Patch by TransDERmal route every twelve (12) hours. Apply patch to the affected area for 12 hours a day and remove for 12 hours a day. 1 Each 5    diclofenac (VOLTAREN) 1 % gel Apply  to affected area four (4) times daily. 1 Each 5    metFORMIN (GLUCOPHAGE) 500 mg tablet TAKE 1 TABLET BY MOUTH TWICE A DAY WITH MEALS (Patient taking differently: 500 mg daily (with breakfast). TAKE 1 TABLET BY MOUTH TWICE A DAY WITH MEALS) 180 Tablet 3    furosemide (LASIX) 20 mg tablet Daily as needed for swelling 90 Tablet 1    buprenorphine-naloxone (SUBOXONE) 8-2 mg film sublingaul film by SubLINGual route. Discussed possible side affects, precautions, and drug interactions and possible benefits of the medication(s). Lyn Coelho, who was evaluated through a synchronous (real-time) audio-video encounter, and/or her healthcare decision maker, is aware that it is a billable service, which includes applicable co-pays, with coverage as determined by her insurance carrier. She provided verbal consent to proceed and patient identification was verified. This visit was conducted pursuant to the emergency declaration under the [facility] [address] and the [employer] and [employer] General Act. A caregiver was present when appropriate. Ability to conduct physical exam was limited. The patient was located at home in a state where the provider was licensed to provide care.      --Pranav Knapp MD on 5/20/2022 at 11:27 AM

## 2022-05-19 NOTE — PROGRESS NOTES
Chief Complaint   Patient presents with    Medication Refill     did not get labs done prior to this visit

## 2022-09-12 ENCOUNTER — VIRTUAL VISIT (OUTPATIENT)
Dept: INTERNAL MEDICINE CLINIC | Age: 57
End: 2022-09-12
Payer: COMMERCIAL

## 2022-09-12 DIAGNOSIS — G47.00 INSOMNIA, UNSPECIFIED TYPE: ICD-10-CM

## 2022-09-12 DIAGNOSIS — M79.645 CHRONIC PAIN OF LEFT THUMB: ICD-10-CM

## 2022-09-12 DIAGNOSIS — G89.29 CHRONIC PAIN OF LEFT THUMB: ICD-10-CM

## 2022-09-12 DIAGNOSIS — F32.1 MODERATE MAJOR DEPRESSION (HCC): ICD-10-CM

## 2022-09-12 DIAGNOSIS — N95.1 HOT FLASHES DUE TO MENOPAUSE: ICD-10-CM

## 2022-09-12 DIAGNOSIS — K21.9 GASTROESOPHAGEAL REFLUX DISEASE, UNSPECIFIED WHETHER ESOPHAGITIS PRESENT: ICD-10-CM

## 2022-09-12 DIAGNOSIS — E11.40 TYPE 2 DIABETES MELLITUS WITH DIABETIC NEUROPATHY, WITHOUT LONG-TERM CURRENT USE OF INSULIN (HCC): ICD-10-CM

## 2022-09-12 PROCEDURE — 99214 OFFICE O/P EST MOD 30 MIN: CPT | Performed by: FAMILY MEDICINE

## 2022-09-12 RX ORDER — FAMOTIDINE 40 MG/1
40 TABLET, FILM COATED ORAL DAILY
Qty: 30 TABLET | Refills: 5 | Status: SHIPPED | OUTPATIENT
Start: 2022-09-12

## 2022-09-12 RX ORDER — FLUOXETINE 20 MG/1
TABLET ORAL
Qty: 90 TABLET | Refills: 5 | Status: SHIPPED | OUTPATIENT
Start: 2022-09-12

## 2022-09-12 RX ORDER — TRAZODONE HYDROCHLORIDE 100 MG/1
100 TABLET ORAL
Qty: 30 TABLET | Refills: 5 | Status: SHIPPED | OUTPATIENT
Start: 2022-09-12

## 2022-09-12 RX ORDER — CYCLOBENZAPRINE HCL 10 MG
10 TABLET ORAL
Qty: 90 TABLET | Refills: 5 | Status: SHIPPED | OUTPATIENT
Start: 2022-09-12

## 2022-09-12 RX ORDER — GLIPIZIDE 5 MG/1
5 TABLET ORAL 2 TIMES DAILY
Qty: 60 TABLET | Refills: 5 | Status: SHIPPED | OUTPATIENT
Start: 2022-09-12

## 2022-09-12 RX ORDER — METFORMIN HYDROCHLORIDE 500 MG/1
1000 TABLET ORAL 2 TIMES DAILY WITH MEALS
Qty: 360 TABLET | Refills: 1 | Status: SHIPPED | OUTPATIENT
Start: 2022-09-12 | End: 2022-09-19 | Stop reason: SDUPTHER

## 2022-09-12 RX ORDER — LIDOCAINE 50 MG/G
1 PATCH TOPICAL EVERY 12 HOURS
Qty: 30 EACH | Refills: 5 | Status: SHIPPED | OUTPATIENT
Start: 2022-09-12

## 2022-09-12 RX ORDER — CLONIDINE HYDROCHLORIDE 0.1 MG/1
0.2 TABLET ORAL
Qty: 60 TABLET | Refills: 5 | Status: SHIPPED | OUTPATIENT
Start: 2022-09-12

## 2022-09-12 RX ORDER — GABAPENTIN 800 MG/1
800 TABLET ORAL 3 TIMES DAILY
Qty: 90 TABLET | Refills: 1 | Status: SHIPPED | OUTPATIENT
Start: 2022-09-12

## 2022-09-12 NOTE — PROGRESS NOTES
Subjective:     Bobbi Qiu is a 62 y.o. female seen for follow-up of diabetes. She has had hypoglycemic attacks. .no  Blood sugar control has been real high taking metformin, they have never been this high for this long. Lab Results   Component Value Date/Time    Hemoglobin A1c 7.3 (H) 12/22/2020 03:10 PM    Hemoglobin A1c 7.2 (H) 07/29/2019 10:55 AM    Hemoglobin A1c 7.8 (H) 01/23/2019 03:39 PM    Glucose 162 (H) 12/22/2020 03:10 PM    Glucose (POC) 133 (H) 09/24/2014 04:43 PM    Microalb/Creat ratio (ug/mg creat.) 1.8 07/29/2019 10:31 AM    LDL, calculated 139 (H) 12/22/2020 03:10 PM    LDL, calculated 107 (H) 01/23/2019 03:39 PM    Creatinine 0.92 12/22/2020 03:10 PM       She has diabetes, hypertension, and hyperlipidemia. Bobbi Qiu has the additional concern of sugar hi belle run > 300's otherwise feels okay some back pain issues. Previously treated with glipizide until her sugars came down. Reports taking blood pressure medications without side affects. No complaints of exertional chest pain, excessive shortness of breath or focal weakness. Minimal swelling in lower legs or dizziness with standing. No urination issues. Diet and Lifestyle: smoker vaping .     Patient Active Problem List    Diagnosis Date Noted    Caregiver burden 02/07/2022    COPD (chronic obstructive pulmonary disease) ([facility] 11/04/2019    Fatty infiltration of liver 07/29/2019    Pain, chronic 04/22/2019    Type 2 diabetes mellitus with hyperglycemia, without long-term current use of insulin ([facility] 05/08/2018    Type 2 diabetes mellitus with diabetic neuropathy ([facility] 03/14/2018    S/P laparoscopic hysterectomy 06/24/2014    Hyperlipidemia 02/04/2013    Anxiety 02/04/2013     Allergies   Allergen Reactions    Codeine Hives     Social History     Tobacco Use    Smoking status: Former     Packs/day: 1.00     Years: 30.00     Pack years: 30.00     Types: Cigarettes    Smokeless tobacco: Former     Quit date: 3/1/2022    Tobacco comments:     Vape User   Substance Use Topics    Alcohol use: No          Review of Systems  Pertinent items are noted in HPI. Objective:     Significant for the following: nad  Visit Vitals  LMP 06/03/2014     WD WN female NAD      Lab review: orders written for new lab studies as appropriate; see orders. Assessment/Plan:     Follow-up diabetes poorly controlled, loss of control due to intercurrent illness, needs further observation, needs improvement. Diabetic issues reviewed with her: all medications, side effects and compliance discussed carefully, glycohemoglobin and other lab monitoring discussed, and labs immediately prior to next visit. Refilled medications per med list  Okay to start glipizide. Discussed possible side affects, precautions, and drug interactions and possible benefits of the medication(s). Current Outpatient Medications   Medication Sig Dispense Refill    lidocaine (LIDODERM) 5 % 1 Patch by TransDERmal route every twelve (12) hours. Apply patch to the affected area for 12 hours a day and remove for 12 hours a day. 30 Each 5    metFORMIN (GLUCOPHAGE) 500 mg tablet Take 2 Tablets by mouth two (2) times daily (with meals). TAKE 1 TABLET BY MOUTH TWICE A DAY WITH MEALS 360 Tablet 1    glipiZIDE (GLUCOTROL) 5 mg tablet Take 1 Tablet by mouth two (2) times a day. 60 Tablet 5    gabapentin (NEURONTIN) 800 mg tablet Take 1 Tablet by mouth three (3) times daily. Max Daily Amount: 2,400 mg. Indications: neuropathic pain 90 Tablet 1    cloNIDine HCL (CATAPRES) 0.1 mg tablet Take 2 Tablets by mouth nightly. 60 Tablet 5    famotidine (PEPCID) 40 mg tablet Take 1 Tablet by mouth daily. 30 Tablet 5    traZODone (DESYREL) 100 mg tablet Take 1 Tablet by mouth nightly. As needed 30 Tablet 5    FLUoxetine (PROzac) 20 mg tablet 1 with meals 90 Tablet 5    cyclobenzaprine (FLEXERIL) 10 mg tablet Take 1 Tablet by mouth three (3) times daily as needed for Muscle Spasm(s).  80 Tablet 5    albuterol (PROVENTIL HFA, VENTOLIN HFA, PROAIR HFA) 90 mcg/actuation inhaler Take 1 Puff by inhalation every four (4) hours as needed for Wheezing. 1 Each 5    diclofenac (VOLTAREN) 1 % gel Apply  to affected area four (4) times daily. 1 Each 5    furosemide (LASIX) 20 mg tablet Daily as needed for swelling 90 Tablet 1    buprenorphine-naloxone (SUBOXONE) 8-2 mg film sublingaul film by SubLINGual route. Chronic Conditions Addressed Today       1.  Type 2 diabetes mellitus with diabetic neuropathy (HCC)     Relevant Medications     metFORMIN (GLUCOPHAGE) 500 mg tablet     glipiZIDE (GLUCOTROL) 5 mg tablet     gabapentin (NEURONTIN) 800 mg tablet     traZODone (DESYREL) 100 mg tablet     FLUoxetine (PROzac) 20 mg tablet     cyclobenzaprine (FLEXERIL) 10 mg tablet     Other Relevant Orders     METABOLIC PANEL, BASIC     HEMOGLOBIN A1C WITH EAG     Acute Diagnoses Addressed Today       Chronic pain of left thumb            Relevant Medications        lidocaine (LIDODERM) 5 %        gabapentin (NEURONTIN) 800 mg tablet    Hot flashes due to menopause            Relevant Medications        metFORMIN (GLUCOPHAGE) 500 mg tablet        cloNIDine HCL (CATAPRES) 0.1 mg tablet    Gastroesophageal reflux disease, unspecified whether esophagitis present            Relevant Medications        famotidine (PEPCID) 40 mg tablet        cyclobenzaprine (FLEXERIL) 10 mg tablet    Insomnia, unspecified type            Relevant Medications        traZODone (DESYREL) 100 mg tablet    Moderate major depression (HCC)            Relevant Medications        gabapentin (NEURONTIN) 800 mg tablet        traZODone (DESYREL) 100 mg tablet        FLUoxetine (PROzac) 20 mg tablet            Digital Royalty, who was evaluated through a synchronous (real-time) audio-video encounter, and/or her healthcare decision maker, is aware that it is a billable service, which includes applicable co-pays, with coverage as determined by her insurance carrier. She provided verbal consent to proceed and patient identification was verified. This visit was conducted pursuant to the emergency declaration under the [address] [place] [address] and the [employer] General Act. A caregiver was present when appropriate. Ability to conduct physical exam was limited. The patient was located at: Home: [place] [id number]  The provider was located at:  [facility] [address] MD on 9/12/2022 at 2:26 PM

## 2022-09-12 NOTE — PROGRESS NOTES
Chief Complaint   Patient presents with    High Blood Sugar     Patient has not been out of the country in (14 months), NO diarrhea, NO cough, NO chest conjestion, NO temp. Pt has not been around anyone with these symptoms. Health Maintenance reviewed. I have reviewed the patient's medical history in detail and updated the computerized patient record. 1. Have you been to the ER, urgent care clinic since your last visit? No  Hospitalized since your last visit? No      2. Have you seen or consulted any other health care providers outside of the [address] [place] [address] since your last visit? No  Include any pap smears or colon screening. Encouraged pt to discuss pt's wishes with spouse/partner/family and bring them in the next appt to follow thru with the Advanced Directive    @  [address], last 12 mths 12/24/2020   Able to walk? Yes   Fall in past 12 months? 0   Do you feel unsteady? 0   Are you worried about falling 0       3 most recent PHQ Screens 5/19/2022   Little interest or pleasure in doing things Not at all   Feeling down, depressed, irritable, or hopeless Not at all   Total Score PHQ 2 0       Abuse Screening Questionnaire 5/19/2022   Do you ever feel afraid of your partner? N   Are you in a relationship with someone who physically or mentally threatens you? N   Is it safe for you to go home?  Y       ADL Assessment 2/7/2022   Feeding yourself No Help Needed   Getting from bed to chair No Help Needed   Getting dressed No Help Needed   Bathing or showering No Help Needed   Walk across the room (includes cane/walker) No Help Needed   Using the telphone No Help Needed   Taking your medications No Help Needed   Preparing meals No Help Needed   Managing money (expenses/bills) No Help Needed   Moderately strenuous housework (laundry) No Help Needed   Shopping for personal items (toiletries/medicines) No Help Needed   Shopping for groceries No Help Needed   Driving No Help Needed Climbing a flight of stairs No Help Needed   Getting to places beyond walking distances No Help Needed

## 2022-09-19 ENCOUNTER — TELEPHONE (OUTPATIENT)
Dept: INTERNAL MEDICINE CLINIC | Age: 57
End: 2022-09-19

## 2022-09-19 DIAGNOSIS — E11.40 TYPE 2 DIABETES MELLITUS WITH DIABETIC NEUROPATHY, WITHOUT LONG-TERM CURRENT USE OF INSULIN (HCC): ICD-10-CM

## 2022-09-19 RX ORDER — METFORMIN HYDROCHLORIDE 500 MG/1
1000 TABLET ORAL 2 TIMES DAILY WITH MEALS
Qty: 360 TABLET | Refills: 1 | Status: SHIPPED | OUTPATIENT
Start: 2022-09-19

## 2022-11-23 ENCOUNTER — DOCUMENTATION ONLY (OUTPATIENT)
Dept: INTERNAL MEDICINE CLINIC | Age: 57
End: 2022-11-23

## 2022-11-23 ENCOUNTER — VIRTUAL VISIT (OUTPATIENT)
Dept: INTERNAL MEDICINE CLINIC | Age: 57
End: 2022-11-23
Payer: COMMERCIAL

## 2022-11-23 DIAGNOSIS — G89.29 CHRONIC PAIN OF LEFT THUMB: ICD-10-CM

## 2022-11-23 DIAGNOSIS — E11.40 NEUROPATHY DUE TO TYPE 2 DIABETES MELLITUS (HCC): ICD-10-CM

## 2022-11-23 DIAGNOSIS — E78.2 MIXED HYPERLIPIDEMIA: ICD-10-CM

## 2022-11-23 DIAGNOSIS — J44.9 CHRONIC OBSTRUCTIVE PULMONARY DISEASE, UNSPECIFIED COPD TYPE (HCC): ICD-10-CM

## 2022-11-23 DIAGNOSIS — M79.645 CHRONIC PAIN OF LEFT THUMB: ICD-10-CM

## 2022-11-23 DIAGNOSIS — F32.1 MODERATE MAJOR DEPRESSION (HCC): ICD-10-CM

## 2022-11-23 DIAGNOSIS — E11.65 TYPE 2 DIABETES MELLITUS WITH HYPERGLYCEMIA, WITHOUT LONG-TERM CURRENT USE OF INSULIN (HCC): Primary | ICD-10-CM

## 2022-11-23 PROCEDURE — 99214 OFFICE O/P EST MOD 30 MIN: CPT | Performed by: FAMILY MEDICINE

## 2022-11-23 RX ORDER — GABAPENTIN 800 MG/1
800 TABLET ORAL 3 TIMES DAILY
Qty: 90 TABLET | Refills: 1 | Status: SHIPPED | OUTPATIENT
Start: 2022-11-23

## 2022-11-23 NOTE — PROGRESS NOTES
Key: [id number] - PA Case ID: [id number] - Rx #: [id number]  Outcome  This request has been approved using information available on the patient's profile. [id number]; Review Type:Prior Auth; Coverage Start Date:10/24/2022; Coverage End Date:11/23/2023; [employer],called no answer x 2 please note.   Drug  Trulicity 2.43NR/3.6CG pen-injectors  Form  [employer] Electronic PA Form (1113 NCPDP)  Original Claim Info  76

## 2022-11-23 NOTE — PROGRESS NOTES
Lyn Coelho, who was evaluated through a synchronous (real-time) audio-video encounter, and/or her healthcare decision maker, is aware that it is a billable service, which includes applicable co-pays, with coverage as determined by her insurance carrier. She provided verbal consent to proceed and patient identification was verified. This visit was conducted pursuant to the emergency declaration under the [address] [place] [address] and the [employer]. A caregiver was present when appropriate. Ability to conduct physical exam was limited. The patient was located at: [facility] [id number]  The provider was located at: [facility] [address] Manuela Sloan MD on 11/23/2022 at 11:24 AM      We spent about an additional 15 minutes talking about her  who was in the hospital and then discharged to rehab where he is not doing too good. Subjective:     Lyn Coelho is a 62 y.o. female seen for follow-up of diabetes. Requests refills of her gabapentin. She has had hypoglycemic attacks. .no  Blood sugar control has been not so good says running 200s to 300s even occasional 400. Lab Results   Component Value Date/Time    Hemoglobin A1c 7.3 (H) 12/22/2020 03:10 PM    Hemoglobin A1c 7.2 (H) 07/29/2019 10:55 AM    Hemoglobin A1c 7.8 (H) 01/23/2019 03:39 PM    Glucose 162 (H) 12/22/2020 03:10 PM    Glucose (POC) 133 (H) 09/24/2014 04:43 PM    Microalb/Creat ratio (ug/mg creat.) 1.8 07/29/2019 10:31 AM    LDL, calculated 139 (H) 12/22/2020 03:10 PM    LDL, calculated 107 (H) 01/23/2019 03:39 PM    Creatinine 0.92 12/22/2020 03:10 PM       She has diabetes, hypertension, and hyperlipidemia.     Lyn Coelho has the additional concern of  in NH      Diet and Lifestyle: does not rigorously follow a diabetic diet, smoker pack a day . Patient Active Problem List    Diagnosis Date Noted    Caregiver burden 02/07/2022    COPD (chronic obstructive pulmonary disease) ([facility] 11/04/2019    Fatty infiltration of liver 07/29/2019    Pain, chronic 04/22/2019    Type 2 diabetes mellitus with hyperglycemia, without long-term current use of insulin ([facility] 05/08/2018    Type 2 diabetes mellitus with [facility] 03/14/2018    S/P laparoscopic hysterectomy 06/24/2014    Hyperlipidemia 02/04/2013    Anxiety 02/04/2013     Allergies   Allergen Reactions    Codeine Hives     Past Medical History:   Diagnosis Date    Back pain     Degenerative disc disease     Diabetes ([facility]     ; glucose runs 200-300 at home    GERD (gastroesophageal reflux disease)     none recently, takes nexium prn only    Hypercholesteremia     Hypertension     Menopause     Psychiatric disorder     Anxiety; did not take xanax this am     Social History     Tobacco Use    Smoking status: Former     Packs/day: 1.00     Years: 30.00     Pack years: 30.00     Types: Cigarettes    Smokeless tobacco: Former     Quit date: 3/1/2022    Tobacco comments:     Vape User   Substance Use Topics    Alcohol use: No             Review of Systems  Pertinent items are noted in HPI. Objective:     Significant for the following: WNL  Visit Vitals  LMP 06/03/2014     WD WN female NAD    Lab review: no lab studies available for review at time of visit. Assessment/Plan:     Follow-up diabetes control uncertain, needs further observation, needs improvement. Diabetic issues reviewed with her: diabetic diet discussed in detail, written exchange diet given, glycohemoglobin and other lab monitoring discussed, patient urged in the strongest terms to quit smoking, and labs immediately prior to next visit. ICD-10-CM ICD-9-CM    1.  Type 2 diabetes mellitus with hyperglycemia, without long-term current use of insulin ([facility])  A24.48 211.05 METABOLIC PANEL, COMPREHENSIVE     790.29 HEMOGLOBIN A1C WITH EAG      REFERRAL TO DIABETIC EDUCATION      2. Chronic obstructive pulmonary disease, unspecified COPD type ([facility]  J44.9 496       3. Mixed hyperlipidemia  E78.2 272.2 LIPID PANEL      4. Moderate major depression (HCC)  F32.1 296.22       5. Neuropathy due to type 2 diabetes mellitus (HCC)  E11.40 250.60 gabapentin (NEURONTIN) 800 mg tablet     357.2       6. Chronic pain of left thumb  M79.645 729.5     G89.29 338.29         Orders Placed This Encounter    LIPID PANEL     Standing Status:   Future     Standing Expiration Date:   1101    METABOLIC PANEL, COMPREHENSIVE     Standing Status:   Future     Standing Expiration Date:   2023    HEMOGLOBIN A1C WITH EAG     Standing Status:   Future     Standing Expiration Date:   2023    REFERRAL TO DIABETIC EDUCATION     Referral Priority:   Routine     Referral Type:   Consultation     Referral Reason:   Specialty Services Required     Number of Visits Requested:   1    dulaglutide (TRULICITY) 0.92 UY/9.7 mL sub-q pen     Si.5 mL by SubCUTAneous route every seven (7) days. Dispense:  5 Pen     Refill:  5    gabapentin (NEURONTIN) 800 mg tablet     Sig: Take 1 Tablet by mouth three (3) times daily. Max Daily Amount: 2,400 mg. Indications: neuropathic pain     Dispense:  90 Tablet     Refill:  1     Discussed possible side affects, precautions, and drug interactions and possible benefits of the medication(s). Current Outpatient Medications   Medication Sig Dispense Refill    dulaglutide (TRULICITY) 4.71 UX/1.8 mL sub-q pen 0.5 mL by SubCUTAneous route every seven (7) days. 5 Pen 5    gabapentin (NEURONTIN) 800 mg tablet Take 1 Tablet by mouth three (3) times daily. Max Daily Amount: 2,400 mg. Indications: neuropathic pain 90 Tablet 1    metFORMIN (GLUCOPHAGE) 500 mg tablet Take 2 Tablets by mouth two (2) times daily (with meals).  360 Tablet 1    lidocaine (LIDODERM) 5 % 1 Patch by TransDERmal route every twelve (12) hours. Apply patch to the affected area for 12 hours a day and remove for 12 hours a day. 30 Each 5    glipiZIDE (GLUCOTROL) 5 mg tablet Take 1 Tablet by mouth two (2) times a day. 60 Tablet 5    cloNIDine HCL (CATAPRES) 0.1 mg tablet Take 2 Tablets by mouth nightly. 60 Tablet 5    famotidine (PEPCID) 40 mg tablet Take 1 Tablet by mouth daily. 30 Tablet 5    traZODone (DESYREL) 100 mg tablet Take 1 Tablet by mouth nightly. As needed 30 Tablet 5    FLUoxetine (PROzac) 20 mg tablet 1 with meals 90 Tablet 5    cyclobenzaprine (FLEXERIL) 10 mg tablet Take 1 Tablet by mouth three (3) times daily as needed for Muscle Spasm(s). 90 Tablet 5    albuterol (PROVENTIL HFA, VENTOLIN HFA, PROAIR HFA) 90 mcg/actuation inhaler Take 1 Puff by inhalation every four (4) hours as needed for Wheezing. 1 Each 5    diclofenac (VOLTAREN) 1 % gel Apply  to affected area four (4) times daily. 1 Each 5    furosemide (LASIX) 20 mg tablet Daily as needed for swelling 90 Tablet 1    buprenorphine-naloxone (SUBOXONE) 8-2 mg film sublingaul film by SubLINGual route. Chronic Conditions Addressed Today       1. COPD (chronic obstructive pulmonary disease) ([facility]    2. Type 2 diabetes mellitus with hyperglycemia, without long-term current use of insulin (HCC) - Primary     Relevant Medications     dulaglutide (TRULICITY) 6.21 NM/4.2 mL sub-q pen     gabapentin (NEURONTIN) 800 mg tablet     Other Relevant Orders     METABOLIC PANEL, COMPREHENSIVE     HEMOGLOBIN A1C WITH EAG     REFERRAL TO DIABETIC EDUCATION    3.  Hyperlipidemia     Relevant Orders     LIPID PANEL     Acute Diagnoses Addressed Today       Moderate major depression (HCC)            Relevant Medications        gabapentin (NEURONTIN) 800 mg tablet    Neuropathy due to type 2 diabetes mellitus (HCC)            Relevant Medications        dulaglutide (TRULICITY) 0.85 DA/6.9 mL sub-q pen        gabapentin (NEURONTIN) 800 mg tablet    Chronic pain of left thumb Follow-up and Dispositions    Return in about 2 months (around 1/23/2023) for routine follow up.

## 2022-11-23 NOTE — PROGRESS NOTES
Chief Complaint   Patient presents with    Medication Refill     Patient is aware that this is a Virtual Visit or Phone Call Only doctor's visit. Patient has not been out of the country in (14 months), NO diarrhea, NO cough, NO chest conjestion, NO temp. Pt has not been around anyone with these symptoms. Health Maintenance reviewed. I have reviewed the patient's medical history in detail and updated the computerized patient record. Have you been to the ER, urgent care clinic since your last visit? No Hospitalized since your last visit? No     2. Have you seen or consulted any other health care providers outside of the [address] [place] [address] since your last visit? no  Include any pap smears or colon screening. Encouraged pt to discuss pt's wishes with spouse/partner/family and bring them in the next appt to follow thru with the Advanced Directive      Fall Risk Assessment, last 12 mths 12/24/2020   Able to walk? Yes   Fall in past 12 months? 0   Do you feel unsteady? 0   Are you worried about falling 0       3 most recent PHQ Screens 5/19/2022   Little interest or pleasure in doing things Not at all   Feeling down, depressed, irritable, or hopeless Not at all   Total Score PHQ 2 0       Abuse Screening Questionnaire 5/19/2022   Do you ever feel afraid of your partner? N   Are you in a relationship with someone who physically or mentally threatens you? N   Is it safe for you to go home?  Y       ADL Assessment 2/7/2022   Feeding yourself No Help Needed   Getting from bed to chair No Help Needed   Getting dressed No Help Needed   Bathing or showering No Help Needed   Walk across the room (includes cane/walker) No Help Needed   Using the telphone No Help Needed   Taking your medications No Help Needed   Preparing meals No Help Needed   Managing money (expenses/bills) No Help Needed   Moderately strenuous housework (laundry) No Help Needed   Shopping for personal items (toiletries/medicines) No Help Needed   Shopping for groceries No Help Needed   Driving No Help Needed   Climbing a flight of stairs No Help Needed   Getting to places beyond walking distances No Help Needed

## 2023-01-25 ENCOUNTER — VIRTUAL VISIT (OUTPATIENT)
Dept: INTERNAL MEDICINE CLINIC | Age: 58
End: 2023-01-25
Payer: COMMERCIAL

## 2023-01-25 DIAGNOSIS — F32.1 MODERATE MAJOR DEPRESSION (HCC): ICD-10-CM

## 2023-01-25 DIAGNOSIS — E11.40 TYPE 2 DIABETES MELLITUS WITH DIABETIC NEUROPATHY, WITHOUT LONG-TERM CURRENT USE OF INSULIN (HCC): ICD-10-CM

## 2023-01-25 DIAGNOSIS — F43.21 MOURNING: Primary | ICD-10-CM

## 2023-01-25 DIAGNOSIS — E11.40 NEUROPATHY DUE TO TYPE 2 DIABETES MELLITUS (HCC): ICD-10-CM

## 2023-01-25 DIAGNOSIS — K21.9 GASTROESOPHAGEAL REFLUX DISEASE, UNSPECIFIED WHETHER ESOPHAGITIS PRESENT: ICD-10-CM

## 2023-01-25 DIAGNOSIS — J43.9 PULMONARY EMPHYSEMA, UNSPECIFIED EMPHYSEMA TYPE (HCC): ICD-10-CM

## 2023-01-25 DIAGNOSIS — G47.00 INSOMNIA, UNSPECIFIED TYPE: ICD-10-CM

## 2023-01-25 DIAGNOSIS — N95.1 HOT FLASHES DUE TO MENOPAUSE: ICD-10-CM

## 2023-01-25 DIAGNOSIS — E78.2 MIXED HYPERLIPIDEMIA: ICD-10-CM

## 2023-01-25 PROCEDURE — 99214 OFFICE O/P EST MOD 30 MIN: CPT | Performed by: FAMILY MEDICINE

## 2023-01-25 RX ORDER — GLIPIZIDE 5 MG/1
5 TABLET ORAL 2 TIMES DAILY
Qty: 180 TABLET | Refills: 1 | Status: SHIPPED | OUTPATIENT
Start: 2023-01-25

## 2023-01-25 RX ORDER — GABAPENTIN 800 MG/1
800 TABLET ORAL 3 TIMES DAILY
Qty: 90 TABLET | Refills: 1 | Status: SHIPPED | OUTPATIENT
Start: 2023-01-25

## 2023-01-25 RX ORDER — CLONIDINE HYDROCHLORIDE 0.1 MG/1
0.2 TABLET ORAL
Qty: 180 TABLET | Refills: 1 | Status: SHIPPED | OUTPATIENT
Start: 2023-01-25

## 2023-01-25 RX ORDER — FLUOXETINE 20 MG/1
TABLET ORAL
Qty: 90 TABLET | Refills: 5 | Status: SHIPPED | OUTPATIENT
Start: 2023-01-25

## 2023-01-25 RX ORDER — METFORMIN HYDROCHLORIDE 500 MG/1
1000 TABLET ORAL 2 TIMES DAILY WITH MEALS
Qty: 360 TABLET | Refills: 1 | Status: SHIPPED | OUTPATIENT
Start: 2023-01-25

## 2023-01-25 RX ORDER — FUROSEMIDE 20 MG/1
TABLET ORAL
Qty: 90 TABLET | Refills: 1 | Status: SHIPPED | OUTPATIENT
Start: 2023-01-25

## 2023-01-25 RX ORDER — CYCLOBENZAPRINE HCL 10 MG
10 TABLET ORAL
Qty: 90 TABLET | Refills: 2 | Status: SHIPPED | OUTPATIENT
Start: 2023-01-25

## 2023-01-25 RX ORDER — TRAZODONE HYDROCHLORIDE 100 MG/1
100 TABLET ORAL
Qty: 90 TABLET | Refills: 1 | Status: SHIPPED | OUTPATIENT
Start: 2023-01-25

## 2023-01-25 RX ORDER — FAMOTIDINE 40 MG/1
40 TABLET, FILM COATED ORAL DAILY
Qty: 90 TABLET | Refills: 1 | Status: SHIPPED | OUTPATIENT
Start: 2023-01-25

## 2023-01-25 RX ORDER — DIAZEPAM 5 MG/1
5 TABLET ORAL
Qty: 21 TABLET | Refills: 0 | Status: SHIPPED | OUTPATIENT
Start: 2023-01-25 | End: 2023-02-01

## 2023-01-25 NOTE — PROGRESS NOTES
Chief Complaint   Patient presents with    Medication Refill     Patient is aware that this is a Virtual Visit or Phone Call Only doctor's visit. Patient has not been out of the country in (14 months), NO diarrhea, NO cough, NO chest conjestion, NO temp. Pt has not been around anyone with these symptoms. Health Maintenance reviewed. I have reviewed the patient's medical history in detail and updated the computerized patient record. Have you been to the ER, urgent care clinic since your last visit?no   Hospitalized since your last visit?  no    2. Have you seen or consulted any other health care providers outside of the [address] [place] [address] since your last visit? No Include any pap smears or colon screening. Encouraged pt to discuss pt's wishes with spouse/partner/family and bring them in the next appt to follow thru with the Advanced Directive      Fall Risk Assessment, last 12 mths 12/24/2020   Able to walk? Yes   Fall in past 12 months? 0   Do you feel unsteady? 0   Are you worried about falling 0       3 most recent PHQ Screens 1/25/2023   Little interest or pleasure in doing things Several days   Feeling down, depressed, irritable, or hopeless Several days   Total Score PHQ 2 2       Abuse Screening Questionnaire 5/19/2022   Do you ever feel afraid of your partner? N   Are you in a relationship with someone who physically or mentally threatens you? N   Is it safe for you to go home?  Y       ADL Assessment 2/7/2022   Feeding yourself No Help Needed   Getting from bed to chair No Help Needed   Getting dressed No Help Needed   Bathing or showering No Help Needed   Walk across the room (includes cane/walker) No Help Needed   Using the telphone No Help Needed   Taking your medications No Help Needed   Preparing meals No Help Needed   Managing money (expenses/bills) No Help Needed   Moderately strenuous housework (laundry) No Help Needed   Shopping for personal items (toiletries/medicines) No Help Needed   Shopping for groceries No Help Needed   Driving No Help Needed   Climbing a flight of stairs No Help Needed   Getting to places beyond walking distances No Help Needed

## 2023-01-25 NOTE — PROGRESS NOTES
Subjective:     Jeannie Leger is a 62 y.o. female seen for follow-up of diabetes. Now mourning. She has had hypoglycemic attacks. .no  Blood sugar control has been ok    Lab Results   Component Value Date/Time    Hemoglobin A1c 7.3 (H) 12/22/2020 03:10 PM    Hemoglobin A1c 7.2 (H) 07/29/2019 10:55 AM    Hemoglobin A1c 7.8 (H) 01/23/2019 03:39 PM    Glucose 162 (H) 12/22/2020 03:10 PM    Glucose (POC) 133 (H) 09/24/2014 04:43 PM    Microalb/Creat ratio (ug/mg creat.) 1.8 07/29/2019 10:31 AM    LDL, calculated 139 (H) 12/22/2020 03:10 PM    LDL, calculated 107 (H) 01/23/2019 03:39 PM    Creatinine 0.92 12/22/2020 03:10 PM       She has diabetes, hypertension, and hyperlipidemia. Jeannie Leger has the additional concern of  passed away 1/14/22  Ins is running out she's going to apply for disability. Diet and Lifestyle: smoker has quit .     Patient Active Problem List    Diagnosis Date Noted    Caregiver burden 02/07/2022    COPD (chronic obstructive pulmonary disease) ([facility] 11/04/2019    Fatty infiltration of liver 07/29/2019    Pain, chronic 04/22/2019    Type 2 diabetes mellitus with hyperglycemia, without long-term current use of insulin ([address] 05/08/2018    Type 2 diabetes mellitus with diabetic neuropathy ([facility] 03/14/2018    S/P laparoscopic hysterectomy 06/24/2014    Hyperlipidemia 02/04/2013    Anxiety 02/04/2013     Allergies   Allergen Reactions    Codeine Hives     Past Medical History:   Diagnosis Date    Back pain     Degenerative disc disease     Diabetes ([address]     ; glucose runs 200-300 at home    GERD (gastroesophageal reflux disease)     none recently, takes nexium prn only    Hypercholesteremia     Hypertension     Menopause     Psychiatric disorder     Anxiety; did not take xanax this am     Social History     Tobacco Use    Smoking status: Former     Packs/day: 1.00     Years: 30.00     Pack years: 30.00     Types: Cigarettes    Smokeless tobacco: Former     Quit date: 3/1/2022    Tobacco comments:     Vape User   Substance Use Topics    Alcohol use: No             Review of Systems  Pertinent items are noted in HPI. Objective:     Significant for the following: sad    WD WN female NAD        Assessment/Plan:     Follow-up diabetes stable. Diabetic issues reviewed with her: labs immediately prior to next visit. ICD-10-CM ICD-9-CM    1. Mourning  F43.21 309.0 diazePAM (VALIUM) 5 mg tablet      2. Pulmonary emphysema, unspecified emphysema type ([facility]  J43.9 492.8       3. Type 2 diabetes mellitus with diabetic neuropathy, without long-term current use of insulin ([facility])  Y84.34 329.10 METABOLIC PANEL, COMPREHENSIVE     357.2 HEMOGLOBIN A1C WITH EAG      metFORMIN (GLUCOPHAGE) 500 mg tablet      4. Mixed hyperlipidemia  E78.2 272.2 LIPID PANEL      5. Neuropathy due to type 2 diabetes mellitus ([facility])  E11.40 250.60 gabapentin (NEURONTIN) 800 mg tablet     357.2       6. Hot flashes due to menopause  N95.1 627.2 cloNIDine HCL (CATAPRES) 0.1 mg tablet      7. Gastroesophageal reflux disease, unspecified whether esophagitis present  K21.9 530.81 famotidine (PEPCID) 40 mg tablet      cyclobenzaprine (FLEXERIL) 10 mg tablet      8. Insomnia, unspecified type  G47.00 780.52 traZODone (DESYREL) 100 mg tablet      9. Moderate major depression ([facility])  F32.1 296.22 FLUoxetine (PROzac) 20 mg tablet          Orders Placed This Encounter    LIPID PANEL     Standing Status:   Future     Standing Expiration Date:   8/08/1602    METABOLIC PANEL, COMPREHENSIVE     Standing Status:   Future     Standing Expiration Date:   7/28/2023    HEMOGLOBIN A1C WITH EAG     Standing Status:   Future     Standing Expiration Date:   1/25/2024    diazePAM (VALIUM) 5 mg tablet     Sig: Take 1 Tablet by mouth every eight (8) hours as needed for Anxiety for up to 7 days. Max Daily Amount: 15 mg.      Dispense:  21 Tablet     Refill:  0    gabapentin (NEURONTIN) 800 mg tablet     Sig: Take 1 Tablet by mouth three (3) times daily. Max Daily Amount: 2,400 mg. Indications: neuropathic pain     Dispense:  90 Tablet     Refill:  1    metFORMIN (GLUCOPHAGE) 500 mg tablet     Sig: Take 2 Tablets by mouth two (2) times daily (with meals). Dispense:  360 Tablet     Refill:  1    glipiZIDE (GLUCOTROL) 5 mg tablet     Sig: Take 1 Tablet by mouth two (2) times a day. Dispense:  180 Tablet     Refill:  1    cloNIDine HCL (CATAPRES) 0.1 mg tablet     Sig: Take 2 Tablets by mouth nightly. Dispense:  180 Tablet     Refill:  1    famotidine (PEPCID) 40 mg tablet     Sig: Take 1 Tablet by mouth daily. Dispense:  90 Tablet     Refill:  1    traZODone (DESYREL) 100 mg tablet     Sig: Take 1 Tablet by mouth nightly. As needed     Dispense:  90 Tablet     Refill:  1    FLUoxetine (PROzac) 20 mg tablet     Si with meals     Dispense:  90 Tablet     Refill:  5    cyclobenzaprine (FLEXERIL) 10 mg tablet     Sig: Take 1 Tablet by mouth three (3) times daily as needed for Muscle Spasm(s). Dispense:  90 Tablet     Refill:  2    furosemide (LASIX) 20 mg tablet     Sig: Daily as needed for swelling     Dispense:  90 Tablet     Refill:  1    dulaglutide (TRULICITY) 6.67 RZ/3.3 mL sub-q pen     Si.5 mL by SubCUTAneous route every seven (7) days. Dispense:  10 Pen     Refill:  5       We discussed this pain and the usage of controlled substances for mouring relief. In general these medications are indicated for the acute symptom relief and not for chronic usage, allthough they are frequently used for chronic management  After a certain period of time they should be stopped to avoid dependence and/or addiction. I warned her about the dangers of addiction and other side affects including respiratory depression and death. Discussed how this is a controlled substance and that the prescribing of it is should be monitored very carefully.  Drug usage is also monitored by our practise and the [facility], since there has been a lot of abuse and diversion of controlled substances. In general we do not refill this medication over the phone. PLease ask for enough pills to get you to your next appointment and make sure you keep your appointments. Warned not to take other medications like alcohol, other benzodiazapines marijuana or other narcotics when on this medication. anxiety          Chronic Conditions Addressed Today       1. COPD (chronic obstructive pulmonary disease) ([facility]    2. Type 2 diabetes mellitus with diabetic neuropathy (HCC)     Relevant Medications     diazePAM (VALIUM) 5 mg tablet     gabapentin (NEURONTIN) 800 mg tablet     metFORMIN (GLUCOPHAGE) 500 mg tablet     glipiZIDE (GLUCOTROL) 5 mg tablet     traZODone (DESYREL) 100 mg tablet     FLUoxetine (PROzac) 20 mg tablet     cyclobenzaprine (FLEXERIL) 10 mg tablet     dulaglutide (TRULICITY) 0.94 EF/8.7 mL sub-q pen     Other Relevant Orders     METABOLIC PANEL, COMPREHENSIVE     HEMOGLOBIN A1C WITH EAG    3.  Hyperlipidemia     Relevant Orders     LIPID PANEL     Acute Diagnoses Addressed Today       Mourning    -  Primary        Relevant Medications        diazePAM (VALIUM) 5 mg tablet    Neuropathy due to type 2 diabetes mellitus (HCC)            Relevant Medications        diazePAM (VALIUM) 5 mg tablet        gabapentin (NEURONTIN) 800 mg tablet        metFORMIN (GLUCOPHAGE) 500 mg tablet        glipiZIDE (GLUCOTROL) 5 mg tablet        traZODone (DESYREL) 100 mg tablet        FLUoxetine (PROzac) 20 mg tablet        cyclobenzaprine (FLEXERIL) 10 mg tablet        dulaglutide (TRULICITY) 3.16 TI/8.1 mL sub-q pen    Hot flashes due to menopause            Relevant Medications        metFORMIN (GLUCOPHAGE) 500 mg tablet        cloNIDine HCL (CATAPRES) 0.1 mg tablet    Gastroesophageal reflux disease, unspecified whether esophagitis present            Relevant Medications        famotidine (PEPCID) 40 mg tablet        cyclobenzaprine (FLEXERIL) 10 mg tablet    Insomnia, unspecified type Relevant Medications        traZODone (DESYREL) 100 mg tablet    Moderate major depression (HCC)            Relevant Medications        diazePAM (VALIUM) 5 mg tablet        gabapentin (NEURONTIN) 800 mg tablet        traZODone (DESYREL) 100 mg tablet        FLUoxetine (PROzac) 20 mg tablet            Discussed possible side affects, precautions, and drug interactions and possible benefits of the medication(s).       F/up 3 mo

## 2023-02-16 ENCOUNTER — DOCUMENTATION ONLY (OUTPATIENT)
Dept: INTERNAL MEDICINE CLINIC | Age: 58
End: 2023-02-16

## 2023-02-16 ENCOUNTER — VIRTUAL VISIT (OUTPATIENT)
Dept: INTERNAL MEDICINE CLINIC | Age: 58
End: 2023-02-16
Payer: COMMERCIAL

## 2023-02-16 DIAGNOSIS — G43.001 MIGRAINE WITHOUT AURA AND WITH STATUS MIGRAINOSUS, NOT INTRACTABLE: Primary | ICD-10-CM

## 2023-02-16 PROCEDURE — 99442 PR PHYS/QHP TELEPHONE EVALUATION 11-20 MIN: CPT | Performed by: FAMILY MEDICINE

## 2023-02-16 RX ORDER — BUTALBITAL, ACETAMINOPHEN AND CAFFEINE 50; 325; 40 MG/1; MG/1; MG/1
1 TABLET ORAL
Qty: 20 TABLET | Refills: 0 | Status: SHIPPED | OUTPATIENT
Start: 2023-02-16

## 2023-02-16 RX ORDER — SUMATRIPTAN 100 MG/1
100 TABLET, FILM COATED ORAL
Qty: 10 TABLET | Refills: 2 | Status: SHIPPED | OUTPATIENT
Start: 2023-02-16 | End: 2023-02-16

## 2023-02-16 NOTE — PROGRESS NOTES
Key: [id number] - Rx #: [id number]  Outcome  Additional Information Required  Message from [employer]: Drug is not covered by plan  Drug  SUMAtriptan Succinate 100MG tablets  Form  [employer] Electronic PA Form (2017 NCPDP)  Original Claim Info  70 DIABETIC, CHEMO IV COVERAGE ONLY,lluvia by [employer]. Plan called directly at [phone]. Maxim TOVAR,stated need to check with patient for general medicine coverage . [facility] stated I will run it thru another plan. Please follow up as needed. Thanks

## 2023-02-16 NOTE — PROGRESS NOTES
Connie Navarro (: 1965) is a 62 y.o. female, established patient, here for evaluation of the following chief complaint(s):   No chief complaint on file. ASSESSMENT/PLAN:  Below is the assessment and plan developed based on review of pertinent history, labs, studies, and medications. ICD-10-CM ICD-9-CM    1. Migraine without aura and with status migrainosus, not intractable  G43.001 346.12 butalbital-acetaminophen-caffeine (FIORICET, ESGIC) -40 mg per tablet      SUMAtriptan (IMITREX) 100 mg tablet          Orders Placed This Encounter    butalbital-acetaminophen-caffeine (FIORICET, ESGIC) -40 mg per tablet     Sig: Take 1 Tablet by mouth every six (6) hours as needed for Headache. Dispense:  20 Tablet     Refill:  0    SUMAtriptan (IMITREX) 100 mg tablet     Sig: Take 1 Tablet by mouth once as needed for Migraine for up to 1 dose. Dispense:  10 Tablet     Refill:  2     Discussed possible side affects, precautions, and drug interactions and possible benefits of the medication(s). F/up 1 to 2 weeks, needs to be in office    SUBJECTIVE/OBJECTIVE:  HPI    Been having more headaches since her  passed away. Request a refill of Fioricet which she is taken in the past.  Unsure if she has had Imitrex before.     Review of Systems   No focal weakness or head trauma    Patient Active Problem List   Diagnosis Code    Hyperlipidemia E78.5    Anxiety F41.9    S/P laparoscopic hysterectomy Z90.710    Type 2 diabetes mellitus with diabetic neuropathy (HCC) E11.40    Type 2 diabetes mellitus with hyperglycemia, without long-term current use of insulin (HCC) E11.65    Pain, chronic G89.29    Fatty infiltration of liver K76.0    COPD (chronic obstructive pulmonary disease) ([facility] J44.9    Caregiver burden Z63.6         Physical Exam    Phone call only visit    On this date 2023 I have spent 11 minutes reviewing previous notes, test results and face to face (virtual) with the patient discussing the diagnosis and importance of compliance with the treatment plan as well as documenting on the day of the visit. Bess Lawanda, was evaluated through a synchronous (real-time) audio inly encounter. The patient (or guardian if applicable) is aware that this is a billable service, which includes applicable co-pays. This Virtual Visit was conducted with patient's (and/or legal guardian's) consent. The visit was conducted pursuant to the emergency declaration under the [facility], [id number] waiver authority and the [employer] Act. Patient identification was verified, and a caregiver was present when appropriate. The patient was located at: Home: [place] [id number]  The provider was located at: [facility] [place] [address]  [address]       An electronic signature was used to authenticate this note.   -- Juarez Cabrera MD

## 2023-02-25 NOTE — PROGRESS NOTES
Subjective:     Rogelio Spain is a 62 y.o. female seen for follow-up of diabetes. She has had hypoglycemic attacks. .no  Blood sugar control has been poor 400's or higher then will take her Dm meds, can't afford trulicity which is quite expensive. Her insurance is changed. A1c is 11.6  Lab Results   Component Value Date/Time    Hemoglobin A1c 7.3 (H) 12/22/2020 03:10 PM    Hemoglobin A1c 7.2 (H) 07/29/2019 10:55 AM    Hemoglobin A1c 7.8 (H) 01/23/2019 03:39 PM    Glucose 162 (H) 12/22/2020 03:10 PM    Glucose (POC) 133 (H) 09/24/2014 04:43 PM    Microalb/Creat ratio (ug/mg creat.) 1.8 07/29/2019 10:31 AM    LDL, calculated 139 (H) 12/22/2020 03:10 PM    LDL, calculated 107 (H) 01/23/2019 03:39 PM    Creatinine 0.92 12/22/2020 03:10 PM       She has diabetes, hypertension, and hyperlipidemia. Rogelio Spain has the additional concern of still a lot of stress her  passed away and she is having to deal with inheritance/property issues, asks for more Valium. Reports taking blood pressure medications without side affects. No complaints of exertional chest pain, excessive shortness of breath or focal weakness. Minimal swelling in lower legs or dizziness with standing. Diet and Lifestyle: smoker just occasionally but mainly vapes her nicotine .     Patient Active Problem List    Diagnosis Date Noted    Caregiver burden 02/07/2022    COPD (chronic obstructive pulmonary disease) ([facility] 11/04/2019    Fatty infiltration of liver 07/29/2019    Pain, chronic 04/22/2019    Type 2 diabetes mellitus with hyperglycemia, without long-term current use of insulin ([address] 05/08/2018    Type 2 diabetes mellitus with diabetic neuropathy ([address] 03/14/2018    S/P laparoscopic hysterectomy 06/24/2014    Hyperlipidemia 02/04/2013    Anxiety 02/04/2013     Allergies   Allergen Reactions    Codeine Hives     Past Medical History:   Diagnosis Date    Back pain     Degenerative disc disease     Diabetes ([address]     ; glucose runs 200-300 at home    GERD (gastroesophageal reflux disease)     none recently, takes nexium prn only    Hypercholesteremia     Hypertension     Menopause     Psychiatric disorder     Anxiety; did not take xanax this am     Social History     Tobacco Use    Smoking status: Former     Packs/day: 1.00     Years: 30.00     Pack years: 30.00     Types: Cigarettes    Smokeless tobacco: Former     Quit date: 3/1/2022    Tobacco comments:     Vape User   Substance Use Topics    Alcohol use: No             Review of Systems  Pertinent items are noted in HPI. Objective:     Significant for the following: Visit Vitals  /79 (BP 1 Location: Left arm, BP Patient Position: Sitting, BP Cuff Size: Adult)   Pulse (!) 110   Temp 98.4 °F (36.9 °C) (Temporal)   Resp 16   Ht 5' 5.5\" (1.664 m)   Wt 154 lb (69.9 kg)   LMP 06/03/2014   SpO2 95%   BMI 25.24 kg/m²         WD WN female NAD        Assessment/Plan:     Follow-up diabetes poorly controlled, loss of control due to intercurrent illness, needs further observation, needs improvement. Diabetic issues reviewed with her: all medications, side effects and compliance discussed carefully and glycohemoglobin and other lab monitoring discussed. ICD-10-CM ICD-9-CM    1. Type 2 diabetes mellitus with hyperglycemia, without long-term current use of insulin (HCC)  E11.65 250.00 AMB POC HEMOGLOBIN A1C     790.29 tirzepatide (Mounjaro) 2.5 mg/0.5 mL pnij      REFERRAL TO DIABETIC EDUCATION      2. Positive depression screening  Z13.31 796.4       3. Chronic obstructive pulmonary disease, unspecified COPD type ([facility]  J44.9 496       4. Chronic pain syndrome  G89.4 338.4       5. Moderate major depression (HCC)  F32.1 296.22       6. Hip pain  M25.559 719.45 XR HIPS BI W PELV MIN 5 VWS      7.  Anxiety reaction  F41.1 300.00 diazePAM (VALIUM) 2 mg tablet          Discussed possible side affects, precautions, and drug interactions and possible benefits of the medication(s). Can try and get [facility] but suspect it would be really expensive as well if she cannot get it get Actos. Okay another course of Valium  X-rays as above    Current Outpatient Medications   Medication Sig Dispense Refill    tirzepatide (Mounjaro) 2.5 mg/0.5 mL pnij 2.5 mg by SubCUTAneous route every seven (7) days. 1 Box 5    pioglitazone (ACTOS) 15 mg tablet Take 1 Tablet by mouth daily. 30 Tablet 5    diazePAM (VALIUM) 2 mg tablet Take 1 Tablet by mouth every eight (8) hours as needed for Anxiety. Max Daily Amount: 6 mg. 20 Tablet 0    butalbital-acetaminophen-caffeine (FIORICET, ESGIC) -40 mg per tablet Take 1 Tablet by mouth every six (6) hours as needed for Headache. 20 Tablet 0    gabapentin (NEURONTIN) 800 mg tablet Take 1 Tablet by mouth three (3) times daily. Max Daily Amount: 2,400 mg. Indications: neuropathic pain 90 Tablet 1    metFORMIN (GLUCOPHAGE) 500 mg tablet Take 2 Tablets by mouth two (2) times daily (with meals). 360 Tablet 1    cloNIDine HCL (CATAPRES) 0.1 mg tablet Take 2 Tablets by mouth nightly. 180 Tablet 1    famotidine (PEPCID) 40 mg tablet Take 1 Tablet by mouth daily. 90 Tablet 1    traZODone (DESYREL) 100 mg tablet Take 1 Tablet by mouth nightly. As needed 90 Tablet 1    FLUoxetine (PROzac) 20 mg tablet 1 with meals 90 Tablet 5    cyclobenzaprine (FLEXERIL) 10 mg tablet Take 1 Tablet by mouth three (3) times daily as needed for Muscle Spasm(s). 90 Tablet 2    furosemide (LASIX) 20 mg tablet Daily as needed for swelling 90 Tablet 1    lidocaine (LIDODERM) 5 % 1 Patch by TransDERmal route every twelve (12) hours. Apply patch to the affected area for 12 hours a day and remove for 12 hours a day. 30 Each 5    albuterol (PROVENTIL HFA, VENTOLIN HFA, PROAIR HFA) 90 mcg/actuation inhaler Take 1 Puff by inhalation every four (4) hours as needed for Wheezing. 1 Each 5    diclofenac (VOLTAREN) 1 % gel Apply  to affected area four (4) times daily.  1 Each 5 buprenorphine-naloxone (SUBOXONE) 8-2 mg film sublingaul film by SubLINGual route. Chronic Conditions Addressed Today       1. COPD (chronic obstructive pulmonary disease) ([facility])    2. Pain, chronic    3.  Type 2 diabetes mellitus with hyperglycemia, without long-term current use of insulin ([facility]) - Primary     Relevant Medications     tirzepatide (Mounjaro) 2.5 mg/0.5 mL pnij     pioglitazone (ACTOS) 15 mg tablet     Other Relevant Orders     AMB POC HEMOGLOBIN A1C (Completed)     REFERRAL TO DIABETIC EDUCATION     Acute Diagnoses Addressed Today       Positive depression screening        Moderate major depression ([facility])            Relevant Medications        diazePAM (VALIUM) 2 mg tablet    Hip pain            Relevant Orders        XR HIPS BI W PELV MIN 5 VWS    Anxiety reaction            Relevant Medications        diazePAM (VALIUM) 2 mg tablet          Follow-up 2 months

## 2023-02-28 ENCOUNTER — OFFICE VISIT (OUTPATIENT)
Dept: INTERNAL MEDICINE CLINIC | Age: 58
End: 2023-02-28

## 2023-02-28 VITALS
BODY MASS INDEX: 24.75 KG/M2 | HEIGHT: 66 IN | RESPIRATION RATE: 16 BRPM | TEMPERATURE: 98.4 F | OXYGEN SATURATION: 95 % | HEART RATE: 110 BPM | SYSTOLIC BLOOD PRESSURE: 121 MMHG | WEIGHT: 154 LBS | DIASTOLIC BLOOD PRESSURE: 79 MMHG

## 2023-02-28 DIAGNOSIS — E11.65 TYPE 2 DIABETES MELLITUS WITH HYPERGLYCEMIA, WITHOUT LONG-TERM CURRENT USE OF INSULIN (HCC): Primary | ICD-10-CM

## 2023-02-28 DIAGNOSIS — F41.1 ANXIETY REACTION: ICD-10-CM

## 2023-02-28 DIAGNOSIS — F32.1 MODERATE MAJOR DEPRESSION (HCC): ICD-10-CM

## 2023-02-28 DIAGNOSIS — J44.9 CHRONIC OBSTRUCTIVE PULMONARY DISEASE, UNSPECIFIED COPD TYPE (HCC): ICD-10-CM

## 2023-02-28 DIAGNOSIS — M25.559 HIP PAIN: ICD-10-CM

## 2023-02-28 DIAGNOSIS — G89.4 CHRONIC PAIN SYNDROME: ICD-10-CM

## 2023-02-28 DIAGNOSIS — Z13.31 POSITIVE DEPRESSION SCREENING: ICD-10-CM

## 2023-02-28 LAB — HBA1C MFR BLD HPLC: 11.6 %

## 2023-02-28 RX ORDER — PIOGLITAZONEHYDROCHLORIDE 15 MG/1
15 TABLET ORAL DAILY
Qty: 30 TABLET | Refills: 5 | Status: SHIPPED | OUTPATIENT
Start: 2023-02-28

## 2023-02-28 RX ORDER — TIRZEPATIDE 2.5 MG/.5ML
2.5 INJECTION, SOLUTION SUBCUTANEOUS
Qty: 1 BOX | Refills: 5 | Status: SHIPPED | OUTPATIENT
Start: 2023-02-28

## 2023-02-28 RX ORDER — DIAZEPAM 2 MG/1
2 TABLET ORAL
Qty: 20 TABLET | Refills: 0 | Status: SHIPPED | OUTPATIENT
Start: 2023-02-28

## 2023-02-28 NOTE — PROGRESS NOTES
Chief Complaint   Patient presents with    Numbness     Numbness to tips of fingers and Left thigh     Patient has not been out of the country in (14 months), NO diarrhea, NO cough, NO chest conjestion, NO temp. Pt has not been around anyone with these symptoms. Health Maintenance reviewed. I have reviewed the patient's medical history in detail and updated the computerized patient record. 1. \"Have you been to the ER, urgent care clinic since your last visit? No Hospitalized since your last visit? \" no    2. \"Have you seen or consulted any other health care providers outside of the [address] [place] [address] since your last visit? \"  no    3. For patients aged 39-70: Has the patient had a colonoscopy / FIT/ Cologuard? no      If the patient is female:    4. For patients aged 41-77: Has the patient had a mammogram within the past 2 years? no      5. For patients aged 21-65: Has the patient had a pap smear? no     Encouraged pt to discuss pt's wishes with spouse/partner/family and bring them in the next appt to follow thru with the Advanced Directive    @  Fall Risk Assessment, last 12 mths 12/24/2020   Able to walk? Yes   Fall in past 12 months? 0   Do you feel unsteady?  0   Are you worried about falling 0       3 most recent PHQ Screens 2/28/2023   Little interest or pleasure in doing things Nearly every day   Feeling down, depressed, irritable, or hopeless Nearly every day   Total Score PHQ 2 6   Trouble falling or staying asleep, or sleeping too much Several days   Feeling tired or having little energy Nearly every day   Poor appetite, weight loss, or overeating Nearly every day   Feeling bad about yourself - or that you are a failure or have let yourself or your family down Not at all   Trouble concentrating on things such as school, work, reading, or watching TV Several days   Moving or speaking so slowly that other people could have noticed; or the opposite being so fidgety that others notice Several days   Thoughts of being better off dead, or hurting yourself in some way Not at all   PHQ 9 Score 15   How difficult have these problems made it for you to do your work, take care of your home and get along with others Extremely difficult       Abuse Screening Questionnaire 2/28/2023   Do you ever feel afraid of your partner? N   Are you in a relationship with someone who physically or mentally threatens you? N   Is it safe for you to go home?  Y       ADL Assessment 2/28/2023   Feeding yourself No Help Needed   Getting from bed to chair No Help Needed   Getting dressed No Help Needed   Bathing or showering No Help Needed   Walk across the room (includes cane/walker) No Help Needed   Using the telphone No Help Needed   Taking your medications No Help Needed   Preparing meals No Help Needed   Managing money (expenses/bills) No Help Needed   Moderately strenuous housework (laundry) No Help Needed   Shopping for personal items (toiletries/medicines) No Help Needed   Shopping for groceries No Help Needed   Driving No Help Needed   Climbing a flight of stairs No Help Needed   Getting to places beyond walking distances No Help Needed

## 2023-03-10 ENCOUNTER — TELEPHONE (OUTPATIENT)
Dept: INTERNAL MEDICINE CLINIC | Age: 58
End: 2023-03-10

## 2023-03-22 ENCOUNTER — VIRTUAL VISIT (OUTPATIENT)
Dept: INTERNAL MEDICINE CLINIC | Age: 58
End: 2023-03-22

## 2023-03-22 DIAGNOSIS — M54.32 BILATERAL SCIATICA: ICD-10-CM

## 2023-03-22 DIAGNOSIS — E11.40 TYPE 2 DIABETES MELLITUS WITH DIABETIC NEUROPATHY, WITHOUT LONG-TERM CURRENT USE OF INSULIN (HCC): Primary | ICD-10-CM

## 2023-03-22 DIAGNOSIS — M54.31 BILATERAL SCIATICA: ICD-10-CM

## 2023-03-22 DIAGNOSIS — M17.10 ARTHRITIS OF KNEE: ICD-10-CM

## 2023-03-22 DIAGNOSIS — E11.65 TYPE 2 DIABETES MELLITUS WITH HYPERGLYCEMIA, WITHOUT LONG-TERM CURRENT USE OF INSULIN (HCC): ICD-10-CM

## 2023-03-22 DIAGNOSIS — E11.40 NEUROPATHY DUE TO TYPE 2 DIABETES MELLITUS (HCC): ICD-10-CM

## 2023-03-22 RX ORDER — PIOGLITAZONEHYDROCHLORIDE 15 MG/1
30 TABLET ORAL DAILY
Qty: 30 TABLET | Refills: 5 | Status: SHIPPED | OUTPATIENT
Start: 2023-03-22 | End: 2023-03-22

## 2023-03-22 RX ORDER — GABAPENTIN 800 MG/1
800 TABLET ORAL 3 TIMES DAILY
Qty: 90 TABLET | Refills: 1 | Status: SHIPPED | OUTPATIENT
Start: 2023-03-22

## 2023-03-22 RX ORDER — TIRZEPATIDE 2.5 MG/.5ML
2.5 INJECTION, SOLUTION SUBCUTANEOUS
Qty: 1 BOX | Refills: 5 | Status: SHIPPED | OUTPATIENT
Start: 2023-03-22 | End: 2023-03-22 | Stop reason: SDUPTHER

## 2023-03-22 RX ORDER — PIOGLITAZONEHYDROCHLORIDE 30 MG/1
30 TABLET ORAL DAILY
Qty: 30 TABLET | Refills: 5 | Status: SHIPPED | OUTPATIENT
Start: 2023-03-22

## 2023-03-22 RX ORDER — TIRZEPATIDE 2.5 MG/.5ML
2.5 INJECTION, SOLUTION SUBCUTANEOUS
Qty: 1 BOX | Refills: 5 | Status: SHIPPED | OUTPATIENT
Start: 2023-03-22

## 2023-03-22 NOTE — PROGRESS NOTES
Lyn Coelho, who was evaluated through a synchronous (real-time) audio-video encounter, and/or her healthcare decision maker, is aware that it is a billable service, which includes applicable co-pays, with coverage as determined by her insurance carrier. She provided verbal consent to proceed and patient identification was verified. This visit was conducted pursuant to the emergency declaration under the [address] [place] [address] and the [employer] and [email or web address] General Act. A caregiver was present when appropriate. Ability to conduct physical exam was limited. The patient was located at: Home: [place] [id number]  The provider was located at: [facility] [address] Manuela Aguilar MD on 3/22/2023 at 3:52 PM          Subjective:     Lyn Coelho is a 62 y.o. female seen for follow-up of diabetes. She has had hypoglycemic attacks. .no  Blood sugar control has been running high, Ozempic was too expensive wants to try Mounjaro. Lab Results   Component Value Date/Time    Hemoglobin A1c 7.3 (H) 2020 03:10 PM    Hemoglobin A1c 7.2 (H) 2019 10:55 AM    Hemoglobin A1c 7.8 (H) 2019 03:39 PM    Glucose 162 (H) 2020 03:10 PM    Glucose (POC) 133 (H) 2014 04:43 PM    Microalb/Creat ratio (ug/mg creat.) 1.8 2019 10:31 AM    LDL, calculated 139 (H) 2020 03:10 PM    LDL, calculated 107 (H) 2019 03:39 PM    Creatinine 0.92 2020 03:10 PM       She has diabetes, hypertension, and hyperlipidemia. Lyn Coelho has the additional concern of glu run hi 300's  Post Ins now that  . Asks abour re-applying for disability    Reports taking blood pressure medications without side affects. No complaints of exertional chest pain, excessive shortness of breath or focal weakness.  Minimal swelling in lower legs or dizziness with standing. Diet and Lifestyle: follows a diabetic diet regularly. Patient Active Problem List    Diagnosis Date Noted    Caregiver burden 02/07/2022    COPD (chronic obstructive pulmonary disease) ([facility] 11/04/2019    Fatty infiltration of liver 07/29/2019    Pain, chronic 04/22/2019    Type 2 diabetes mellitus with hyperglycemia, without long-term current use of insulin ([facility] 05/08/2018    Type 2 diabetes mellitus with diabetic neuropathy ([facility] 03/14/2018    S/P laparoscopic hysterectomy 06/24/2014    Hyperlipidemia 02/04/2013    Anxiety 02/04/2013     Current Outpatient Medications   Medication Sig Dispense Refill    pioglitazone (ACTOS) 30 mg tablet Take 1 Tablet by mouth daily. 30 Tablet 5    tirzepatide (Mounjaro) 2.5 mg/0.5 mL pnij 2.5 mg by SubCUTAneous route every seven (7) days. For diabetes 1 Box 5    gabapentin (NEURONTIN) 800 mg tablet Take 1 Tablet by mouth three (3) times daily. Max Daily Amount: 2,400 mg. Indications: neuropathic pain 90 Tablet 1    butalbital-acetaminophen-caffeine (FIORICET, ESGIC) -40 mg per tablet Take 1 Tablet by mouth every six (6) hours as needed for Headache. 20 Tablet 0    metFORMIN (GLUCOPHAGE) 500 mg tablet Take 2 Tablets by mouth two (2) times daily (with meals). 360 Tablet 1    cloNIDine HCL (CATAPRES) 0.1 mg tablet Take 2 Tablets by mouth nightly. 180 Tablet 1    famotidine (PEPCID) 40 mg tablet Take 1 Tablet by mouth daily. 90 Tablet 1    traZODone (DESYREL) 100 mg tablet Take 1 Tablet by mouth nightly. As needed 90 Tablet 1    FLUoxetine (PROzac) 20 mg tablet 1 with meals 90 Tablet 5    cyclobenzaprine (FLEXERIL) 10 mg tablet Take 1 Tablet by mouth three (3) times daily as needed for Muscle Spasm(s). 90 Tablet 2    furosemide (LASIX) 20 mg tablet Daily as needed for swelling 90 Tablet 1    lidocaine (LIDODERM) 5 % 1 Patch by TransDERmal route every twelve (12) hours.  Apply patch to the affected area for 12 hours a day and remove for 12 hours a day. 30 Each 5    albuterol (PROVENTIL HFA, VENTOLIN HFA, PROAIR HFA) 90 mcg/actuation inhaler Take 1 Puff by inhalation every four (4) hours as needed for Wheezing. 1 Each 5    diclofenac (VOLTAREN) 1 % gel Apply  to affected area four (4) times daily. 1 Each 5    buprenorphine-naloxone (SUBOXONE) 8-2 mg film sublingaul film by SubLINGual route. Allergies   Allergen Reactions    Codeine Hives     Past Medical History:   Diagnosis Date    Back pain     Degenerative disc disease     Diabetes ([address]     ; glucose runs 200-300 at home    GERD (gastroesophageal reflux disease)     none recently, takes nexium prn only    Hypercholesteremia     Hypertension     Menopause     Psychiatric disorder     Anxiety; did not take xanax this am     Social History     Tobacco Use    Smoking status: Former     Packs/day: 1.00     Years: 30.00     Pack years: 30.00     Types: Cigarettes     Passive exposure: Never    Smokeless tobacco: Former     Quit date: 3/1/2022    Tobacco comments:     Vape User   Substance Use Topics    Alcohol use: No             Review of Systems  Pertinent items are noted in HPI. Objective:     Significant for the following: anxious    WD WN female NAD    Appears to be in no acute distress, grossly 2 through 12 are nonfocal.      Assessment/Plan:     Follow-up diabetes poorly controlled, loss of control due to intercurrent illness, needs further observation, needs improvement. Diabetic issues reviewed with her: all medications, side effects and compliance discussed carefully, glycohemoglobin and other lab monitoring discussed, patient urged in the strongest terms to quit smoking, and labs immediately prior to next visit. ICD-10-CM ICD-9-CM    1.  Type 2 diabetes mellitus with diabetic neuropathy, without long-term current use of insulin ([facility])  E11.40 250.60 tirzepatide (Mounjaro) 2.5 mg/0.5 mL pnij     357.2 DISCONTINUED: tirzepatide (Mounjaro) 2.5 mg/0.5 mL pnij 2. Type 2 diabetes mellitus with hyperglycemia, without long-term current use of insulin ([facility])  E11.65 250.00 tirzepatide (Mounjaro) 2.5 mg/0.5 mL pnij     790.29 DISCONTINUED: tirzepatide (Mounjaro) 2.5 mg/0.5 mL pnij      3. Neuropathy due to type 2 diabetes mellitus ([facility])  E11.40 250.60 gabapentin (NEURONTIN) 800 mg tablet     357.2       4. Arthritis of knee  M17.10 716.96       5. Bilateral sciatica  M54.31 724.3     M54.32          Orders Placed This Encounter    DISCONTD: tirzepatide (Mounjaro) 2.5 mg/0.5 mL pnij     Si.5 mg by SubCUTAneous route every seven (7) days. For diabetes     Dispense:  1 Box     Refill:  5    DISCONTD: pioglitazone (ACTOS) 15 mg tablet     Sig: Take 2 Tablets by mouth daily. Dispense:  30 Tablet     Refill:  5    pioglitazone (ACTOS) 30 mg tablet     Sig: Take 1 Tablet by mouth daily. Dispense:  30 Tablet     Refill:  5    tirzepatide (Mounjaro) 2.5 mg/0.5 mL pnij     Si.5 mg by SubCUTAneous route every seven (7) days. For diabetes     Dispense:  1 Box     Refill:  5    gabapentin (NEURONTIN) 800 mg tablet     Sig: Take 1 Tablet by mouth three (3) times daily. Max Daily Amount: 2,400 mg. Indications: neuropathic pain     Dispense:  90 Tablet     Refill:  1     Disc disablity  Discussed insurance coverage of the medication/referral/procedure. Prior authorization likely will be needed, which will cause a delay. There may be significant out-of-pocket expenses even if insurance covers.   Okay refill of gabapentin  Told her to try and get Medicaid    Follow-up 1 month

## 2023-03-22 NOTE — PROGRESS NOTES
Chief Complaint   Patient presents with    Medication Evaluation     Patient is aware that this is a Virtual Visit or Phone Call Only doctor's visit. Patient has not been out of the country in (14 months), NO diarrhea, NO cough, NO chest conjestion, NO temp. Pt has not been around anyone with these symptoms. Health Maintenance reviewed. I have reviewed the patient's medical history in detail and updated the computerized patient record. Have you been to the ER, urgent care clinic since your last visit? no  Hospitalized since your last visit?  no    2. Have you seen or consulted any other health care providers outside of the [address] [place] [address] since your last visit? no Include any pap smears or colon screening. Encouraged pt to discuss pt's wishes with spouse/partner/family and bring them in the next appt to follow thru with the Advanced Directive      Fall Risk Assessment, last 12 mths 12/24/2020   Able to walk? Yes   Fall in past 12 months? 0   Do you feel unsteady?  0   Are you worried about falling 0       3 most recent PHQ Screens 2/28/2023   Little interest or pleasure in doing things Nearly every day   Feeling down, depressed, irritable, or hopeless Nearly every day   Total Score PHQ 2 6   Trouble falling or staying asleep, or sleeping too much Several days   Feeling tired or having little energy Nearly every day   Poor appetite, weight loss, or overeating Nearly every day   Feeling bad about yourself - or that you are a failure or have let yourself or your family down Not at all   Trouble concentrating on things such as school, work, reading, or watching TV Several days   Moving or speaking so slowly that other people could have noticed; or the opposite being so fidgety that others notice Several days   Thoughts of being better off dead, or hurting yourself in some way Not at all   PHQ 9 Score 15   How difficult have these problems made it for you to do your work, take care of your home and get along with others Extremely difficult       Abuse Screening Questionnaire 2/28/2023   Do you ever feel afraid of your partner? N   Are you in a relationship with someone who physically or mentally threatens you? N   Is it safe for you to go home?  Y       ADL Assessment 2/28/2023   Feeding yourself No Help Needed   Getting from bed to chair No Help Needed   Getting dressed No Help Needed   Bathing or showering No Help Needed   Walk across the room (includes cane/walker) No Help Needed   Using the telphone No Help Needed   Taking your medications No Help Needed   Preparing meals No Help Needed   Managing money (expenses/bills) No Help Needed   Moderately strenuous housework (laundry) No Help Needed   Shopping for personal items (toiletries/medicines) No Help Needed   Shopping for groceries No Help Needed   Driving No Help Needed   Climbing a flight of stairs No Help Needed   Getting to places beyond walking distances No Help Needed

## 2023-05-25 ENCOUNTER — TELEMEDICINE (OUTPATIENT)
Facility: CLINIC | Age: 58
End: 2023-05-25

## 2023-05-25 VITALS — DIASTOLIC BLOOD PRESSURE: 94 MMHG | SYSTOLIC BLOOD PRESSURE: 156 MMHG

## 2023-05-25 DIAGNOSIS — F41.9 ANXIETY: ICD-10-CM

## 2023-05-25 DIAGNOSIS — M25.532 LEFT WRIST PAIN: Primary | ICD-10-CM

## 2023-05-25 DIAGNOSIS — G62.9 NEUROPATHY: ICD-10-CM

## 2023-05-25 DIAGNOSIS — E11.40 TYPE 2 DIABETES MELLITUS WITH DIABETIC NEUROPATHY, WITHOUT LONG-TERM CURRENT USE OF INSULIN (HCC): ICD-10-CM

## 2023-05-25 DIAGNOSIS — K21.9 GASTROESOPHAGEAL REFLUX DISEASE, UNSPECIFIED WHETHER ESOPHAGITIS PRESENT: ICD-10-CM

## 2023-05-25 DIAGNOSIS — Z13.31 POSITIVE DEPRESSION SCREENING: ICD-10-CM

## 2023-05-25 DIAGNOSIS — G44.219 EPISODIC TENSION-TYPE HEADACHE, NOT INTRACTABLE: ICD-10-CM

## 2023-05-25 DIAGNOSIS — J44.9 CHRONIC OBSTRUCTIVE PULMONARY DISEASE, UNSPECIFIED COPD TYPE (HCC): ICD-10-CM

## 2023-05-25 PROCEDURE — 99214 OFFICE O/P EST MOD 30 MIN: CPT | Performed by: FAMILY MEDICINE

## 2023-05-25 RX ORDER — GABAPENTIN 800 MG/1
800 TABLET ORAL 3 TIMES DAILY
Qty: 90 TABLET | Refills: 1 | Status: SHIPPED | OUTPATIENT
Start: 2023-05-25 | End: 2023-07-24

## 2023-05-25 RX ORDER — OMEPRAZOLE 40 MG/1
40 CAPSULE, DELAYED RELEASE ORAL
Qty: 30 CAPSULE | Refills: 5 | Status: SHIPPED | OUTPATIENT
Start: 2023-05-25

## 2023-05-25 RX ORDER — BUTALBITAL, ACETAMINOPHEN AND CAFFEINE 50; 325; 40 MG/1; MG/1; MG/1
1 TABLET ORAL EVERY 6 HOURS PRN
Qty: 60 TABLET | Refills: 2 | Status: SHIPPED | OUTPATIENT
Start: 2023-05-25

## 2023-05-25 RX ORDER — QUETIAPINE FUMARATE 25 MG/1
TABLET, FILM COATED ORAL
Qty: 60 TABLET | Refills: 3 | Status: SHIPPED | OUTPATIENT
Start: 2023-05-25

## 2023-05-25 RX ORDER — ALBUTEROL SULFATE 90 UG/1
1 AEROSOL, METERED RESPIRATORY (INHALATION) EVERY 4 HOURS PRN
Qty: 18 G | Refills: 5 | Status: SHIPPED | OUTPATIENT
Start: 2023-05-25

## 2023-05-25 RX ORDER — PIOGLITAZONEHYDROCHLORIDE 30 MG/1
30 TABLET ORAL DAILY
Qty: 30 TABLET | Refills: 5 | Status: SHIPPED | OUTPATIENT
Start: 2023-05-25

## 2023-05-25 RX ORDER — TRAZODONE HYDROCHLORIDE 100 MG/1
100 TABLET ORAL NIGHTLY PRN
Qty: 30 TABLET | Refills: 5 | Status: SHIPPED | OUTPATIENT
Start: 2023-05-25 | End: 2023-05-25 | Stop reason: ALTCHOICE

## 2023-05-25 RX ORDER — FLUOXETINE 20 MG/1
60 TABLET ORAL DAILY
Qty: 90 TABLET | Refills: 5 | Status: SHIPPED | OUTPATIENT
Start: 2023-05-25

## 2023-05-25 SDOH — ECONOMIC STABILITY: FOOD INSECURITY: WITHIN THE PAST 12 MONTHS, YOU WORRIED THAT YOUR FOOD WOULD RUN OUT BEFORE YOU GOT MONEY TO BUY MORE.: NEVER TRUE

## 2023-05-25 SDOH — ECONOMIC STABILITY: FOOD INSECURITY: WITHIN THE PAST 12 MONTHS, THE FOOD YOU BOUGHT JUST DIDN'T LAST AND YOU DIDN'T HAVE MONEY TO GET MORE.: NEVER TRUE

## 2023-05-25 SDOH — ECONOMIC STABILITY: HOUSING INSECURITY
IN THE LAST 12 MONTHS, WAS THERE A TIME WHEN YOU DID NOT HAVE A STEADY PLACE TO SLEEP OR SLEPT IN A SHELTER (INCLUDING NOW)?: NO

## 2023-05-25 SDOH — ECONOMIC STABILITY: INCOME INSECURITY: HOW HARD IS IT FOR YOU TO PAY FOR THE VERY BASICS LIKE FOOD, HOUSING, MEDICAL CARE, AND HEATING?: NOT HARD AT ALL

## 2023-05-25 ASSESSMENT — PATIENT HEALTH QUESTIONNAIRE - PHQ9
3. TROUBLE FALLING OR STAYING ASLEEP: 2
SUM OF ALL RESPONSES TO PHQ QUESTIONS 1-9: 11
SUM OF ALL RESPONSES TO PHQ QUESTIONS 1-9: 11
2. FEELING DOWN, DEPRESSED OR HOPELESS: 2
9. THOUGHTS THAT YOU WOULD BE BETTER OFF DEAD, OR OF HURTING YOURSELF: 0
SUM OF ALL RESPONSES TO PHQ9 QUESTIONS 1 & 2: 4
8. MOVING OR SPEAKING SO SLOWLY THAT OTHER PEOPLE COULD HAVE NOTICED. OR THE OPPOSITE, BEING SO FIGETY OR RESTLESS THAT YOU HAVE BEEN MOVING AROUND A LOT MORE THAN USUAL: 0
5. POOR APPETITE OR OVEREATING: 2
1. LITTLE INTEREST OR PLEASURE IN DOING THINGS: 2
6. FEELING BAD ABOUT YOURSELF - OR THAT YOU ARE A FAILURE OR HAVE LET YOURSELF OR YOUR FAMILY DOWN: 0
4. FEELING TIRED OR HAVING LITTLE ENERGY: 3
SUM OF ALL RESPONSES TO PHQ QUESTIONS 1-9: 11
7. TROUBLE CONCENTRATING ON THINGS, SUCH AS READING THE NEWSPAPER OR WATCHING TELEVISION: 0
10. IF YOU CHECKED OFF ANY PROBLEMS, HOW DIFFICULT HAVE THESE PROBLEMS MADE IT FOR YOU TO DO YOUR WORK, TAKE CARE OF THINGS AT HOME, OR GET ALONG WITH OTHER PEOPLE: 2
SUM OF ALL RESPONSES TO PHQ QUESTIONS 1-9: 11

## 2023-05-25 ASSESSMENT — ANXIETY QUESTIONNAIRES
7. FEELING AFRAID AS IF SOMETHING AWFUL MIGHT HAPPEN: 3
5. BEING SO RESTLESS THAT IT IS HARD TO SIT STILL: 3
6. BECOMING EASILY ANNOYED OR IRRITABLE: 2
3. WORRYING TOO MUCH ABOUT DIFFERENT THINGS: 3
4. TROUBLE RELAXING: 3
GAD7 TOTAL SCORE: 20
2. NOT BEING ABLE TO STOP OR CONTROL WORRYING: 3
IF YOU CHECKED OFF ANY PROBLEMS ON THIS QUESTIONNAIRE, HOW DIFFICULT HAVE THESE PROBLEMS MADE IT FOR YOU TO DO YOUR WORK, TAKE CARE OF THINGS AT HOME, OR GET ALONG WITH OTHER PEOPLE: VERY DIFFICULT
1. FEELING NERVOUS, ANXIOUS, OR ON EDGE: 3

## 2023-05-25 NOTE — PROGRESS NOTES
PHQ-9 score today: (PHQ-9 Total Score: 11), additional evaluation and assessment performed, follow-up plan includes but not limited to: Medication management and Referral to /Specialist  for evaluation and management. Subjective:     Jen Sandhu is a 62 y.o. female who presents for follow up of   Patient Active Problem List   Diagnosis    Fatty infiltration of liver    S/P laparoscopic hysterectomy    Type 2 diabetes mellitus with diabetic neuropathy (HCC)    Caregiver burden    COPD (chronic obstructive pulmonary disease) (HCC)    Pain, chronic    Anxiety    Hyperlipidemia    Type 2 diabetes mellitus with hyperglycemia, without long-term current use of insulin ([address]       New concerns: falling apart  Wrist hurts  Lost Ins when  . Applying for disability  BG very hi, could not get mounjaro  Urinating a lot  Head hurts      Psychiatric ROS:   Reports taking psychiatric medications without side affects. No complaints of abnormal movements, suicidal ideation or focal weakness. Review of Systems, additional:  Pertinent items are noted in HPI.       Patient Active Problem List    Diagnosis Date Noted    Caregiver burden 2022    COPD (chronic obstructive pulmonary disease) ([address] 2019    Fatty infiltration of liver 2019    Pain, chronic 2019    Type 2 diabetes mellitus with hyperglycemia, without long-term current use of insulin ([address] 2018    Type 2 diabetes mellitus with diabetic neuropathy ([address] 2018    S/P laparoscopic hysterectomy 2014    Anxiety 2013    Hyperlipidemia 2013     No Active Allergies  Past Medical History:   Diagnosis Date    Back pain     Degenerative disc disease     Diabetes ([address]     ; glucose runs 200-300 at home    GERD (gastroesophageal reflux disease)     none recently, takes nexium prn only    Hypercholesteremia     Hypertension     Menopause     Psychiatric disorder     Anxiety; did not take xanax this am

## 2023-05-25 NOTE — PROGRESS NOTES
Chief Complaint   Patient presents with    Hypertension     Patient is aware that this is a Virtual Visit or Phone Call Only doctor's visit. Patient has not been out of the country in (14 months), NO diarrhea, NO cough, NO chest conjestion, NO temp. Pt has not been around anyone with these symptoms. Health Maintenance reviewed. I have reviewed the patient's medical history in detail and updated the computerized patient record. Have you been to the ER, urgent care clinic since your last visit? No Hospitalized since your last visit? No      2. Have you seen or consulted any other health care providers outside of the [address] [place] [address] since your last visit? No Include any pap smears or colon screening.      Encouraged pt to discuss pt's wishes with spouse/partner/family and bring them in the next appt to follow thru with the Advanced Directive

## 2023-06-29 PROBLEM — Z63.6 CAREGIVER BURDEN: Status: RESOLVED | Noted: 2022-02-07 | Resolved: 2023-06-29

## 2023-07-29 DIAGNOSIS — F41.9 ANXIETY: ICD-10-CM

## 2023-07-29 DIAGNOSIS — G62.9 NEUROPATHY: ICD-10-CM

## 2023-07-31 RX ORDER — GABAPENTIN 800 MG/1
TABLET ORAL
Qty: 90 TABLET | Refills: 0 | OUTPATIENT
Start: 2023-07-31

## 2023-08-03 ENCOUNTER — TELEMEDICINE (OUTPATIENT)
Facility: CLINIC | Age: 58
End: 2023-08-03

## 2023-08-03 DIAGNOSIS — G62.9 NEUROPATHY: ICD-10-CM

## 2023-08-03 DIAGNOSIS — E11.65 TYPE 2 DIABETES MELLITUS WITH HYPERGLYCEMIA, WITHOUT LONG-TERM CURRENT USE OF INSULIN (HCC): ICD-10-CM

## 2023-08-03 DIAGNOSIS — E78.2 MIXED HYPERLIPIDEMIA: ICD-10-CM

## 2023-08-03 DIAGNOSIS — L50.8 ACUTE URTICARIA: ICD-10-CM

## 2023-08-03 DIAGNOSIS — E11.40 TYPE 2 DIABETES MELLITUS WITH DIABETIC NEUROPATHY, WITHOUT LONG-TERM CURRENT USE OF INSULIN (HCC): Primary | ICD-10-CM

## 2023-08-03 DIAGNOSIS — F41.9 ANXIETY: ICD-10-CM

## 2023-08-03 DIAGNOSIS — J44.9 CHRONIC OBSTRUCTIVE PULMONARY DISEASE, UNSPECIFIED COPD TYPE (HCC): ICD-10-CM

## 2023-08-03 DIAGNOSIS — Z23 NEED FOR VACCINATION: ICD-10-CM

## 2023-08-03 PROCEDURE — 99213 OFFICE O/P EST LOW 20 MIN: CPT | Performed by: FAMILY MEDICINE

## 2023-08-03 RX ORDER — ZOSTER VACCINE RECOMBINANT, ADJUVANTED 50 MCG/0.5
0.5 KIT INTRAMUSCULAR SEE ADMIN INSTRUCTIONS
Qty: 0.5 ML | Refills: 0 | Status: SHIPPED | OUTPATIENT
Start: 2023-08-03 | End: 2023-08-04

## 2023-08-03 RX ORDER — CYCLOBENZAPRINE HCL 10 MG
10 TABLET ORAL 3 TIMES DAILY PRN
Qty: 90 TABLET | Refills: 5 | Status: SHIPPED | OUTPATIENT
Start: 2023-08-03

## 2023-08-03 RX ORDER — CLONIDINE HYDROCHLORIDE 0.1 MG/1
0.2 TABLET ORAL NIGHTLY
Qty: 60 TABLET | Refills: 5 | Status: SHIPPED | OUTPATIENT
Start: 2023-08-03

## 2023-08-03 RX ORDER — GABAPENTIN 800 MG/1
800 TABLET ORAL 3 TIMES DAILY
Qty: 90 TABLET | Refills: 1 | Status: SHIPPED | OUTPATIENT
Start: 2023-08-03 | End: 2023-10-02

## 2023-08-03 RX ORDER — DOXEPIN HYDROCHLORIDE 10 MG/1
10 CAPSULE ORAL 3 TIMES DAILY
Qty: 90 CAPSULE | Refills: 3 | Status: SHIPPED | OUTPATIENT
Start: 2023-08-03

## 2023-08-03 NOTE — PROGRESS NOTES
Chief Complaint   Patient presents with    Medication Refill     Patient is aware that this is a Virtual Visit or Phone Call Only doctor's visit. Patient has not been out of the country in (14 months), NO diarrhea, NO cough, NO chest conjestion, NO temp. Pt has not been around anyone with these symptoms. Health Maintenance reviewed. I have reviewed the patient's medical history in detail and updated the computerized patient record. Have you been to the ER, urgent care clinic since your last visit?  no Hospitalized since your last visit?  no    2. Have you seen or consulted any other health care providers outside of the [address] [place] [address] Avenue since your last visit? Include any pap smears or colon screening.      Encouraged pt to discuss pt's wishes with spouse/partner/family and bring them in the next appt to follow thru with the Advanced Directive
Daily as needed for swelling      lidocaine (LIDODERM) 5 % Place 1 patch onto the skin in the morning and 1 patch in the evening. No current facility-administered medications for this visit. No Known Allergies  Social History     Tobacco Use    Smoking status: Former     Packs/day: 1.00     Types: Cigarettes    Smokeless tobacco: Former     Quit date: 3/1/2022   Substance Use Topics    Alcohol use: No             Review of Systems  Pertinent items are noted in HPI. Objective:     Significant for the following: No acute distress    WD WN female NAD        Assessment/Plan:     Follow-up diabetes stable. Diabetic issues reviewed with her: all medications, side effects and compliance discussed carefully and glycohemoglobin and other lab monitoring discussed     Diagnosis Orders   1. Type 2 diabetes mellitus with diabetic neuropathy, without long-term current use of insulin ([address])        2. Chronic obstructive pulmonary disease, unspecified COPD type ([address])        3. Type 2 diabetes mellitus with hyperglycemia, without long-term current use of insulin ([address])        4. Anxiety  gabapentin (NEURONTIN) 800 MG tablet      5. Mixed hyperlipidemia        6. Need for vaccination  zoster recombinant adjuvanted vaccine [facility]) 50 MCG/0.5ML SUSR injection      7. Acute urticaria  doxepin (SINEQUAN) 10 MG capsule      8. Neuropathy  gabapentin (NEURONTIN) 800 MG tablet        Orders Placed This Encounter    zoster recombinant adjuvanted vaccine (SHINGRIX) 50 MCG/0.5ML SUSR injection     Sig: Inject 0.5 mLs into the muscle See Admin Instructions for 1 day     Dispense:  0.5 mL     Refill:  0    doxepin (SINEQUAN) 10 MG capsule     Sig: Take 1 capsule by mouth 3 times daily As needed     Dispense:  90 capsule     Refill:  3    gabapentin (NEURONTIN) 800 MG tablet     Sig: Take 1 tablet by mouth 3 times daily for 60 days.  Max Daily Amount: 2,400 mg     Dispense:  90 tablet     Refill:  1    cyclobenzaprine (FLEXERIL) 10

## 2023-08-08 ENCOUNTER — TELEPHONE (OUTPATIENT)
Facility: CLINIC | Age: 58
End: 2023-08-08

## 2023-08-08 NOTE — TELEPHONE ENCOUNTER
Pt calling in to get med for hives called in to [facility] tappk.  Pt can be reached at  [phone]

## 2023-08-09 DIAGNOSIS — L50.8 ACUTE URTICARIA: ICD-10-CM

## 2023-08-09 RX ORDER — DOXEPIN HYDROCHLORIDE 10 MG/1
10 CAPSULE ORAL 3 TIMES DAILY
Qty: 90 CAPSULE | Refills: 3 | Status: SHIPPED | OUTPATIENT
Start: 2023-08-09

## 2023-08-11 ENCOUNTER — TELEPHONE (OUTPATIENT)
Facility: CLINIC | Age: 58
End: 2023-08-11

## 2023-10-03 ENCOUNTER — TELEMEDICINE (OUTPATIENT)
Facility: CLINIC | Age: 58
End: 2023-10-03

## 2023-10-03 DIAGNOSIS — F41.9 ANXIETY: ICD-10-CM

## 2023-10-03 DIAGNOSIS — E78.2 MIXED HYPERLIPIDEMIA: ICD-10-CM

## 2023-10-03 DIAGNOSIS — R05.1 ACUTE COUGH: Primary | ICD-10-CM

## 2023-10-03 DIAGNOSIS — E11.40 TYPE 2 DIABETES MELLITUS WITH DIABETIC NEUROPATHY, WITHOUT LONG-TERM CURRENT USE OF INSULIN (HCC): ICD-10-CM

## 2023-10-03 DIAGNOSIS — E11.65 TYPE 2 DIABETES MELLITUS WITH HYPERGLYCEMIA, WITHOUT LONG-TERM CURRENT USE OF INSULIN (HCC): ICD-10-CM

## 2023-10-03 DIAGNOSIS — G44.219 EPISODIC TENSION-TYPE HEADACHE, NOT INTRACTABLE: ICD-10-CM

## 2023-10-03 DIAGNOSIS — Z13.31 POSITIVE DEPRESSION SCREENING: ICD-10-CM

## 2023-10-03 DIAGNOSIS — J44.1 CHRONIC OBSTRUCTIVE PULMONARY DISEASE WITH ACUTE EXACERBATION (HCC): ICD-10-CM

## 2023-10-03 DIAGNOSIS — G62.9 NEUROPATHY: ICD-10-CM

## 2023-10-03 PROCEDURE — 99214 OFFICE O/P EST MOD 30 MIN: CPT | Performed by: FAMILY MEDICINE

## 2023-10-03 RX ORDER — FLUOXETINE 20 MG/1
60 TABLET ORAL DAILY
Qty: 90 TABLET | Refills: 5 | Status: SHIPPED | OUTPATIENT
Start: 2023-10-03

## 2023-10-03 RX ORDER — BUTALBITAL, ACETAMINOPHEN AND CAFFEINE 50; 325; 40 MG/1; MG/1; MG/1
1 TABLET ORAL EVERY 6 HOURS PRN
Qty: 30 TABLET | Refills: 1 | Status: SHIPPED | OUTPATIENT
Start: 2023-10-03

## 2023-10-03 RX ORDER — GABAPENTIN 800 MG/1
800 TABLET ORAL 3 TIMES DAILY
Qty: 90 TABLET | Refills: 1 | Status: SHIPPED | OUTPATIENT
Start: 2023-10-03 | End: 2023-12-02

## 2023-10-03 RX ORDER — PIOGLITAZONEHYDROCHLORIDE 30 MG/1
30 TABLET ORAL DAILY
Qty: 30 TABLET | Refills: 5 | Status: SHIPPED | OUTPATIENT
Start: 2023-10-03

## 2023-10-03 RX ORDER — QUETIAPINE FUMARATE 25 MG/1
TABLET, FILM COATED ORAL
Qty: 60 TABLET | Refills: 3 | Status: SHIPPED | OUTPATIENT
Start: 2023-10-03

## 2023-10-03 RX ORDER — AZITHROMYCIN 250 MG/1
250 TABLET, FILM COATED ORAL SEE ADMIN INSTRUCTIONS
Qty: 6 TABLET | Refills: 0 | Status: SHIPPED | OUTPATIENT
Start: 2023-10-03 | End: 2023-10-08

## 2023-10-03 ASSESSMENT — PATIENT HEALTH QUESTIONNAIRE - PHQ9
SUM OF ALL RESPONSES TO PHQ9 QUESTIONS 1 & 2: 2
SUM OF ALL RESPONSES TO PHQ QUESTIONS 1-9: 2
SUM OF ALL RESPONSES TO PHQ QUESTIONS 1-9: 2
1. LITTLE INTEREST OR PLEASURE IN DOING THINGS: 1
SUM OF ALL RESPONSES TO PHQ QUESTIONS 1-9: 2
SUM OF ALL RESPONSES TO PHQ QUESTIONS 1-9: 2
2. FEELING DOWN, DEPRESSED OR HOPELESS: 1

## 2023-10-03 NOTE — PROGRESS NOTES
FLUoxetine HCl, PMDD, 20 MG TABS      7. Neuropathy  gabapentin (NEURONTIN) 800 MG tablet      8. Positive depression screening  QUEtiapine (SEROQUEL) 25 MG tablet    FLUoxetine HCl, PMDD, 20 MG TABS      9. Episodic tension-type headache, not intractable  butalbital-acetaminophen-caffeine (FIORICET, ESGIC) -40 MG per tablet        Orders Placed This Encounter    Basic Metabolic Panel     Standing Status:   Future     Standing Expiration Date:   10/3/2024    Lipid Panel     Standing Status:   Future     Standing Expiration Date:   10/3/2024    Hemoglobin A1C     Standing Status:   Future     Standing Expiration Date:   10/3/2024    azithromycin (ZITHROMAX) 250 MG tablet     Sig: Take 1 tablet by mouth See Admin Instructions for 5 days 500mg on day 1 followed by 250mg on days 2 - 5     Dispense:  6 tablet     Refill:  0    gabapentin (NEURONTIN) 800 MG tablet     Sig: Take 1 tablet by mouth 3 times daily for 60 days. Max Daily Amount: 2,400 mg     Dispense:  90 tablet     Refill:  1    QUEtiapine (SEROQUEL) 25 MG tablet     Si to 2 at night     Dispense:  60 tablet     Refill:  3    pioglitazone (ACTOS) 30 MG tablet     Sig: Take 1 tablet by mouth daily     Dispense:  30 tablet     Refill:  5    metFORMIN (GLUCOPHAGE) 500 MG tablet     Sig: Take 2 tablets by mouth 2 times daily (with meals)     Dispense:  120 tablet     Refill:  5    FLUoxetine HCl, PMDD, 20 MG TABS     Sig: Take 60 mg by mouth daily     Dispense:  90 tablet     Refill:  5    butalbital-acetaminophen-caffeine (FIORICET, ESGIC) -40 MG per tablet     Sig: Take 1 tablet by mouth every 6 hours as needed for Headaches     Dispense:  30 tablet     Refill:  1     Discussed possible side affects, precautions, and drug interactions and possible benefits of the medication(s).     Current Outpatient Medications   Medication Sig Dispense Refill    azithromycin (ZITHROMAX) 250 MG tablet Take 1 tablet by mouth See Admin Instructions for 5 days

## 2023-12-05 ENCOUNTER — TELEMEDICINE (OUTPATIENT)
Facility: CLINIC | Age: 58
End: 2023-12-05

## 2023-12-05 DIAGNOSIS — G56.02 CARPAL TUNNEL SYNDROME OF LEFT WRIST: Primary | ICD-10-CM

## 2023-12-05 DIAGNOSIS — K21.9 GASTROESOPHAGEAL REFLUX DISEASE, UNSPECIFIED WHETHER ESOPHAGITIS PRESENT: ICD-10-CM

## 2023-12-05 DIAGNOSIS — G62.9 NEUROPATHY: ICD-10-CM

## 2023-12-05 DIAGNOSIS — E11.40 TYPE 2 DIABETES MELLITUS WITH DIABETIC NEUROPATHY, WITHOUT LONG-TERM CURRENT USE OF INSULIN (HCC): ICD-10-CM

## 2023-12-05 DIAGNOSIS — Z13.31 POSITIVE DEPRESSION SCREENING: ICD-10-CM

## 2023-12-05 DIAGNOSIS — F41.9 ANXIETY: ICD-10-CM

## 2023-12-05 DIAGNOSIS — J44.9 CHRONIC OBSTRUCTIVE PULMONARY DISEASE, UNSPECIFIED COPD TYPE (HCC): ICD-10-CM

## 2023-12-05 PROCEDURE — 99214 OFFICE O/P EST MOD 30 MIN: CPT | Performed by: FAMILY MEDICINE

## 2023-12-05 RX ORDER — CYCLOBENZAPRINE HCL 10 MG
10 TABLET ORAL 3 TIMES DAILY PRN
Qty: 90 TABLET | Refills: 5 | Status: SHIPPED | OUTPATIENT
Start: 2023-12-05

## 2023-12-05 RX ORDER — ALBUTEROL SULFATE 90 UG/1
1 AEROSOL, METERED RESPIRATORY (INHALATION) EVERY 6 HOURS PRN
Qty: 18 G | Refills: 5 | Status: SHIPPED | OUTPATIENT
Start: 2023-12-05

## 2023-12-05 RX ORDER — PIOGLITAZONEHYDROCHLORIDE 30 MG/1
30 TABLET ORAL DAILY
Qty: 30 TABLET | Refills: 5 | Status: SHIPPED | OUTPATIENT
Start: 2023-12-05

## 2023-12-05 RX ORDER — GABAPENTIN 800 MG/1
800 TABLET ORAL 3 TIMES DAILY
Qty: 90 TABLET | Refills: 1 | Status: SHIPPED | OUTPATIENT
Start: 2023-12-05 | End: 2024-02-03

## 2023-12-05 RX ORDER — OMEPRAZOLE 40 MG/1
40 CAPSULE, DELAYED RELEASE ORAL
Qty: 30 CAPSULE | Refills: 5 | Status: SHIPPED | OUTPATIENT
Start: 2023-12-05

## 2023-12-05 RX ORDER — BUPROPION HYDROCHLORIDE 100 MG/1
100 TABLET, EXTENDED RELEASE ORAL 2 TIMES DAILY
Qty: 60 TABLET | Refills: 3 | Status: SHIPPED | OUTPATIENT
Start: 2023-12-05

## 2023-12-05 RX ORDER — CLONIDINE HYDROCHLORIDE 0.1 MG/1
0.2 TABLET ORAL NIGHTLY
Qty: 60 TABLET | Refills: 5 | Status: SHIPPED | OUTPATIENT
Start: 2023-12-05

## 2023-12-05 RX ORDER — QUETIAPINE FUMARATE 25 MG/1
TABLET, FILM COATED ORAL
Qty: 60 TABLET | Refills: 5 | Status: SHIPPED | OUTPATIENT
Start: 2023-12-05

## 2023-12-05 NOTE — PROGRESS NOTES
Chief Complaint   Patient presents with    Medication Refill     Patient is aware that this is a Virtual Visit or Phone Call Only doctor's visit. Patient has not been out of the country in (14 months), NO diarrhea, NO cough, NO chest conjestion, NO temp. Pt has not been around anyone with these symptoms. Health Maintenance reviewed. I have reviewed the patient's medical history in detail and updated the computerized patient record. Have you been to the ER, urgent care clinic since your last visit? No Hospitalized since your last visit?  no    2. Have you seen or consulted any other health care providers outside of the [address] [place] [address] Avenue since your last visit? Include any pap smears or colon screening.      Encouraged pt to discuss pt's wishes with spouse/partner/family and bring them in the next appt to follow thru with the Advanced Directive
MG delayed release capsule      6. Type 2 diabetes mellitus with diabetic neuropathy, without long-term current use of insulin (HCC)  pioglitazone (ACTOS) 30 MG tablet    Basic Metabolic Panel    Hemoglobin A1C    metFORMIN (GLUCOPHAGE) 500 MG tablet      7. Positive depression screening  QUEtiapine (SEROQUEL) 25 MG tablet      8. BMI 27.0-27.9,adult          Orders Placed This Encounter    Basic Metabolic Panel     Standing Status:   Future     Standing Expiration Date:   2024    Hemoglobin A1C     Standing Status:   Future     Standing Expiration Date:   2024    Ambulatory referral to Orthopedic Surgery     Referral Priority:   Routine     Referral Type:   Eval and Treat     Referral Reason:   Specialty Services Required     Referred to Provider:   Kristine Mitchell MD     Requested Specialty:   Hand Surgery     Number of Visits Requested:   1    gabapentin (NEURONTIN) 800 MG tablet     Sig: Take 1 tablet by mouth 3 times daily for 60 days.  Max Daily Amount: 2,400 mg     Dispense:  90 tablet     Refill:  1    albuterol sulfate HFA (PROVENTIL;VENTOLIN;PROAIR) 108 (90 Base) MCG/ACT inhaler     Sig: Inhale 1 puff into the lungs every 6 hours as needed for Wheezing     Dispense:  18 g     Refill:  5    cloNIDine (CATAPRES) 0.1 MG tablet     Sig: Take 2 tablets by mouth nightly     Dispense:  60 tablet     Refill:  5    cyclobenzaprine (FLEXERIL) 10 MG tablet     Sig: Take 1 tablet by mouth 3 times daily as needed for Muscle spasms     Dispense:  90 tablet     Refill:  5    omeprazole (PRILOSEC) 40 MG delayed release capsule     Sig: Take 1 capsule by mouth every morning (before breakfast)     Dispense:  30 capsule     Refill:  5    pioglitazone (ACTOS) 30 MG tablet     Sig: Take 1 tablet by mouth daily     Dispense:  30 tablet     Refill:  5    QUEtiapine (SEROQUEL) 25 MG tablet     Si to 2 at night     Dispense:  60 tablet     Refill:  5    buPROPion (WELLBUTRIN SR) 100 MG extended release tablet     Sig:

## 2024-01-07 DIAGNOSIS — K21.9 GASTROESOPHAGEAL REFLUX DISEASE, UNSPECIFIED WHETHER ESOPHAGITIS PRESENT: ICD-10-CM

## 2024-01-11 RX ORDER — OMEPRAZOLE 40 MG/1
CAPSULE, DELAYED RELEASE ORAL
Qty: 30 CAPSULE | Refills: 5 | Status: SHIPPED | OUTPATIENT
Start: 2024-01-11

## 2024-02-07 ENCOUNTER — TELEPHONE (OUTPATIENT)
Facility: CLINIC | Age: 59
End: 2024-02-07

## 2024-02-07 NOTE — TELEPHONE ENCOUNTER
----- Message from April Nagle sent at 2/7/2024  3:16 PM EST -----  Subject: Refill Request    QUESTIONS  Name of Medication? gabapentin (NEURONTIN) 800 MG tablet  Patient-reported dosage and instructions? Take 1 tablet by mouth 3 times   daily for 60 days. Max Daily Amount? 2,400 mg  How many days do you have left? 0  Preferred Pharmacy? [facility] [employer]  Pharmacy phone number (if available)? [phone]  ---------------------------------------------------------------------------  --------------,  Name of Medication? pioglitazone (ACTOS) 30 MG tablet  Patient-reported dosage and instructions? Take 1 tablet by mouth daily  How many days do you have left? 0  Preferred Pharmacy? [facility]  Pharmacy phone number (if available)? [phone]  ---------------------------------------------------------------------------  --------------,  Name of Medication? metFORMIN (GLUCOPHAGE) 500 MG tablet  Patient-reported dosage and instructions? Take 2 tablets by mouth 2 times   daily (with meals)  How many days do you have left? 0  Preferred Pharmacy? [facility]  Pharmacy phone number (if available)? [phone]  ---------------------------------------------------------------------------  --------------,  Name of Medication? omeprazole (PRILOSEC) 40 MG delayed release capsule  Patient-reported dosage and instructions? TAKE 1 CAPSULE BY MOUTH ONCE   DAILY IN THE MORNING BEFORE BREAKFAST  How many days do you have left? 0  Preferred Pharmacy? [facility]  Pharmacy phone number (if available)? [phone]  ---------------------------------------------------------------------------  --------------,  Name of Medication? cyclobenzaprine (FLEXERIL) 10 MG tablet  Patient-reported dosage and instructions? Take 1 tablet by mouth 3 times   daily as needed for Muscle spasms  How many days do you have left? 0  Preferred Pharmacy? [facility]  Pharmacy phone number (if available)?

## 2024-02-08 DIAGNOSIS — K21.9 GASTROESOPHAGEAL REFLUX DISEASE, UNSPECIFIED WHETHER ESOPHAGITIS PRESENT: ICD-10-CM

## 2024-02-08 DIAGNOSIS — F41.9 ANXIETY: ICD-10-CM

## 2024-02-08 DIAGNOSIS — G62.9 NEUROPATHY: ICD-10-CM

## 2024-02-08 RX ORDER — OMEPRAZOLE 40 MG/1
40 CAPSULE, DELAYED RELEASE ORAL DAILY
Qty: 30 CAPSULE | Refills: 1 | Status: SHIPPED | OUTPATIENT
Start: 2024-02-08

## 2024-02-08 RX ORDER — GABAPENTIN 800 MG/1
800 TABLET ORAL 3 TIMES DAILY
Qty: 90 TABLET | Refills: 0 | Status: SHIPPED | OUTPATIENT
Start: 2024-02-08 | End: 2024-03-09

## 2024-03-06 ENCOUNTER — OFFICE VISIT (OUTPATIENT)
Facility: CLINIC | Age: 59
End: 2024-03-06

## 2024-03-06 VITALS
DIASTOLIC BLOOD PRESSURE: 77 MMHG | HEIGHT: 65 IN | SYSTOLIC BLOOD PRESSURE: 126 MMHG | HEART RATE: 102 BPM | OXYGEN SATURATION: 95 % | RESPIRATION RATE: 16 BRPM | TEMPERATURE: 98.7 F | BODY MASS INDEX: 27.16 KG/M2 | WEIGHT: 163 LBS

## 2024-03-06 DIAGNOSIS — Z12.11 SCREEN FOR COLON CANCER: ICD-10-CM

## 2024-03-06 DIAGNOSIS — G62.9 NEUROPATHY: ICD-10-CM

## 2024-03-06 DIAGNOSIS — E78.2 MIXED HYPERLIPIDEMIA: ICD-10-CM

## 2024-03-06 DIAGNOSIS — Z13.31 POSITIVE DEPRESSION SCREENING: ICD-10-CM

## 2024-03-06 DIAGNOSIS — G56.02 CARPAL TUNNEL SYNDROME OF LEFT WRIST: ICD-10-CM

## 2024-03-06 DIAGNOSIS — E11.40 TYPE 2 DIABETES MELLITUS WITH DIABETIC NEUROPATHY, WITHOUT LONG-TERM CURRENT USE OF INSULIN (HCC): Primary | ICD-10-CM

## 2024-03-06 DIAGNOSIS — F41.9 ANXIETY: ICD-10-CM

## 2024-03-06 DIAGNOSIS — K21.9 GASTROESOPHAGEAL REFLUX DISEASE, UNSPECIFIED WHETHER ESOPHAGITIS PRESENT: ICD-10-CM

## 2024-03-06 RX ORDER — QUETIAPINE FUMARATE 25 MG/1
TABLET, FILM COATED ORAL
Qty: 60 TABLET | Refills: 5 | Status: SHIPPED | OUTPATIENT
Start: 2024-03-06

## 2024-03-06 RX ORDER — GABAPENTIN 800 MG/1
800 TABLET ORAL 3 TIMES DAILY
Qty: 90 TABLET | Refills: 2 | Status: SHIPPED | OUTPATIENT
Start: 2024-03-06 | End: 2024-06-04

## 2024-03-06 RX ORDER — CYCLOBENZAPRINE HCL 10 MG
10 TABLET ORAL 3 TIMES DAILY PRN
Qty: 90 TABLET | Refills: 5 | Status: SHIPPED | OUTPATIENT
Start: 2024-03-06

## 2024-03-06 RX ORDER — BUPROPION HYDROCHLORIDE 100 MG/1
100 TABLET, EXTENDED RELEASE ORAL 2 TIMES DAILY
Qty: 60 TABLET | Refills: 5 | Status: SHIPPED | OUTPATIENT
Start: 2024-03-06

## 2024-03-06 RX ORDER — OMEPRAZOLE 40 MG/1
40 CAPSULE, DELAYED RELEASE ORAL DAILY
Qty: 30 CAPSULE | Refills: 5 | Status: SHIPPED | OUTPATIENT
Start: 2024-03-06

## 2024-03-06 RX ORDER — CLONIDINE HYDROCHLORIDE 0.1 MG/1
0.2 TABLET ORAL NIGHTLY
Qty: 60 TABLET | Refills: 5 | Status: SHIPPED | OUTPATIENT
Start: 2024-03-06

## 2024-03-06 RX ORDER — PIOGLITAZONEHYDROCHLORIDE 30 MG/1
30 TABLET ORAL DAILY
Qty: 30 TABLET | Refills: 5 | Status: SHIPPED | OUTPATIENT
Start: 2024-03-06

## 2024-03-06 RX ORDER — FLUOXETINE 20 MG/1
60 TABLET ORAL DAILY
Qty: 90 TABLET | Refills: 5 | Status: SHIPPED | OUTPATIENT
Start: 2024-03-06

## 2024-03-06 NOTE — PROGRESS NOTES
Subjective:     Domenico Max is a 59 y.o. female who presents for follow up of   Patient Active Problem List   Diagnosis    Fatty infiltration of liver    S/P laparoscopic hysterectomy    Type 2 diabetes mellitus with diabetic neuropathy (HCC)    COPD (chronic obstructive pulmonary disease) ([facility])    Pain, chronic    Anxiety    Hyperlipidemia    Type 2 diabetes mellitus with hyperglycemia, without long-term current use of insulin ([facility])     Hemoglobin A1C   Date Value Ref Range Status   12/22/2020 7.3 (H) 4.8 - 5.6 % Final     Comment:              Prediabetes: 5.7 - 6.4           Diabetes: >6.4           Glycemic control for adults with diabetes: <7.0         New concerns: got her disability, now going apply for Medicaid, no labs.  Notes weight gain asks about diabetes weight loss medicines.  Left wrist pain x for almost a year, no Ins. so has not been to see orthopedics.  Lots of numbness and tingling in the hand but also wrist pain.  Wears a brace.  Symptoms are worsening.  Moods been okay.      Psychiatric ROS:   Reports taking psychiatric medications without side affects. No complaints of abnormal movements, suicidal ideation or focal weakness.     Review of Systems, additional:  Pertinent items are noted in HPI.      Patient Active Problem List    Diagnosis Date Noted    COPD (chronic obstructive pulmonary disease) ([facility]) 11/04/2019    Fatty infiltration of liver 07/29/2019    Pain, chronic 04/22/2019    Type 2 diabetes mellitus with hyperglycemia, without long-term current use of insulin ([facility]) 05/08/2018    Type 2 diabetes mellitus with diabetic neuropathy ([facility]) 03/14/2018    S/P laparoscopic hysterectomy 06/24/2014    Anxiety 02/04/2013    Hyperlipidemia 02/04/2013     Current Outpatient Medications   Medication Sig Dispense Refill    buPROPion (WELLBUTRIN SR) 100 MG extended release tablet Take 1 tablet by mouth 2 times daily 60 tablet 5    cloNIDine (CATAPRES) 0.1 MG tablet Take 2 tablets by mouth

## 2024-03-06 NOTE — PROGRESS NOTES
Chief Complaint   Patient presents with    Medication Refill     Patient has not been out of the country in (14 months), NO diarrhea, NO cough, NO chest conjestion, NO temp.  Pt has not been around anyone with these symptoms.     Health Maintenance reviewed.    I have reviewed the patient's medical history in detail and updated the computerized patient record.    \"Have you been to the ER, urgent care clinic since your last visit?  Hospitalized since your last visit?\"    no    “Have you seen or consulted any other health care providers outside of [facility] since your last visit?”    no    “Have you had a colorectal cancer screening such as a colonoscopy/FIT/Cologuard?    no     Have you had a mammogram?”   no

## 2024-05-09 DIAGNOSIS — F41.9 ANXIETY: ICD-10-CM

## 2024-05-09 DIAGNOSIS — Z13.31 POSITIVE DEPRESSION SCREENING: ICD-10-CM

## 2024-05-10 NOTE — TELEPHONE ENCOUNTER
Requested Prescriptions     Pending Prescriptions Disp Refills    QUEtiapine (SEROQUEL) 25 MG tablet [Pharmacy Med Name: QUEtiapine Fumarate 25 MG Oral Tablet] 60 tablet 0     Sig: TAKE 1 TO 2 TABLETS BY MOUTH ONCE DAILY AT BEDTIME     Last fill 3/6/2024 for #60 a/ 5 addtnl  Last OV 3/6/2024  No appts scheduled at this time    Kesha Jacques LPN

## 2024-05-12 RX ORDER — QUETIAPINE FUMARATE 25 MG/1
TABLET, FILM COATED ORAL
Qty: 60 TABLET | Refills: 2 | Status: SHIPPED | OUTPATIENT
Start: 2024-05-12

## 2024-06-11 ENCOUNTER — TELEMEDICINE (OUTPATIENT)
Facility: CLINIC | Age: 59
End: 2024-06-11

## 2024-06-11 DIAGNOSIS — G62.9 NEUROPATHY: ICD-10-CM

## 2024-06-11 DIAGNOSIS — G44.219 EPISODIC TENSION-TYPE HEADACHE, NOT INTRACTABLE: ICD-10-CM

## 2024-06-11 DIAGNOSIS — F41.9 ANXIETY: ICD-10-CM

## 2024-06-11 DIAGNOSIS — E11.40 TYPE 2 DIABETES MELLITUS WITH DIABETIC NEUROPATHY, WITHOUT LONG-TERM CURRENT USE OF INSULIN (HCC): ICD-10-CM

## 2024-06-11 DIAGNOSIS — Z13.31 POSITIVE DEPRESSION SCREENING: ICD-10-CM

## 2024-06-11 DIAGNOSIS — K21.9 GASTROESOPHAGEAL REFLUX DISEASE, UNSPECIFIED WHETHER ESOPHAGITIS PRESENT: ICD-10-CM

## 2024-06-11 PROCEDURE — 99214 OFFICE O/P EST MOD 30 MIN: CPT | Performed by: FAMILY MEDICINE

## 2024-06-11 RX ORDER — OMEPRAZOLE 40 MG/1
40 CAPSULE, DELAYED RELEASE ORAL DAILY
Qty: 30 CAPSULE | Refills: 5 | Status: CANCELLED | OUTPATIENT
Start: 2024-06-11

## 2024-06-11 RX ORDER — BUPROPION HYDROCHLORIDE 100 MG/1
100 TABLET, EXTENDED RELEASE ORAL 2 TIMES DAILY
Qty: 180 TABLET | Refills: 1 | Status: SHIPPED | OUTPATIENT
Start: 2024-06-11

## 2024-06-11 RX ORDER — TRAZODONE HYDROCHLORIDE 50 MG/1
TABLET ORAL
Qty: 90 TABLET | Refills: 1 | Status: SHIPPED | OUTPATIENT
Start: 2024-06-11

## 2024-06-11 RX ORDER — BUTALBITAL, ACETAMINOPHEN AND CAFFEINE 50; 325; 40 MG/1; MG/1; MG/1
1 TABLET ORAL EVERY 6 HOURS PRN
Qty: 30 TABLET | Refills: 1 | Status: SHIPPED | OUTPATIENT
Start: 2024-06-11

## 2024-06-11 RX ORDER — GABAPENTIN 800 MG/1
800 TABLET ORAL 3 TIMES DAILY
Qty: 90 TABLET | Refills: 2 | Status: SHIPPED | OUTPATIENT
Start: 2024-06-11 | End: 2024-09-09

## 2024-06-11 RX ORDER — FLUOXETINE 20 MG/1
60 TABLET ORAL DAILY
Qty: 270 TABLET | Refills: 1 | Status: SHIPPED | OUTPATIENT
Start: 2024-06-11

## 2024-06-11 SDOH — ECONOMIC STABILITY: FOOD INSECURITY: WITHIN THE PAST 12 MONTHS, YOU WORRIED THAT YOUR FOOD WOULD RUN OUT BEFORE YOU GOT MONEY TO BUY MORE.: NEVER TRUE

## 2024-06-11 SDOH — ECONOMIC STABILITY: FOOD INSECURITY: WITHIN THE PAST 12 MONTHS, THE FOOD YOU BOUGHT JUST DIDN'T LAST AND YOU DIDN'T HAVE MONEY TO GET MORE.: NEVER TRUE

## 2024-06-11 SDOH — ECONOMIC STABILITY: INCOME INSECURITY: HOW HARD IS IT FOR YOU TO PAY FOR THE VERY BASICS LIKE FOOD, HOUSING, MEDICAL CARE, AND HEATING?: NOT HARD AT ALL

## 2024-06-11 NOTE — PROGRESS NOTES
Chief Complaint   Patient presents with    Medication Refill         Health Maintenance Due   Topic Date Due    Hepatitis B vaccine (1 of 3 - 3-dose series) Never done    HIV screen  Never done    Colorectal Cancer Screen  Never done    Shingles vaccine (1 of 2) Never done    Breast cancer screen  04/18/2020    Diabetic retinal exam  05/21/2020    Diabetic Alb to Cr ratio (uACR) test  07/29/2020    Lipids  12/22/2021    GFR test (Diabetes, CKD 3-4, OR last GFR 15-59)  12/22/2021    Diabetic foot exam  02/07/2023    COVID-19 Vaccine (3 - 2023-24 season) 09/01/2023    A1C test (Diabetic or Prediabetic)  02/28/2024         \"Have you been to the ER, urgent care clinic since your last visit?  Hospitalized since your last visit?\"    NO    “Have you seen or consulted any other health care providers outside of [facility] since your last visit?”    NO    “Have you had a colorectal cancer screening such as a colonoscopy/FIT/Cologuard?    NO    No colonoscopy on file  No cologuard on file  No FIT/FOBT on file   No flexible sigmoidoscopy on file        Have you had a mammogram?”   NO    Date of last Mammogram: 4/18/2018         
Food Insecurity (6/11/2024)    Hunger Vital Sign     Worried About Running Out of Food in the Last Year: Never true     Ran Out of Food in the Last Year: Never true   Transportation Needs: Unknown (6/11/2024)    PRAPARE - Transportation     Lack of Transportation (Medical): Not on file     Lack of Transportation (Non-Medical): No   Physical Activity: Not on file   Stress: Not on file   Social Connections: Not on file   Intimate Partner Violence: Not on file   Housing Stability: Unknown (6/11/2024)    Housing Stability Vital Sign     Unable to Pay for Housing in the Last Year: Not on file     Number of Places Lived in the Last Year: Not on file     Unstable Housing in the Last Year: No         Objective:     There were no vitals taken for this visit.    Physical Exam        The patient (or guardian, if applicable) and other individuals in attendance with the patient were advised that Artificial Intelligence will be utilized during this visit to record and process the conversation to generate a clinical note. The patient (or guardian, if applicable) and other individuals in attendance at the appointment consented to the use of AI, including the recording.      An electronic signature was used to authenticate this note.    --MD Domenico Beckham, was evaluated through a synchronous (real-time) audio-video encounter. The patient (or guardian if applicable) is aware that this is a billable service, which includes applicable co-pays. This Virtual Visit was conducted with patient's (and/or legal guardian's) consent. Patient identification was verified, and a caregiver was present when appropriate.   The patient was located at Home: [address] [address]  Provider was located at Facility (Appt Dept): [address] [place] [address] [address]  [place] [address]  Confirm you are appropriately licensed, registered, or certified to deliver care in the state where the patient is located as

## 2024-06-11 NOTE — PATIENT INSTRUCTIONS
Newer diabetes medication have good cardiovascular profiles and reduce heart attacks and strokes.  They however can be very expensive and have their own set of side effects.    SGLT2 Inhibitors: All oral, small amounts of weight loss.  Jardiance, Farxiga, Invokana, Qtern, Stefanoglatro    GLP agonist: Injectable, have weight loss properties much greater weight loss.    Victoza, Ozempic, Byetta, Tanzeum, Mounjaro    GLP-1 agonists: Oral  Rybelsus

## 2024-07-01 ENCOUNTER — NURSE ONLY (OUTPATIENT)
Facility: CLINIC | Age: 59
End: 2024-07-01

## 2024-07-01 VITALS — TEMPERATURE: 97.8 F

## 2024-07-01 DIAGNOSIS — E11.65 TYPE 2 DIABETES MELLITUS WITH HYPERGLYCEMIA, WITHOUT LONG-TERM CURRENT USE OF INSULIN (HCC): Primary | ICD-10-CM

## 2024-07-01 LAB — HBA1C MFR BLD: ABNORMAL %

## 2024-07-01 PROCEDURE — 83036 HEMOGLOBIN GLYCOSYLATED A1C: CPT | Performed by: FAMILY MEDICINE

## 2024-07-01 NOTE — PROGRESS NOTES
Chief Complaint   Patient presents with    A1C     As per VORB from Dr. Schmitt, A1C was done.     A1C - 11.0

## 2024-07-16 ENCOUNTER — TELEPHONE (OUTPATIENT)
Facility: CLINIC | Age: 59
End: 2024-07-16

## 2024-07-16 DIAGNOSIS — E11.40 TYPE 2 DIABETES MELLITUS WITH DIABETIC NEUROPATHY, WITHOUT LONG-TERM CURRENT USE OF INSULIN (HCC): Primary | ICD-10-CM

## 2024-07-16 RX ORDER — GLIPIZIDE 5 MG/1
5 TABLET ORAL 2 TIMES DAILY
Qty: 180 TABLET | Refills: 1 | Status: SHIPPED | OUTPATIENT
Start: 2024-07-16

## 2024-07-16 NOTE — TELEPHONE ENCOUNTER
Discussed her elevated hemoglobin A1c, restart glipizide 5 mg twice a day.  Discussed compliance.  Discussed possible side affects, precautions, and drug interactions and possible benefits of the medication(s).  Actos makes her gain weight does not help.  Follow-up few months

## 2024-08-09 DIAGNOSIS — Z13.31 POSITIVE DEPRESSION SCREENING: ICD-10-CM

## 2024-08-13 RX ORDER — TRAZODONE HYDROCHLORIDE 50 MG/1
TABLET, FILM COATED ORAL
Qty: 90 TABLET | Refills: 0 | OUTPATIENT
Start: 2024-08-13

## 2024-08-15 ENCOUNTER — TELEMEDICINE (OUTPATIENT)
Facility: CLINIC | Age: 59
End: 2024-08-15

## 2024-08-15 DIAGNOSIS — F32.1 EPISODE OF MODERATE MAJOR DEPRESSION (HCC): ICD-10-CM

## 2024-08-15 DIAGNOSIS — E11.40 TYPE 2 DIABETES MELLITUS WITH DIABETIC NEUROPATHY, WITHOUT LONG-TERM CURRENT USE OF INSULIN (HCC): Primary | ICD-10-CM

## 2024-08-15 DIAGNOSIS — Z72.0 TOBACCO ABUSE: ICD-10-CM

## 2024-08-15 DIAGNOSIS — G62.9 NEUROPATHY: ICD-10-CM

## 2024-08-15 DIAGNOSIS — E11.65 TYPE 2 DIABETES MELLITUS WITH HYPERGLYCEMIA, WITHOUT LONG-TERM CURRENT USE OF INSULIN (HCC): ICD-10-CM

## 2024-08-15 DIAGNOSIS — F41.9 ANXIETY: ICD-10-CM

## 2024-08-15 DIAGNOSIS — K21.9 GASTROESOPHAGEAL REFLUX DISEASE, UNSPECIFIED WHETHER ESOPHAGITIS PRESENT: ICD-10-CM

## 2024-08-15 PROCEDURE — 99214 OFFICE O/P EST MOD 30 MIN: CPT | Performed by: FAMILY MEDICINE

## 2024-08-15 RX ORDER — GABAPENTIN 800 MG/1
800 TABLET ORAL 3 TIMES DAILY
Qty: 90 TABLET | Refills: 1 | Status: SHIPPED | OUTPATIENT
Start: 2024-08-15 | End: 2024-11-13

## 2024-08-15 RX ORDER — OMEPRAZOLE 40 MG/1
40 CAPSULE, DELAYED RELEASE ORAL DAILY
Qty: 90 CAPSULE | Refills: 2 | Status: SHIPPED | OUTPATIENT
Start: 2024-08-15

## 2024-08-15 RX ORDER — BUPROPION HYDROCHLORIDE 100 MG/1
TABLET, EXTENDED RELEASE ORAL
Qty: 360 TABLET | Refills: 1 | Status: SHIPPED | OUTPATIENT
Start: 2024-08-15

## 2024-08-15 RX ORDER — CYCLOBENZAPRINE HCL 10 MG
10 TABLET ORAL 3 TIMES DAILY PRN
Qty: 270 TABLET | Refills: 1 | Status: SHIPPED | OUTPATIENT
Start: 2024-08-15

## 2024-08-15 RX ORDER — CLONIDINE HYDROCHLORIDE 0.1 MG/1
0.1 TABLET ORAL NIGHTLY
Qty: 100 TABLET | Refills: 1 | Status: SHIPPED | OUTPATIENT
Start: 2024-08-15

## 2024-08-15 ASSESSMENT — PATIENT HEALTH QUESTIONNAIRE - PHQ9
2. FEELING DOWN, DEPRESSED OR HOPELESS: SEVERAL DAYS
1. LITTLE INTEREST OR PLEASURE IN DOING THINGS: SEVERAL DAYS
SUM OF ALL RESPONSES TO PHQ QUESTIONS 1-9: 2
SUM OF ALL RESPONSES TO PHQ9 QUESTIONS 1 & 2: 2
SUM OF ALL RESPONSES TO PHQ QUESTIONS 1-9: 2

## 2024-08-15 NOTE — PROGRESS NOTES
Chief Complaint   Patient presents with    Medication Refill     Patient has not been out of the country in (14 months), NO diarrhea, NO cough, NO chest conjestion, NO temp.  Pt has not been around anyone with these symptoms.     Health Maintenance reviewed.    I have reviewed the patient's medical history in detail and updated the computerized patient record.    \"Have you been to the ER, urgent care clinic since your last visit?  No Hospitalized since your last visit?\"    no    “Have you seen or consulted any other health care providers outside of [facility] since your last visit?”    no    “Have you had a colorectal cancer screening such as a colonoscopy/FIT/Cologuard?    no    No colonoscopy on file  No cologuard on file  No FIT/FOBT on file   No flexible sigmoidoscopy on file        Have you had a mammogram?”   no    Date of last Mammogram: 4/18/2018

## 2024-08-15 NOTE — PROGRESS NOTES
Assessment/Plan:        Assessment & Plan      Results    1. Type 2 diabetes mellitus with diabetic neuropathy, without long-term current use of insulin (HCC)  -     metFORMIN (GLUCOPHAGE) 500 MG tablet; Take 2 tablets by mouth 2 times daily (with meals), Disp-360 tablet, R-2Normal  2. Anxiety  -     cloNIDine (CATAPRES) 0.1 MG tablet; Take 1 tablet by mouth nightly May take extra 1 for hot flashes, Disp-100 tablet, R-1Normal  3. Neuropathy  -     cyclobenzaprine (FLEXERIL) 10 MG tablet; Take 1 tablet by mouth 3 times daily as needed for Muscle spasms, Disp-270 tablet, R-1Normal  -     gabapentin (NEURONTIN) 800 MG tablet; Take 1 tablet by mouth 3 times daily for 90 days. Max Daily Amount: 2,400 mg, Disp-90 tablet, R-1Normal  4. Gastroesophageal reflux disease, unspecified whether esophagitis present  -     omeprazole (PRILOSEC) 40 MG delayed release capsule; Take 1 capsule by mouth daily, Disp-90 capsule, R-2Normal  5. Tobacco abuse  -     buPROPion (WELLBUTRIN SR) 100 MG extended release tablet; 1 to 2 twice daily, Disp-360 tablet, R-1Normal  6. Type 2 diabetes mellitus with hyperglycemia, without long-term current use of insulin (HCC)  7. Episode of moderate major depression (HCC)    Prescribed bupropion. Patient denies history of seizures or eating disorder. Potential side effects and safe use reviewed.           Orders Placed This Encounter    cloNIDine (CATAPRES) 0.1 MG tablet     Sig: Take 1 tablet by mouth nightly May take extra 1 for hot flashes     Dispense:  100 tablet     Refill:  1    cyclobenzaprine (FLEXERIL) 10 MG tablet     Sig: Take 1 tablet by mouth 3 times daily as needed for Muscle spasms     Dispense:  270 tablet     Refill:  1    gabapentin (NEURONTIN) 800 MG tablet     Sig: Take 1 tablet by mouth 3 times daily for 90 days. Max Daily Amount: 2,400 mg     Dispense:  90 tablet     Refill:  1     Do not fill till 9/1/24    metFORMIN (GLUCOPHAGE) 500 MG tablet     Sig: Take 2 tablets by

## 2024-10-11 DIAGNOSIS — Z13.31 POSITIVE DEPRESSION SCREENING: ICD-10-CM

## 2024-10-14 RX ORDER — TRAZODONE HYDROCHLORIDE 50 MG/1
TABLET, FILM COATED ORAL
Qty: 90 TABLET | Refills: 0 | Status: SHIPPED | OUTPATIENT
Start: 2024-10-14

## 2024-11-12 DIAGNOSIS — G62.9 NEUROPATHY: ICD-10-CM

## 2024-11-12 RX ORDER — GABAPENTIN 800 MG/1
800 TABLET ORAL 3 TIMES DAILY
Qty: 30 TABLET | Refills: 0 | Status: SHIPPED | OUTPATIENT
Start: 2024-11-12 | End: 2024-11-15 | Stop reason: SDUPTHER

## 2024-11-15 ENCOUNTER — TELEMEDICINE (OUTPATIENT)
Facility: CLINIC | Age: 59
End: 2024-11-15

## 2024-11-15 DIAGNOSIS — G62.9 NEUROPATHY: ICD-10-CM

## 2024-11-15 DIAGNOSIS — J44.9 CHRONIC OBSTRUCTIVE PULMONARY DISEASE, UNSPECIFIED COPD TYPE (HCC): ICD-10-CM

## 2024-11-15 DIAGNOSIS — Z72.0 TOBACCO ABUSE: ICD-10-CM

## 2024-11-15 DIAGNOSIS — E11.40 TYPE 2 DIABETES MELLITUS WITH DIABETIC NEUROPATHY, WITHOUT LONG-TERM CURRENT USE OF INSULIN (HCC): ICD-10-CM

## 2024-11-15 DIAGNOSIS — Z13.31 POSITIVE DEPRESSION SCREENING: ICD-10-CM

## 2024-11-15 DIAGNOSIS — F41.9 ANXIETY: ICD-10-CM

## 2024-11-15 RX ORDER — TRAZODONE HYDROCHLORIDE 100 MG/1
100 TABLET ORAL NIGHTLY
Qty: 100 TABLET | Refills: 2 | Status: SHIPPED | OUTPATIENT
Start: 2024-11-15

## 2024-11-15 RX ORDER — BUPROPION HYDROCHLORIDE 100 MG/1
200 TABLET, EXTENDED RELEASE ORAL 2 TIMES DAILY
Qty: 400 TABLET | Refills: 2 | Status: SHIPPED | OUTPATIENT
Start: 2024-11-15

## 2024-11-15 RX ORDER — GABAPENTIN 800 MG/1
800 TABLET ORAL 3 TIMES DAILY
Qty: 100 TABLET | Refills: 1 | Status: SHIPPED | OUTPATIENT
Start: 2024-11-15 | End: 2025-01-19

## 2024-11-15 RX ORDER — FLUOXETINE 20 MG/1
60 TABLET ORAL DAILY
Qty: 300 TABLET | Refills: 1 | Status: SHIPPED | OUTPATIENT
Start: 2024-11-15

## 2024-11-15 RX ORDER — CLONIDINE HYDROCHLORIDE 0.1 MG/1
0.1 TABLET ORAL NIGHTLY
Qty: 100 TABLET | Refills: 2 | Status: SHIPPED | OUTPATIENT
Start: 2024-11-15

## 2024-11-15 RX ORDER — GLIPIZIDE 5 MG/1
5 TABLET ORAL 2 TIMES DAILY
Qty: 200 TABLET | Refills: 1 | Status: SHIPPED | OUTPATIENT
Start: 2024-11-15

## 2024-11-15 RX ORDER — ALBUTEROL SULFATE 90 UG/1
1 INHALANT RESPIRATORY (INHALATION) EVERY 6 HOURS PRN
Qty: 18 G | Refills: 5 | Status: SHIPPED | OUTPATIENT
Start: 2024-11-15

## 2024-11-16 NOTE — PROGRESS NOTES
Chief Complaint   Patient presents with    Medication Refill       
ICD-10-CM    1. Tobacco abuse  Z72.0 buPROPion (WELLBUTRIN SR) 100 MG extended release tablet      2. Anxiety  F41.9 cloNIDine (CATAPRES) 0.1 MG tablet     FLUoxetine HCl, PMDD, 20 MG TABS      3. Neuropathy  G62.9 gabapentin (NEURONTIN) 800 MG tablet      4. Positive depression screening  Z13.31 traZODone (DESYREL) 100 MG tablet     FLUoxetine HCl, PMDD, 20 MG TABS      5. Type 2 diabetes mellitus with diabetic neuropathy, without long-term current use of insulin (Formerly [facility])  E11.40 glipiZIDE (GLUCOTROL) 5 MG tablet      6. Chronic obstructive pulmonary disease, unspecified COPD type ([facility]ly [facility])  J44.9 albuterol sulfate HFA (PROVENTIL;VENTOLIN;PROAIR) 108 (90 Base) MCG/ACT inhaler          Current Outpatient Medications   Medication Sig Dispense Refill    buPROPion (WELLBUTRIN SR) 100 MG extended release tablet Take 2 tablets by mouth 2 times daily 400 tablet 2    cloNIDine (CATAPRES) 0.1 MG tablet Take 1 tablet by mouth nightly May take extra 1 for hot flashes 100 tablet 2    gabapentin (NEURONTIN) 800 MG tablet Take 1 tablet by mouth 3 times daily for 65 days. Max Daily Amount: 2,400 mg 100 tablet 1    traZODone (DESYREL) 100 MG tablet Take 1 tablet by mouth nightly 100 tablet 2    glipiZIDE (GLUCOTROL) 5 MG tablet Take 1 tablet by mouth 2 times daily 200 tablet 1    albuterol sulfate HFA (PROVENTIL;VENTOLIN;PROAIR) 108 (90 Base) MCG/ACT inhaler Inhale 1 puff into the lungs every 6 hours as needed for Wheezing 18 g 5    FLUoxetine HCl, PMDD, 20 MG TABS Take 60 mg by mouth daily 300 tablet 1    cyclobenzaprine (FLEXERIL) 10 MG tablet Take 1 tablet by mouth 3 times daily as needed for Muscle spasms 270 tablet 1    metFORMIN (GLUCOPHAGE) 500 MG tablet Take 2 tablets by mouth 2 times daily (with meals) 360 tablet 2    omeprazole (PRILOSEC) 40 MG delayed release capsule Take 1 capsule by mouth daily 90 capsule 2    butalbital-acetaminophen-caffeine (FIORICET, ESGIC) -40 MG per tablet Take 1 tablet by mouth every 6 hours as